# Patient Record
Sex: FEMALE | Race: WHITE | NOT HISPANIC OR LATINO | Employment: FULL TIME | URBAN - METROPOLITAN AREA
[De-identification: names, ages, dates, MRNs, and addresses within clinical notes are randomized per-mention and may not be internally consistent; named-entity substitution may affect disease eponyms.]

---

## 2017-03-23 RX ORDER — FLUOXETINE HYDROCHLORIDE 20 MG/1
CAPSULE ORAL
Qty: 90 CAPSULE | Refills: 0 | Status: SHIPPED | OUTPATIENT
Start: 2017-03-23 | End: 2017-08-28 | Stop reason: SDUPTHER

## 2017-03-23 RX ORDER — BUPROPION HYDROCHLORIDE 150 MG/1
TABLET ORAL
Qty: 90 TABLET | Refills: 0 | Status: SHIPPED | OUTPATIENT
Start: 2017-03-23 | End: 2017-08-28 | Stop reason: SDUPTHER

## 2017-07-20 ENCOUNTER — TELEPHONE (OUTPATIENT)
Dept: INTERNAL MEDICINE | Facility: CLINIC | Age: 52
End: 2017-07-20

## 2017-08-28 ENCOUNTER — LAB VISIT (OUTPATIENT)
Dept: LAB | Facility: HOSPITAL | Age: 52
End: 2017-08-28
Attending: FAMILY MEDICINE
Payer: COMMERCIAL

## 2017-08-28 ENCOUNTER — OFFICE VISIT (OUTPATIENT)
Dept: INTERNAL MEDICINE | Facility: CLINIC | Age: 52
End: 2017-08-28
Payer: COMMERCIAL

## 2017-08-28 ENCOUNTER — TELEPHONE (OUTPATIENT)
Dept: ORTHOPEDICS | Facility: CLINIC | Age: 52
End: 2017-08-28

## 2017-08-28 VITALS
BODY MASS INDEX: 33.6 KG/M2 | DIASTOLIC BLOOD PRESSURE: 82 MMHG | TEMPERATURE: 97 F | HEIGHT: 69 IN | WEIGHT: 226.88 LBS | SYSTOLIC BLOOD PRESSURE: 122 MMHG

## 2017-08-28 DIAGNOSIS — Z12.11 COLON CANCER SCREENING: ICD-10-CM

## 2017-08-28 DIAGNOSIS — Z00.00 ROUTINE GENERAL MEDICAL EXAMINATION AT A HEALTH CARE FACILITY: Primary | ICD-10-CM

## 2017-08-28 DIAGNOSIS — Z00.00 ROUTINE GENERAL MEDICAL EXAMINATION AT A HEALTH CARE FACILITY: ICD-10-CM

## 2017-08-28 LAB
25(OH)D3+25(OH)D2 SERPL-MCNC: 19 NG/ML
ALBUMIN SERPL BCP-MCNC: 3.6 G/DL
ALP SERPL-CCNC: 75 U/L
ALT SERPL W/O P-5'-P-CCNC: 20 U/L
ANION GAP SERPL CALC-SCNC: 7 MMOL/L
AST SERPL-CCNC: 21 U/L
BASOPHILS # BLD AUTO: 0.03 K/UL
BASOPHILS NFR BLD: 0.5 %
BILIRUB SERPL-MCNC: 0.4 MG/DL
BUN SERPL-MCNC: 19 MG/DL
CALCIUM SERPL-MCNC: 9.8 MG/DL
CHLORIDE SERPL-SCNC: 105 MMOL/L
CHOLEST/HDLC SERPL: 4.4 {RATIO}
CO2 SERPL-SCNC: 29 MMOL/L
CREAT SERPL-MCNC: 0.9 MG/DL
DIFFERENTIAL METHOD: NORMAL
EOSINOPHIL # BLD AUTO: 0.2 K/UL
EOSINOPHIL NFR BLD: 2.9 %
ERYTHROCYTE [DISTWIDTH] IN BLOOD BY AUTOMATED COUNT: 14.1 %
EST. GFR  (AFRICAN AMERICAN): >60 ML/MIN/1.73 M^2
EST. GFR  (NON AFRICAN AMERICAN): >60 ML/MIN/1.73 M^2
GLUCOSE SERPL-MCNC: 97 MG/DL
HCT VFR BLD AUTO: 42.3 %
HDL/CHOLESTEROL RATIO: 22.7 %
HDLC SERPL-MCNC: 198 MG/DL
HDLC SERPL-MCNC: 45 MG/DL
HGB BLD-MCNC: 13.8 G/DL
LDLC SERPL CALC-MCNC: 128.2 MG/DL
LYMPHOCYTES # BLD AUTO: 2 K/UL
LYMPHOCYTES NFR BLD: 33.4 %
MCH RBC QN AUTO: 28.5 PG
MCHC RBC AUTO-ENTMCNC: 32.6 G/DL
MCV RBC AUTO: 87 FL
MONOCYTES # BLD AUTO: 0.4 K/UL
MONOCYTES NFR BLD: 7.3 %
NEUTROPHILS # BLD AUTO: 3.3 K/UL
NEUTROPHILS NFR BLD: 55.6 %
NONHDLC SERPL-MCNC: 153 MG/DL
PLATELET # BLD AUTO: 277 K/UL
PMV BLD AUTO: 11.4 FL
POTASSIUM SERPL-SCNC: 4.7 MMOL/L
PROT SERPL-MCNC: 7.3 G/DL
RBC # BLD AUTO: 4.85 M/UL
SODIUM SERPL-SCNC: 141 MMOL/L
TRIGL SERPL-MCNC: 124 MG/DL
TSH SERPL DL<=0.005 MIU/L-ACNC: 1.8 UIU/ML
WBC # BLD AUTO: 5.9 K/UL

## 2017-08-28 PROCEDURE — 85025 COMPLETE CBC W/AUTO DIFF WBC: CPT

## 2017-08-28 PROCEDURE — 80061 LIPID PANEL: CPT

## 2017-08-28 PROCEDURE — 80053 COMPREHEN METABOLIC PANEL: CPT

## 2017-08-28 PROCEDURE — 99999 PR PBB SHADOW E&M-EST. PATIENT-LVL III: CPT | Mod: PBBFAC,,, | Performed by: FAMILY MEDICINE

## 2017-08-28 PROCEDURE — 99396 PREV VISIT EST AGE 40-64: CPT | Mod: S$GLB,,, | Performed by: FAMILY MEDICINE

## 2017-08-28 PROCEDURE — 82306 VITAMIN D 25 HYDROXY: CPT

## 2017-08-28 PROCEDURE — 84443 ASSAY THYROID STIM HORMONE: CPT

## 2017-08-28 PROCEDURE — 36415 COLL VENOUS BLD VENIPUNCTURE: CPT | Mod: PO

## 2017-08-28 PROCEDURE — 83036 HEMOGLOBIN GLYCOSYLATED A1C: CPT

## 2017-08-28 RX ORDER — FLUOXETINE HYDROCHLORIDE 20 MG/1
20 CAPSULE ORAL DAILY
Qty: 90 CAPSULE | Refills: 4 | Status: SHIPPED | OUTPATIENT
Start: 2017-08-28 | End: 2018-10-02 | Stop reason: SDUPTHER

## 2017-08-28 RX ORDER — BUPROPION HYDROCHLORIDE 150 MG/1
TABLET ORAL
Qty: 90 TABLET | Refills: 4 | Status: SHIPPED | OUTPATIENT
Start: 2017-08-28 | End: 2018-10-02 | Stop reason: SDUPTHER

## 2017-08-28 RX ORDER — OMEPRAZOLE 40 MG/1
40 CAPSULE, DELAYED RELEASE ORAL EVERY MORNING
Qty: 90 CAPSULE | Refills: 1 | Status: SHIPPED | OUTPATIENT
Start: 2017-08-28 | End: 2018-10-02 | Stop reason: SDUPTHER

## 2017-08-28 NOTE — TELEPHONE ENCOUNTER
----- Message from Angelito Jaimes MD sent at 8/27/2017 10:36 AM CDT -----  Yes, absolutely!  We will make you an appointment to be seen by me on Friday, September 8.  Someone will be in touch with you regarding an appointment time.  We will obtain x-rays of your affected hip and your lumbar spine at the time of your appointment.    Dr. Jaimes      ----- Message -----  From: Anita Street NP  Sent: 8/22/2017   3:33 PM  To: MD Garrett Ortiz Dr.,    Can you work me into your schedule on Friday, September 8th? I am scheduled to work on Friday September 1st.    Thanks,  Anita Street
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
## 2017-08-29 ENCOUNTER — PATIENT MESSAGE (OUTPATIENT)
Dept: INTERNAL MEDICINE | Facility: CLINIC | Age: 52
End: 2017-08-29

## 2017-08-29 LAB
ESTIMATED AVG GLUCOSE: 111 MG/DL
HBA1C MFR BLD HPLC: 5.5 %

## 2017-08-29 RX ORDER — ERGOCALCIFEROL 1.25 MG/1
50000 CAPSULE ORAL WEEKLY
Qty: 12 CAPSULE | Refills: 2 | Status: SHIPPED | OUTPATIENT
Start: 2017-08-29 | End: 2017-09-28

## 2017-08-31 ENCOUNTER — PATIENT MESSAGE (OUTPATIENT)
Dept: ORTHOPEDICS | Facility: CLINIC | Age: 52
End: 2017-08-31

## 2017-10-03 DIAGNOSIS — M25.552 PAIN OF BOTH HIP JOINTS: Primary | ICD-10-CM

## 2017-10-03 DIAGNOSIS — M25.551 PAIN OF BOTH HIP JOINTS: Primary | ICD-10-CM

## 2017-10-04 ENCOUNTER — HOSPITAL ENCOUNTER (OUTPATIENT)
Dept: RADIOLOGY | Facility: HOSPITAL | Age: 52
Discharge: HOME | End: 2017-10-04
Attending: ORTHOPAEDIC SURGERY
Payer: COMMERCIAL

## 2017-10-04 ENCOUNTER — OFFICE VISIT (OUTPATIENT)
Dept: ORTHOPEDICS | Facility: CLINIC | Age: 52
End: 2017-10-04
Payer: COMMERCIAL

## 2017-10-04 VITALS
RESPIRATION RATE: 18 BRPM | WEIGHT: 226 LBS | HEART RATE: 86 BPM | HEIGHT: 69 IN | BODY MASS INDEX: 33.47 KG/M2 | SYSTOLIC BLOOD PRESSURE: 134 MMHG | DIASTOLIC BLOOD PRESSURE: 84 MMHG

## 2017-10-04 DIAGNOSIS — M25.552 PAIN OF BOTH HIP JOINTS: ICD-10-CM

## 2017-10-04 DIAGNOSIS — M25.551 BILATERAL HIP PAIN: Primary | ICD-10-CM

## 2017-10-04 DIAGNOSIS — M25.551 PAIN OF BOTH HIP JOINTS: ICD-10-CM

## 2017-10-04 DIAGNOSIS — M70.62 TROCHANTERIC BURSITIS OF LEFT HIP: Primary | ICD-10-CM

## 2017-10-04 DIAGNOSIS — M25.552 BILATERAL HIP PAIN: Primary | ICD-10-CM

## 2017-10-04 DIAGNOSIS — M70.61 GREATER TROCHANTERIC BURSITIS OF RIGHT HIP: ICD-10-CM

## 2017-10-04 DIAGNOSIS — M16.0 ARTHRITIS OF BOTH HIPS: ICD-10-CM

## 2017-10-04 PROCEDURE — 99204 OFFICE O/P NEW MOD 45 MIN: CPT | Mod: 25,S$GLB,, | Performed by: ORTHOPAEDIC SURGERY

## 2017-10-04 PROCEDURE — 73521 X-RAY EXAM HIPS BI 2 VIEWS: CPT | Mod: 26,,, | Performed by: RADIOLOGY

## 2017-10-04 PROCEDURE — 99999 PR PBB SHADOW E&M-EST. PATIENT-LVL III: CPT | Mod: PBBFAC,,, | Performed by: ORTHOPAEDIC SURGERY

## 2017-10-04 PROCEDURE — 20610 DRAIN/INJ JOINT/BURSA W/O US: CPT | Mod: 50,S$GLB,, | Performed by: ORTHOPAEDIC SURGERY

## 2017-10-04 PROCEDURE — 73521 X-RAY EXAM HIPS BI 2 VIEWS: CPT | Mod: TC

## 2017-10-04 RX ORDER — METHYLPREDNISOLONE ACETATE 80 MG/ML
80 INJECTION, SUSPENSION INTRA-ARTICULAR; INTRALESIONAL; INTRAMUSCULAR; SOFT TISSUE ONCE
Status: DISCONTINUED | OUTPATIENT
Start: 2017-10-04 | End: 2017-11-13 | Stop reason: HOSPADM

## 2017-10-04 RX ORDER — METHYLPREDNISOLONE ACETATE 80 MG/ML
80 INJECTION, SUSPENSION INTRA-ARTICULAR; INTRALESIONAL; INTRAMUSCULAR; SOFT TISSUE
Status: DISCONTINUED | OUTPATIENT
Start: 2017-10-04 | End: 2017-10-04 | Stop reason: HOSPADM

## 2017-10-04 RX ORDER — SODIUM, POTASSIUM,MAG SULFATES 17.5-3.13G
SOLUTION, RECONSTITUTED, ORAL ORAL
Qty: 354 ML | Refills: 0 | Status: ON HOLD | OUTPATIENT
Start: 2017-10-04 | End: 2017-11-13 | Stop reason: ALTCHOICE

## 2017-10-04 RX ORDER — MELOXICAM 15 MG/1
15 TABLET ORAL DAILY
Qty: 30 TABLET | Refills: 2 | Status: SHIPPED | OUTPATIENT
Start: 2017-10-04 | End: 2018-10-17

## 2017-10-04 RX ADMIN — METHYLPREDNISOLONE ACETATE 80 MG: 80 INJECTION, SUSPENSION INTRA-ARTICULAR; INTRALESIONAL; INTRAMUSCULAR; SOFT TISSUE at 02:10

## 2017-10-04 NOTE — PROGRESS NOTES
Subjective:     Patient ID: Anita Street is a 51 y.o. female.    Chief Complaint: Pain of the Left Hip and Pain of the Right Hip    HPI: The patient is seen today for bilateral hip discomfort anteriorly and over the greater trochanters.  The right hip hurts worse than the left.  The patient has had a long intermittent history of hip discomfort however it has worsened over the last 6 months when she began a work out program.  Her program involves some squats etc.  Her hips also bother her after prolonged periods of standing.  There is not a positive family history for arthritis necessitating joint replacement surgery.    Family History   Problem Relation Age of Onset    Anxiety disorder Mother     Depression Mother     Diabetes Father     Hypertension Father     Heart disease Father     Prostate cancer Father     Cataracts Daughter     Hyperlipidemia Brother     Colon cancer Neg Hx     Breast cancer Neg Hx      Past Medical History:   Diagnosis Date    Anxiety     Depression     GERD (gastroesophageal reflux disease)      Social History     Social History    Marital status:      Spouse name: N/A    Number of children: 2    Years of education: N/A     Social History Main Topics    Smoking status: Never Smoker    Smokeless tobacco: Never Used    Alcohol use Yes      Comment: Rare Occ    Drug use: No    Sexual activity: Yes     Partners: Male     Other Topics Concern    None     Social History Narrative    None     Past Surgical History:   Procedure Laterality Date    CHOLECYSTECTOMY      HYSTERECTOMY      pelvic lap      Feritility     Review of patient's allergies indicates:  No Known Allergies      Medication List with Changes/Refills   New Medications    MELOXICAM (MOBIC) 15 MG TABLET    Take 1 tablet (15 mg total) by mouth once daily.   Current Medications    BUPROPION (WELLBUTRIN XL) 150 MG TB24 TABLET    TAKE 1 TABLET (150 MG TOTAL) BY MOUTH ONCE DAILY.    FLUOXETINE (PROZAC)  "20 MG CAPSULE    Take 1 capsule (20 mg total) by mouth once daily.    OMEPRAZOLE (PRILOSEC) 40 MG CAPSULE    Take 1 capsule (40 mg total) by mouth every morning.       Review of Systems   Constitution: Negative for chills, decreased appetite, weight gain and weight loss.   HENT: Negative for congestion, ear pain, hearing loss and sore throat.    Eyes: Negative for blurred vision, double vision, vision loss in left eye, vision loss in right eye and visual disturbance.   Cardiovascular: Negative for chest pain, irregular heartbeat, leg swelling and palpitations.   Respiratory: Negative for cough and shortness of breath.    Endocrine: Negative for cold intolerance and heat intolerance.   Hematologic/Lymphatic: Negative for adenopathy. Does not bruise/bleed easily.   Skin: Negative for color change, nail changes, rash and suspicious lesions.   Musculoskeletal: Positive for joint pain.   Gastrointestinal: Negative for abdominal pain, constipation, diarrhea, heartburn, nausea and vomiting.        Positive for acid reflux.   Genitourinary: Negative for bladder incontinence and dysuria.   Neurological: Negative for dizziness, paresthesias, sensory change and tremors.   Psychiatric/Behavioral: Negative for altered mental status, depression, memory loss and substance abuse.   Allergic/Immunologic: Negative for hives and persistent infections.       Objective:   /84   Pulse 86   Resp 18   Ht 5' 9" (1.753 m)   Wt 102.5 kg (226 lb)   BMI 33.37 kg/m²       General    Nursing note and vitals reviewed.  Constitutional: She is oriented to person, place, and time. She appears well-developed and well-nourished.   HENT:   Head: Normocephalic.   Nose: Nose normal.   Eyes: EOM are normal. Pupils are equal, round, and reactive to light.   Neck: Normal range of motion. Neck supple.   Cardiovascular: Normal rate and regular rhythm.    Pulmonary/Chest: Effort normal.   Abdominal: Soft. She exhibits no distension. There is no " tenderness.   Neurological: She is alert and oriented to person, place, and time.   Psychiatric: She has a normal mood and affect. Her behavior is normal.     General Musculoskeletal Exam   Gait: normal   Pelvic Obliquity: none    Right Ankle/Foot Exam   Right ankle exam is normal.    Range of Motion   The patient has normal right ankle ROM.    Alignment   Forefoot Alignment: normal    Muscle Strength   The patient has normal right ankle strength.    Tests   Anterior drawer: negative  Varus tilt: negative  Heel Walk: able to perform  Tiptoe Walk: able to perform    Other   Sensation: normal  Peroneal Subluxation: negative    Left Ankle/Foot Exam   Left ankle exam is normal.    Range of Motion   The patient has normal left ankle ROM.     Alignment   Forefoot Alignment: normal    Muscle Strength   The patient has normal left ankle strength.    Tests   Anterior drawer: negative  Varus tilt: negative  Heel Walk: able to perform  Tiptoe Walk: able to perform    Other   Sensation: normal  Peroneal Subluxation: negative    Right Knee Exam   Right knee exam is normal.    Inspection   Erythema: absent  Swelling: absent  Effusion: effusion  Deformity: deformity  Bruising: absent    Range of Motion   The patient has normal right knee ROM.    Tests   Meniscus   Joce:  Medial - negative Lateral - negative  Ligament Examination Lachman: normal (-1 to 2mm) PCL-Posterior Drawer: normal (0 to 2mm)     MCL - Valgus: normal (0 to 2mm)  LCL - Varus: normalPivot Shift: normal (Equal)  Posterolateral Corner: unstable (>15 degrees difference)  Patella   Patellar Apprehension: negative  Passive Patellar Tilt: neutral  Patellar Tracking: normal  Patellar Grind: negative    Other   Sensation: normal    Left Knee Exam   Left knee exam is normal.    Inspection   Erythema: absent  Swelling: absent  Effusion: absent  Deformity: deformity  Bruising: absent    Range of Motion   The patient has normal left knee ROM.    Tests   Meniscus    Joce:  Medial - negative Lateral - negative  Stability Lachman: normal (-1 to 2mm) PCL-Posterior Drawer: normal (0 to 2mm)  MCL - Valgus: normal (0 to 2mm)  LCL - Varus: normal (0 to 2mm)Pivot Shift: normal (Equal)  Posterolateral Corner: unstable (>15 degrees difference)  Patella   Patellar Apprehension: negative  Passive Patellar Tilt: neutral  Patellar Tracking: normal  Patellar Grind: negative    Other   Sensation: normal    Right Hip Exam     Inspection   Swelling: absent  Bruising: absent    Tenderness   The patient tender to palpation of the trochanteric bursa and rectus insertion.    Range of Motion   The patient has normal right hip ROM.    Muscle Strength   The patient has normal right hip strength.    Tests   Trendelenburg Test: negative    Other   Sensation: normal  Left Hip Exam     Inspection   Swelling: absent  Bruising: absent    Tenderness   The patient tender to palpation of the trochanteric bursa and rectus insertion.    Range of Motion   The patient has normal left hip ROM.    Muscle Strength   The patient has normal left hip strength.     Tests   Trendelenburg Test: negative    Other   Sensation: normal      Back (L-Spine & T-Spine) / Neck (C-Spine) Exam   Back exam is normal.    Neck (C-Spine) Range of Motion   Flexion:     Normal  Extension: Normal  Right Lateral Bend: normal  Left Lateral Bend: normal  Right Rotation: normal  Left Rotation: normal    Spinal Sensation   Right Side Sensation  C-Spine Level: normal   L-Spine Level: normal  S-Spine Level: normal  T-Spine Level: normal  Left Side Sensation  C-Spine Level: normal  L-Spine Level: normal  S-Spine Level: normal  T-Spine Level: normal    Other She has no scoliosis .  Head Tilt:  Negative      Right Hand/Wrist Exam   Right hand exam is normal.    Inspection   Deformity: Wrist - deformity     Range of Motion   The patient has normal right hand/wrist ROM.    Tests   Phalens Sign: negative  Tinels Sign (Medial Nerve):  negative  Finkelstein: negative  Cubital Tunnel Compression Test: negative      Other     Neuorologic Exam    Median Distribution: normal  Ulnar Distribution: normal  Radial Distribution: normal      Left Hand/Wrist Exam   Left hand exam is normal.    Inspection   Deformity: Wrist - absent     Range of Motion   The patient has normal left hand/wrist ROM.    Tests   Phalens Sign: negative  Tinels Sign (Medial Nerve): negative  Finkelstein: negative  Cubital Tunnel Compression Test: negative      Other     Sensory Exam  Median Distribution: normal  Ulnar Distribution: normal  Radial Distribution: normal      Right Elbow Exam   Right elbow exam is normal.    Inspection   Effusion: absent  Bruising: absent  Deformity: absent    Range of Motion   The patient has normal right elbow ROM.    Tests Tinel's Sign (cubital tunnel): negative    Other   Sensation: normal      Left Elbow Exam   Left elbow exam is normal.    Inspection   Effusion: absent  Bruising: absent  Deformity: absent    Range of Motion   The patient has normal left elbow ROM.    Tests Tinel's Sign (cubital tunnel): negative    Other   Sensation: normal    Right Shoulder Exam   Right shoulder exam is normal.    Range of Motion   Active Abduction: normal   Passive Abduction: normal   Extension: normal   Forward Flexion: normal   Forward Elevation: normal  Adduction: normal  External Rotation 0 degrees: normal   Internal Rotation 0 degrees: normal     Muscle Strength   The patient has normal right shoulder strength.    Tests & Signs   Apprehension: negative  Cross Arm: negative  Impingement: negative    Other   Sensation: normal    Left Shoulder Exam   Left shoulder exam is normal.    Range of Motion   Active Abduction: normal   Passive Abduction: normal   Extension: normal   Forward Flexion: normal   Forward Elevation: normal  Adduction: normal  External Rotation 0 degrees: normal   Internal Rotation 0 degrees: normal     Muscle Strength   The patient has  normal left shoulder strength.    Tests & Signs   Apprehension: negative  Cross Arm: negative  Impingement: negative    Other   Sensation: normal       Muscle Strength   Right Upper Extremity   Wrist Extension: /   Wrist Flexion: /   :    Elbow Pronation:     Elbow Supination:     Elbow Extension:   Elbow Flexion:   Intrinsics:   Left Upper Extremity  Wrist Extension:    Wrist Flexion:    :     Elbow Pronation:     Elbow Supination:     Elbow Extension:   Elbow Flexion:   Intrinsics: 5  Right Lower Extremity   Hip Abduction:    Quadriceps:     Hamstrin/5   Left Lower Extremity   Hip Abduction:    Quadriceps:     Hamstrin/5     Reflexes     Left Side  Biceps:  2+  Triceps:  2+  Quadriceps:  2+  Achilles:  2+    Right Side   Biceps:  2+  Triceps:  2+  Quadriceps:  2+  Achilles:  2+    Vascular Exam     Right Pulses  Dorsalis Pedis:      2+  Posterior Tibial:      2+  Radial:                    2+      Left Pulses  Dorsalis Pedis:      2+  Posterior Tibial:      2+  Radial:                    2+      Capillary Refill  Right Hand: normal capillary refill  Left Hand: normal capillary refill        X-RAY:   X-rays of the hips show some early subchondral sclerosis of the acetabulum bilaterally.  Joint space is relatively well maintained.  There is no acute fracture or bony destructive lesion noted.    Assessment:         Encounter Diagnoses   Name Primary?    Trochanteric bursitis of left hip Yes    Greater trochanteric bursitis of right hip     Arthritis of both hips       Possible femoral acetabular impingement syndrome bilaterally.  The patient is more symptomatically the right than the left.    Plan:     The patient is a candidate for steroid and Xylocaine injections to the greater trochanter bursa area bilaterally.  She was started on meloxicam 15 mg daily with food.  The patient will be seen back in 6-8 weeksas necessary if she  continues to be symptomatic.  She understands that sometimes one injection and medication does the trick, however sometimes, a series of 3 or maximum of 4 injections is required.  Should the patient remain symptomatic at an unacceptable level of discomfort and function, she will require further evaluation with an MRI scan.  Should she go on to have significant symptoms consistent with femoral acetabular impingement, she may require referral for hip arthroscopic evaluation, as I do not do hip arthroscopy.             Disclaimer: This note is generated with speech recognition software and is subject to transcription error and sound alike phrases that may be missed by proofreading.

## 2017-10-04 NOTE — PROCEDURES
Large Joint Aspiration/Injection  Date/Time: 10/4/2017 2:02 PM  Performed by: EITAN THAPA  Authorized by: EITAN THAPA     Consent Done?:  Yes (Verbal)  Indications:  Pain  Procedure site marked: Yes    Timeout: Prior to procedure the correct patient, procedure, and site was verified      Location:  Hip  Site:  R greater trochanteric bursa and L greater trochanteric bursa  Prep: Patient was prepped and draped in usual sterile fashion    Ultrasonic Guidance for needle placement: No  Needle size:  22 G  Approach:  Lateral  Medications:  80 mg methylPREDNISolone acetate 80 mg/mL; 80 mg methylPREDNISolone acetate 80 mg/mL  Patient tolerance:  Patient tolerated the procedure well with no immediate complications

## 2017-11-13 ENCOUNTER — SURGERY (OUTPATIENT)
Age: 52
End: 2017-11-13

## 2017-11-13 ENCOUNTER — HOSPITAL ENCOUNTER (OUTPATIENT)
Facility: HOSPITAL | Age: 52
Discharge: HOME | End: 2017-11-13
Attending: INTERNAL MEDICINE | Admitting: INTERNAL MEDICINE
Payer: COMMERCIAL

## 2017-11-13 ENCOUNTER — ANESTHESIA (OUTPATIENT)
Dept: ENDOSCOPY | Facility: HOSPITAL | Age: 52
End: 2017-11-13
Payer: COMMERCIAL

## 2017-11-13 ENCOUNTER — ANESTHESIA EVENT (OUTPATIENT)
Dept: ENDOSCOPY | Facility: HOSPITAL | Age: 52
End: 2017-11-13
Payer: COMMERCIAL

## 2017-11-13 VITALS
RESPIRATION RATE: 18 BRPM | WEIGHT: 226 LBS | BODY MASS INDEX: 33.47 KG/M2 | HEIGHT: 69 IN | HEART RATE: 69 BPM | DIASTOLIC BLOOD PRESSURE: 72 MMHG | SYSTOLIC BLOOD PRESSURE: 125 MMHG | TEMPERATURE: 98 F | OXYGEN SATURATION: 100 %

## 2017-11-13 VITALS — RESPIRATION RATE: 21 BRPM

## 2017-11-13 DIAGNOSIS — Z12.11 COLON CANCER SCREENING: Primary | ICD-10-CM

## 2017-11-13 PROCEDURE — 45380 COLONOSCOPY AND BIOPSY: CPT | Performed by: INTERNAL MEDICINE

## 2017-11-13 PROCEDURE — 37000009 HC ANESTHESIA EA ADD 15 MINS: Performed by: INTERNAL MEDICINE

## 2017-11-13 PROCEDURE — 88305 TISSUE EXAM BY PATHOLOGIST: CPT | Performed by: PATHOLOGY

## 2017-11-13 PROCEDURE — 25000003 PHARM REV CODE 250: Performed by: INTERNAL MEDICINE

## 2017-11-13 PROCEDURE — 37000008 HC ANESTHESIA 1ST 15 MINUTES: Performed by: INTERNAL MEDICINE

## 2017-11-13 PROCEDURE — 88305 TISSUE EXAM BY PATHOLOGIST: CPT | Mod: 26,,, | Performed by: PATHOLOGY

## 2017-11-13 PROCEDURE — 45380 COLONOSCOPY AND BIOPSY: CPT | Mod: 33,,, | Performed by: INTERNAL MEDICINE

## 2017-11-13 PROCEDURE — 27201012 HC FORCEPS, HOT/COLD, DISP: Performed by: INTERNAL MEDICINE

## 2017-11-13 PROCEDURE — 25000003 PHARM REV CODE 250: Performed by: NURSE ANESTHETIST, CERTIFIED REGISTERED

## 2017-11-13 PROCEDURE — 63600175 PHARM REV CODE 636 W HCPCS: Performed by: NURSE ANESTHETIST, CERTIFIED REGISTERED

## 2017-11-13 RX ORDER — LIDOCAINE HYDROCHLORIDE 10 MG/ML
INJECTION INFILTRATION; PERINEURAL
Status: DISCONTINUED | OUTPATIENT
Start: 2017-11-13 | End: 2017-11-13

## 2017-11-13 RX ORDER — PROPOFOL 10 MG/ML
VIAL (ML) INTRAVENOUS
Status: DISCONTINUED | OUTPATIENT
Start: 2017-11-13 | End: 2017-11-13

## 2017-11-13 RX ORDER — SODIUM CHLORIDE, SODIUM LACTATE, POTASSIUM CHLORIDE, CALCIUM CHLORIDE 600; 310; 30; 20 MG/100ML; MG/100ML; MG/100ML; MG/100ML
INJECTION, SOLUTION INTRAVENOUS CONTINUOUS
Status: DISCONTINUED | OUTPATIENT
Start: 2017-11-14 | End: 2017-11-13 | Stop reason: HOSPADM

## 2017-11-13 RX ADMIN — LIDOCAINE HYDROCHLORIDE 50 MG: 10 INJECTION, SOLUTION INFILTRATION; PERINEURAL at 07:11

## 2017-11-13 RX ADMIN — PROPOFOL 50 MG: 10 INJECTION, EMULSION INTRAVENOUS at 08:11

## 2017-11-13 RX ADMIN — PROPOFOL 50 MG: 10 INJECTION, EMULSION INTRAVENOUS at 07:11

## 2017-11-13 RX ADMIN — SODIUM CHLORIDE, SODIUM LACTATE, POTASSIUM CHLORIDE, AND CALCIUM CHLORIDE: 600; 310; 30; 20 INJECTION, SOLUTION INTRAVENOUS at 07:11

## 2017-11-13 NOTE — TRANSFER OF CARE
"Anesthesia Transfer of Care Note    Patient: Anita Street    Procedure(s) Performed: Procedure(s) (LRB):  COLONOSCOPY (N/A)    Patient location: GI    Anesthesia Type: MAC    Transport from OR: Transported from OR on room air with adequate spontaneous ventilation    Post pain: adequate analgesia    Post assessment: no apparent anesthetic complications    Post vital signs: stable    Level of consciousness: awake, alert and oriented    Nausea/Vomiting: no nausea/vomiting    Complications: none    Transfer of care protocol was followed      Last vitals:   Visit Vitals  BP (!) 141/74 (BP Location: Left arm, Patient Position: Lying)   Pulse 82   Temp 36.7 °C (98.1 °F) (Oral)   Resp 18   Ht 5' 9" (1.753 m)   Wt 102.5 kg (226 lb)   SpO2 99%   Breastfeeding? No   BMI 33.37 kg/m²     "

## 2017-11-13 NOTE — H&P
Short Stay Endoscopy History and Physical    PCP - Dayton Malave MD    Procedure - Colonoscopy    No history of colon polyps, no FH of colon cancer. Needs colon cancer screening. Denies any acute issues.    ROS:  Constitutional: No fevers, chills, No weight loss  ENT: No allergies  CV: No chest pain  Pulm: No cough, No shortness of breath  Ophtho: No vision changes  GI: see HPI  Derm: No rash  Heme: No lymphadenopathy, No bruising  MSK: No arthritis  : No dysuria, No hematuria  Endo: No hot or cold intolerance  Neuro: No syncope, No seizure  Psych: No anxiety, No depression    Medical History:  has a past medical history of Anxiety; Depression; and GERD (gastroesophageal reflux disease).    Surgical History:  has a past surgical history that includes Hysterectomy; Cholecystectomy; and pelvic lap.    Family History: family history includes Anxiety disorder in her mother; Cataracts in her daughter; Depression in her mother; Diabetes in her father; Heart disease in her father; Hyperlipidemia in her brother; Hypertension in her father; Prostate cancer in her father.. Otherwise no colon cancer, inflammatory bowel disease, or GI malignancies.    Social History:  reports that she has never smoked. She has never used smokeless tobacco. She reports that she drinks alcohol. She reports that she does not use drugs.    Review of patient's allergies indicates:  No Known Allergies    Medications:   Facility-Administered Medications Prior to Admission   Medication Dose Route Frequency Provider Last Rate Last Dose    methylPREDNISolone acetate injection 80 mg  80 mg Intramuscular Once Angelito Jaimes MD        methylPREDNISolone acetate injection 80 mg  80 mg Intramuscular Once Angelito Jaimes MD         Prescriptions Prior to Admission   Medication Sig Dispense Refill Last Dose    buPROPion (WELLBUTRIN XL) 150 MG TB24 tablet TAKE 1 TABLET (150 MG TOTAL) BY MOUTH ONCE DAILY. 90 tablet 4 11/12/2017 at Unknown time     fluoxetine (PROZAC) 20 MG capsule Take 1 capsule (20 mg total) by mouth once daily. 90 capsule 4 11/12/2017 at Unknown time    meloxicam (MOBIC) 15 MG tablet Take 1 tablet (15 mg total) by mouth once daily. 30 tablet 2 11/12/2017 at Unknown time    omeprazole (PRILOSEC) 40 MG capsule Take 1 capsule (40 mg total) by mouth every morning. 90 capsule 1 Past Month at Unknown time       Objective Findings:    Vital Signs:   Vitals:    11/13/17 0725   BP: (!) 141/74   Pulse: 82   Resp: 18   Temp: 98.1 °F (36.7 °C)         Physical Exam:  General Appearance: Well appearing in no acute distress  Eyes:    No scleral icterus  ENT: Neck supple, Lips, mucosa, and tongue normal; teeth and gums normal  Lungs: CTA bilaterally in anterior and posterior fields, no wheezes, no crackles.  Heart:  Regular rate, S1, S2 normal, no murmurs heard.  Abdomen: Soft, non tender, non distended with normal bowel sounds. No hepatosplenomegaly, ascites, or mass.  Extremities: No clubbing, cyanosis or edema  Skin: No rash    Labs:  Lab Results   Component Value Date    WBC 5.90 08/28/2017    HGB 13.8 08/28/2017    HCT 42.3 08/28/2017     08/28/2017    CHOL 198 08/28/2017    TRIG 124 08/28/2017    HDL 45 08/28/2017    ALT 20 08/28/2017    AST 21 08/28/2017     08/28/2017    K 4.7 08/28/2017     08/28/2017    CREATININE 0.9 08/28/2017    BUN 19 08/28/2017    CO2 29 08/28/2017    TSH 1.798 08/28/2017    HGBA1C 5.5 08/28/2017       I have explained the risks and benefits of endoscopy procedures to the patient including but not limited to bleeding, perforation, infection, and death.    Proceed with colonoscopy for average risk colon cancer screening.

## 2017-11-13 NOTE — ANESTHESIA RELEASE NOTE
"Anesthesia Release from PACU Note    Patient: Anita Street    Procedure(s) Performed: Procedure(s) (LRB):  COLONOSCOPY (N/A)    Anesthesia type: MAC    Post pain: Adequate analgesia    Post assessment: no apparent anesthetic complications, tolerated procedure well and no evidence of recall    Last Vitals:   Visit Vitals  /72 (BP Location: Left arm, Patient Position: Lying)   Pulse 69   Temp 36.7 °C (98.1 °F) (Oral)   Resp 18   Ht 5' 9" (1.753 m)   Wt 102.5 kg (226 lb)   SpO2 100%   Breastfeeding? No   BMI 33.37 kg/m²       Post vital signs: stable    Level of consciousness: awake, alert  and oriented    Nausea/Vomiting: no nausea/no vomiting    Complications: none    Airway Patency: patent    Respiratory: unassisted, spontaneous ventilation, room air    Cardiovascular: stable and blood pressure at baseline    Hydration: euvolemic  "

## 2017-11-13 NOTE — PLAN OF CARE
AT BEDSIDE. DR BOCANEGRA SPOKE TO PT AND  . DISCHARGE INSTRUCTIONS GIVEN TO PT AND FAMILY. VERBALIZE UNDERSTANDING

## 2017-11-13 NOTE — ANESTHESIA POSTPROCEDURE EVALUATION
"Anesthesia Post Evaluation    Patient: Anita Street    Procedure(s) Performed: Procedure(s) (LRB):  COLONOSCOPY (N/A)    Final Anesthesia Type: MAC  Patient location during evaluation: GI PACU  Patient participation: Yes- Able to Participate  Level of consciousness: awake and alert and oriented  Post-procedure vital signs: reviewed and stable  Pain management: adequate  Airway patency: patent  PONV status at discharge: No PONV  Anesthetic complications: no      Cardiovascular status: blood pressure returned to baseline  Respiratory status: unassisted, room air and spontaneous ventilation  Hydration status: euvolemic  Follow-up not needed.        Visit Vitals  /72 (BP Location: Left arm, Patient Position: Lying)   Pulse 69   Temp 36.7 °C (98.1 °F) (Oral)   Resp 18   Ht 5' 9" (1.753 m)   Wt 102.5 kg (226 lb)   SpO2 100%   Breastfeeding? No   BMI 33.37 kg/m²       Pain/Aditya Score: Pain Assessment Performed: Yes (11/13/2017  7:28 AM)  Presence of Pain: denies (11/13/2017  8:50 AM)  Aditya Score: 10 (11/13/2017  8:50 AM)      "

## 2017-11-13 NOTE — ANESTHESIA PREPROCEDURE EVALUATION
11/13/2017  Anita Street is a 51 y.o., female.    Anesthesia Evaluation    I have reviewed the Patient Summary Reports.    I have reviewed the Nursing Notes.   I have reviewed the Medications.     Review of Systems  Anesthesia Hx:  No problems with previous Anesthesia    Social:  No Alcohol Use    Hepatic/GI:   Bowel Prep. GERD  Esophageal / Stomach Disorders Gerd    Endocrine:  Metabolic Disorders, Obesity / BMI > 30  Psych:   Psychiatric History anxiety depression          Physical Exam  General:  Obesity    Airway/Jaw/Neck:  Airway Findings: Mouth Opening: Normal Tongue: Normal  General Airway Assessment: Adult       Chest/Lungs:  Chest/Lungs Findings: Normal Respiratory Rate     Heart/Vascular:  Heart Findings: Rate: Normal             Anesthesia Plan  Type of Anesthesia, risks & benefits discussed:  Anesthesia Type:  MAC  Patient's Preference:   Intra-op Monitoring Plan: standard ASA monitors  Intra-op Monitoring Plan Comments:   Post Op Pain Control Plan:   Post Op Pain Control Plan Comments:   Induction:   IV  Beta Blocker:  Patient is not currently on a Beta-Blocker (No further documentation required).       Informed Consent: Patient understands risks and agrees with Anesthesia plan.  Questions answered. Anesthesia consent signed with patient.  ASA Score: 2     Day of Surgery Review of History & Physical: I have interviewed and examined the patient. I have reviewed the patient's H&P dated:  There are no significant changes.          Ready For Surgery From Anesthesia Perspective.

## 2017-11-13 NOTE — DISCHARGE INSTRUCTIONS

## 2018-01-19 ENCOUNTER — OFFICE VISIT (OUTPATIENT)
Dept: OTOLARYNGOLOGY | Facility: CLINIC | Age: 53
End: 2018-01-19
Payer: COMMERCIAL

## 2018-01-19 VITALS
TEMPERATURE: 98 F | BODY MASS INDEX: 34.48 KG/M2 | DIASTOLIC BLOOD PRESSURE: 85 MMHG | WEIGHT: 232.81 LBS | RESPIRATION RATE: 18 BRPM | HEART RATE: 82 BPM | HEIGHT: 69 IN | SYSTOLIC BLOOD PRESSURE: 133 MMHG

## 2018-01-19 DIAGNOSIS — D10.1 FIBROMA OF TONGUE: Primary | ICD-10-CM

## 2018-01-19 PROCEDURE — 99999 PR PBB SHADOW E&M-EST. PATIENT-LVL III: CPT | Mod: PBBFAC,,, | Performed by: OTOLARYNGOLOGY

## 2018-01-19 PROCEDURE — 3008F BODY MASS INDEX DOCD: CPT | Mod: S$GLB,,, | Performed by: OTOLARYNGOLOGY

## 2018-01-19 PROCEDURE — 99203 OFFICE O/P NEW LOW 30 MIN: CPT | Mod: S$GLB,,, | Performed by: OTOLARYNGOLOGY

## 2018-02-21 NOTE — PROGRESS NOTES
"Referring Provider:    Eliasrefliseal Self  No address on file  Subjective:   Patient: Anita Street [id number], :1965   Visit date:2018 9:58 PM    Chief Complaint:  Other (tongue lesion- patient states she has had this lesion for a year)    HPI:  Anita is a 52 y.o. female who I was asked to see in consultation for evaluation of the following issue(s):     Small mass on tongue present for about 1 year. No pain.  No recollection of trauma.  No change in size.  No history of tobacco exposure.     Review of Systems:  -     Allergic/Immunologic: has No Known Allergies..  -     Constitutional: Current temp: 98 °F (36.7 °C) (Tympanic)      Her meds, allergies, medical, surgical, social & family histories were reviewed & updated:  -     She has a current medication list which includes the following prescription(s): bupropion, fluoxetine, meloxicam, and omeprazole.  -     She  has a past medical history of Anxiety; Depression; and GERD (gastroesophageal reflux disease).   -     She  does not have any pertinent problems on file.   -     She  has a past surgical history that includes Hysterectomy; Cholecystectomy; pelvic lap; and Colonoscopy (N/A, 2017).  -     She  reports that she has never smoked. She has never used smokeless tobacco. She reports that she drinks alcohol. She reports that she does not use drugs.  -     Her family history includes Anxiety disorder in her mother; Cataracts in her daughter; Depression in her mother; Diabetes in her father; Heart disease in her father; Hyperlipidemia in her brother; Hypertension in her father; Prostate cancer in her father.  -     She has No Known Allergies.    Objective:     Physical Exam:  Vitals:  /85   Pulse 82   Temp 98 °F (36.7 °C) (Tympanic)   Resp 18   Ht 5' 9" (1.753 m)   Wt 105.6 kg (232 lb 12.9 oz)   BMI 34.38 kg/m²   General appearance:  Well developed, well nourished    Eyes:  Extraocular motions intact, PERRL    Communication:  no " hoarseness, no dysphonia    Ears:  Otoscopy of external auditory canals and tympanic membranes was normal, clinical speech reception thresholds grossly intact, no mass/lesion of auricle.  Nose:  No masses/lesions of external nose, nasal mucosa, septum, and turbinates were within normal limits.  Mouth:  No mass/lesion of lips, teeth, gums, hard/soft palate, tonsils, or oropharynx. Small round mass on anterolateral tongue with normal overlying mucosa and pedunculated base    Cardiovascular:  No pedal edema; Radial Pulses +2     Neck & Lymphatics:  No cervical lymphadenopathy, no neck mass/crepitus/ asymmetry, trachea is midline, no thyroid enlargement/tenderness/mass.    Psych: Oriented x3,  Alert with normal mood and affect.     Respiration/Chest:  Symmetric expansion during respiration, normal respiratory effort.    Skin:  Warm and intact. No ulcerations of face, scalp, neck.      Assessment & Plan:   Fibroma of tongue    Overall appears to be an asymptomatic fibroma of the tongue.  We discussed her medical conditions, treatments and plan.   Furthermore, I counseled Anita that while at this point I do not see any evidence of head and neck cancer, this does not eliminate the possibility of a very small malignancy that is currently undetectable on examination or developing head and neck cancer in the future.  I advised them of the signs of head and neck cancer and that they should alert my office should they occur for more than 10-14 days (unintentional weight loss, pain or difficulty with swallowing, bleeding in the oral cavity or throat, coughing or spitting up blood, pain with mouth opening, persistent ulceration of the skin or mucosa, persistent or enlarging neck masses/swelling).  Anita seemed to have a clear understanding of the concerning nature of these conditions.  Anita should return to clinic if any issues arise (symptoms worsen or persist), otherwise we will see her back in the clinic only as  needed.         We discussed her medical conditions, treatments and plan.  Anita should return to clinic if any issues arise (symptoms worsen or persist), otherwise we will see her back in the clinic only as needed.      Thank you for allowing me to participate in the care of Anita.    Deni Edouard MD

## 2018-10-02 RX ORDER — BUPROPION HYDROCHLORIDE 150 MG/1
TABLET ORAL
Qty: 90 TABLET | Refills: 4 | Status: SHIPPED | OUTPATIENT
Start: 2018-10-02 | End: 2020-01-06

## 2018-10-02 RX ORDER — FLUOXETINE HYDROCHLORIDE 20 MG/1
20 CAPSULE ORAL DAILY
Qty: 90 CAPSULE | Refills: 4 | Status: SHIPPED | OUTPATIENT
Start: 2018-10-02 | End: 2020-01-06

## 2018-10-02 RX ORDER — OMEPRAZOLE 40 MG/1
40 CAPSULE, DELAYED RELEASE ORAL EVERY MORNING
Qty: 90 CAPSULE | Refills: 1 | Status: SHIPPED | OUTPATIENT
Start: 2018-10-02 | End: 2020-09-09

## 2018-10-17 ENCOUNTER — OFFICE VISIT (OUTPATIENT)
Dept: OTOLARYNGOLOGY | Facility: CLINIC | Age: 53
End: 2018-10-17
Payer: COMMERCIAL

## 2018-10-17 VITALS
BODY MASS INDEX: 31.25 KG/M2 | HEART RATE: 73 BPM | DIASTOLIC BLOOD PRESSURE: 89 MMHG | WEIGHT: 211.63 LBS | SYSTOLIC BLOOD PRESSURE: 140 MMHG

## 2018-10-17 DIAGNOSIS — K14.8 TONGUE MASS: Primary | ICD-10-CM

## 2018-10-17 PROCEDURE — 99999 PR PBB SHADOW E&M-EST. PATIENT-LVL III: CPT | Mod: PBBFAC,,, | Performed by: OTOLARYNGOLOGY

## 2018-10-17 PROCEDURE — 88305 TISSUE EXAM BY PATHOLOGIST: CPT | Mod: 26,,, | Performed by: PATHOLOGY

## 2018-10-17 PROCEDURE — 88305 TISSUE EXAM BY PATHOLOGIST: CPT | Performed by: PATHOLOGY

## 2018-10-17 PROCEDURE — 3008F BODY MASS INDEX DOCD: CPT | Mod: CPTII,S$GLB,, | Performed by: OTOLARYNGOLOGY

## 2018-10-17 PROCEDURE — 99213 OFFICE O/P EST LOW 20 MIN: CPT | Mod: 25,S$GLB,, | Performed by: OTOLARYNGOLOGY

## 2018-10-17 PROCEDURE — 40812 EXCISE/REPAIR MOUTH LESION: CPT | Mod: S$GLB,,, | Performed by: OTOLARYNGOLOGY

## 2018-10-17 RX ORDER — AMOXICILLIN AND CLAVULANATE POTASSIUM 875; 125 MG/1; MG/1
1 TABLET, FILM COATED ORAL 2 TIMES DAILY
Qty: 28 TABLET | Refills: 0 | Status: SHIPPED | OUTPATIENT
Start: 2018-10-17 | End: 2018-10-24

## 2018-10-18 NOTE — PROGRESS NOTES
Subjective:   Patient: Anita Street [id number], :1965   Visit date:10/17/2018 4:08 PM    Chief Complaint:  Other (removal of tongue lesion)    HPI:  Anita is a 52 y.o. female who is here for follow-up. She was last here several months ago with a mildly tender raised area on the anterior tongue.  This has become more bothersome to her.  She frequently bites on this accidentally.  It has become somewhat more uncomfortable.         Review of Systems:  -     Allergic/Immunologic: has No Known Allergies..  -     Constitutional: Current temp:      Her meds, allergies, medical, surgical, social & family histories were reviewed & updated:  -     She has a current medication list which includes the following prescription(s): bupropion, fluoxetine, omeprazole, and amoxicillin-clavulanate 875-125mg.  -     She  has a past medical history of Anxiety, Depression, and GERD (gastroesophageal reflux disease).   -     She does not have any pertinent problems on file.   -     She  has a past surgical history that includes Hysterectomy; Cholecystectomy; pelvic lap; and COLONOSCOPY (N/A, 2017).  -     She  reports that  has never smoked. she has never used smokeless tobacco. She reports that she drinks alcohol. She reports that she does not use drugs.  -     Her family history includes Anxiety disorder in her mother; Cataracts in her daughter; Depression in her mother; Diabetes in her father; Heart disease in her father; Hyperlipidemia in her brother; Hypertension in her father; Prostate cancer in her father.  -     She has No Known Allergies.    Objective:     Physical Exam:  Vitals:  BP (!) 140/89   Pulse 73   Wt 96 kg (211 lb 10.3 oz)   BMI 31.25 kg/m²   Communication:  Able to communicate, no hoarseness.  Head & Face:  Normocephalic, atraumatic, no sinus tenderness.  Eyes:  Extraocular motions intact.  Ears:  Otoscopy of external auditory canals and tympanic membranes was normal, clinical speech reception  thresholds grossly intact, no mass/lesion of auricle.  Nose:  No masses/lesions of external nose, nasal mucosa, septum, and turbinates were within normal limits.  Mouth:  No mass/lesion of lips, teeth, gums, hard/soft palate, tongue, tonsils, or oropharynx.  Neck & Lymphatics:  No cervical lymphadenopathy, no neck mass/crepitus/ asymmetry, trachea is midline, no thyroid enlargement/tenderness/mass.  Neuro/Psych: Alert with normal mood and affect.   Respiration/Chest:  Symmetric expansion during respiration, normal respiratory effort.  Skin:  Warm and intact.    Assessment & Plan:   Anita was seen today for other.    Diagnoses and all orders for this visit:    Tongue mass  -     Tissue Specimen To Pathology, Surgery    Other orders  -     amoxicillin-clavulanate 875-125mg (AUGMENTIN) 875-125 mg per tablet; Take 1 tablet by mouth 2 (two) times daily. for 7 days      Excision performed.    Will call with path.      Patient: Anita Street [id number], :1965  Procedure date:10/17/2018  Patient's medications, allergies, past medical, surgical, social and family histories were reviewed and updated as appropriate.  Chief Complaint:  Other (removal of tongue lesion)    HPI:  Anita is a 52 y.o. female with a mass located on the anterior tongue left of midline    Procedure: Risks, benefits, and alternatives of the procedure were discussed with the patient, and the patient consented to the excision of the left tongue mass and reconstruction of the defect.  The area was visualized with proper lighting and exposure.  Adequate anesthesia was obtained using 2cc of 1% Lidocaine with 1:100,000 Epinephrine.  An incision was made and the mass was dissected circumferentially until only the medial attachment remained.  The mass entirety of the mass was removed by transecting the base.       The wound did require suture closure- 4-0 Chromic.      The patient tolerated the procedure well with no complications.        Assessment & Plan:  - see today's clinic note

## 2018-10-26 ENCOUNTER — PATIENT MESSAGE (OUTPATIENT)
Dept: OTOLARYNGOLOGY | Facility: CLINIC | Age: 53
End: 2018-10-26

## 2018-10-31 ENCOUNTER — LAB VISIT (OUTPATIENT)
Dept: LAB | Facility: HOSPITAL | Age: 53
End: 2018-10-31
Attending: FAMILY MEDICINE
Payer: COMMERCIAL

## 2018-10-31 ENCOUNTER — OFFICE VISIT (OUTPATIENT)
Dept: INTERNAL MEDICINE | Facility: CLINIC | Age: 53
End: 2018-10-31
Payer: COMMERCIAL

## 2018-10-31 VITALS
TEMPERATURE: 97 F | HEIGHT: 69 IN | SYSTOLIC BLOOD PRESSURE: 118 MMHG | HEART RATE: 66 BPM | WEIGHT: 214.5 LBS | DIASTOLIC BLOOD PRESSURE: 82 MMHG | BODY MASS INDEX: 31.77 KG/M2

## 2018-10-31 DIAGNOSIS — Z00.00 ROUTINE GENERAL MEDICAL EXAMINATION AT A HEALTH CARE FACILITY: ICD-10-CM

## 2018-10-31 DIAGNOSIS — Z00.00 ROUTINE GENERAL MEDICAL EXAMINATION AT A HEALTH CARE FACILITY: Primary | ICD-10-CM

## 2018-10-31 LAB
25(OH)D3+25(OH)D2 SERPL-MCNC: 25 NG/ML
ALBUMIN SERPL BCP-MCNC: 3.6 G/DL
ALP SERPL-CCNC: 65 U/L
ALT SERPL W/O P-5'-P-CCNC: 13 U/L
ANION GAP SERPL CALC-SCNC: 6 MMOL/L
AST SERPL-CCNC: 15 U/L
BASOPHILS # BLD AUTO: 0.06 K/UL
BASOPHILS NFR BLD: 0.9 %
BILIRUB SERPL-MCNC: 0.5 MG/DL
BUN SERPL-MCNC: 18 MG/DL
CALCIUM SERPL-MCNC: 9.7 MG/DL
CHLORIDE SERPL-SCNC: 105 MMOL/L
CHOLEST SERPL-MCNC: 168 MG/DL
CHOLEST/HDLC SERPL: 3.7 {RATIO}
CO2 SERPL-SCNC: 27 MMOL/L
CREAT SERPL-MCNC: 0.8 MG/DL
DIFFERENTIAL METHOD: ABNORMAL
EOSINOPHIL # BLD AUTO: 0.2 K/UL
EOSINOPHIL NFR BLD: 2.8 %
ERYTHROCYTE [DISTWIDTH] IN BLOOD BY AUTOMATED COUNT: 13.5 %
EST. GFR  (AFRICAN AMERICAN): >60 ML/MIN/1.73 M^2
EST. GFR  (NON AFRICAN AMERICAN): >60 ML/MIN/1.73 M^2
ESTIMATED AVG GLUCOSE: 111 MG/DL
GLUCOSE SERPL-MCNC: 84 MG/DL
HBA1C MFR BLD HPLC: 5.5 %
HCT VFR BLD AUTO: 42.4 %
HDLC SERPL-MCNC: 46 MG/DL
HDLC SERPL: 27.4 %
HGB BLD-MCNC: 13.3 G/DL
IMM GRANULOCYTES # BLD AUTO: 0.02 K/UL
IMM GRANULOCYTES NFR BLD AUTO: 0.3 %
LDLC SERPL CALC-MCNC: 100.6 MG/DL
LYMPHOCYTES # BLD AUTO: 2.1 K/UL
LYMPHOCYTES NFR BLD: 30.7 %
MCH RBC QN AUTO: 28.9 PG
MCHC RBC AUTO-ENTMCNC: 31.4 G/DL
MCV RBC AUTO: 92 FL
MONOCYTES # BLD AUTO: 0.5 K/UL
MONOCYTES NFR BLD: 7.8 %
NEUTROPHILS # BLD AUTO: 3.8 K/UL
NEUTROPHILS NFR BLD: 57.5 %
NONHDLC SERPL-MCNC: 122 MG/DL
NRBC BLD-RTO: 0 /100 WBC
PLATELET # BLD AUTO: 252 K/UL
PMV BLD AUTO: 11.9 FL
POTASSIUM SERPL-SCNC: 4.5 MMOL/L
PROT SERPL-MCNC: 6.7 G/DL
RBC # BLD AUTO: 4.61 M/UL
SODIUM SERPL-SCNC: 138 MMOL/L
TRIGL SERPL-MCNC: 107 MG/DL
TSH SERPL DL<=0.005 MIU/L-ACNC: 1.18 UIU/ML
WBC # BLD AUTO: 6.68 K/UL

## 2018-10-31 PROCEDURE — 83036 HEMOGLOBIN GLYCOSYLATED A1C: CPT

## 2018-10-31 PROCEDURE — 82306 VITAMIN D 25 HYDROXY: CPT

## 2018-10-31 PROCEDURE — 85025 COMPLETE CBC W/AUTO DIFF WBC: CPT

## 2018-10-31 PROCEDURE — 80061 LIPID PANEL: CPT

## 2018-10-31 PROCEDURE — 99396 PREV VISIT EST AGE 40-64: CPT | Mod: S$GLB,,, | Performed by: FAMILY MEDICINE

## 2018-10-31 PROCEDURE — 84443 ASSAY THYROID STIM HORMONE: CPT

## 2018-10-31 PROCEDURE — 80053 COMPREHEN METABOLIC PANEL: CPT

## 2018-10-31 PROCEDURE — 99999 PR PBB SHADOW E&M-EST. PATIENT-LVL III: CPT | Mod: PBBFAC,,, | Performed by: FAMILY MEDICINE

## 2018-10-31 PROCEDURE — 36415 COLL VENOUS BLD VENIPUNCTURE: CPT | Mod: PO

## 2018-10-31 RX ORDER — TRETINOIN 0.25 MG/G
CREAM TOPICAL
COMMUNITY
Start: 2018-10-16

## 2018-10-31 NOTE — PROGRESS NOTES
"Subjective:      Patient ID: Anita Street is a 52 y.o. female.    Chief Complaint: Annual Exam    HPI  53 yo female here for annual exam.  She is seeing therapist/marriage counceling.  GERD off/on  Doing well on meds  Down 12 lbs from last year  Needs to update mammo  Sinus HAs    Past Medical History:   Diagnosis Date    Anxiety     Depression     GERD (gastroesophageal reflux disease)      Family History   Problem Relation Age of Onset    Anxiety disorder Mother     Depression Mother     Diabetes Father     Hypertension Father     Heart disease Father     Prostate cancer Father     Cataracts Daughter     Hyperlipidemia Brother     Colon cancer Neg Hx     Breast cancer Neg Hx      Past Surgical History:   Procedure Laterality Date    CHOLECYSTECTOMY      COLONOSCOPY N/A 11/13/2017    Procedure: COLONOSCOPY;  Surgeon: Yina Macias MD;  Location: [facility] ENDO;  Service: Endoscopy;  Laterality: N/A;    COLONOSCOPY N/A 11/13/2017    Performed by Yina Macias MD at [facility] END[facility]    HYSTERECTOMY      pelvic lap      Feritility     Social History     Tobacco Use    Smoking status: Never Smoker    Smokeless tobacco: Never Used   Substance Use Topics    Alcohol use: Yes     Comment: Rare Occ    Drug use: No       /82 (BP Location: Left arm, Patient Position: Sitting, BP Method: Large (Manual))   Pulse 66   Temp 97.2 °F (36.2 °C) (Tympanic)   Ht 5' 9" (1.753 m)   Wt 97.3 kg (214 lb 8.1 oz)   BMI 31.68 kg/m²     Review of Systems   Constitutional: Negative for activity change, appetite change, chills, diaphoresis, fatigue, fever and unexpected weight change.   HENT: Negative for ear pain, hearing loss, postnasal drip, rhinorrhea and tinnitus.    Eyes: Negative for visual disturbance.   Respiratory: Negative for cough, shortness of breath and wheezing.    Cardiovascular: Negative for chest pain, palpitations and leg swelling.   Gastrointestinal: Negative for abdominal distention.   Genitourinary: " Negative for dysuria, frequency, hematuria and urgency.   Musculoskeletal: Negative for back pain and joint swelling.   Neurological: Positive for headaches. Negative for weakness.        Sinus HAs   Hematological: Negative for adenopathy.   Psychiatric/Behavioral: Negative for confusion and decreased concentration.       Objective:     Physical Exam   Constitutional: She is oriented to person, place, and time. She appears well-developed and well-nourished. No distress.   HENT:   Right Ear: External ear normal.   Left Ear: External ear normal.   Nose: Nose normal.   Mouth/Throat: Oropharynx is clear and moist.   Eyes: Conjunctivae are normal. Pupils are equal, round, and reactive to light.   Neck: Normal range of motion. Neck supple. Carotid bruit is not present.   Cardiovascular: Normal rate, regular rhythm and normal heart sounds.   Pulmonary/Chest: Effort normal and breath sounds normal. No respiratory distress. She has no wheezes. She has no rales.   Abdominal: Soft. Bowel sounds are normal. She exhibits no distension. There is no tenderness. There is no guarding.   Musculoskeletal: She exhibits no edema.   Neurological: She is alert and oriented to person, place, and time. No cranial nerve deficit.   Skin: Skin is warm and dry. No rash noted.   Psychiatric: She has a normal mood and affect. Her behavior is normal. Judgment and thought content normal.   Nursing note and vitals reviewed.      Lab Results   Component Value Date    WBC 5.90 08/28/2017    HGB 13.8 08/28/2017    HCT 42.3 08/28/2017     08/28/2017    CHOL 198 08/28/2017    TRIG 124 08/28/2017    HDL 45 08/28/2017    ALT 20 08/28/2017    AST 21 08/28/2017     08/28/2017    K 4.7 08/28/2017     08/28/2017    CREATININE 0.9 08/28/2017    BUN 19 08/28/2017    CO2 29 08/28/2017    TSH 1.798 08/28/2017    HGBA1C 5.5 08/28/2017       Assessment:     1. Routine general medical examination at a health care facility         Plan:     Routine  general medical examination at a health care facility  -     CBC auto differential; Future; Expected date: 10/31/2018  -     Comprehensive metabolic panel; Future; Expected date: 10/31/2018  -     Hemoglobin A1c; Future; Expected date: 10/31/2018  -     Lipid panel; Future; Expected date: 10/31/2018  -     TSH; Future; Expected date: 10/31/2018  -     Vitamin D; Future; Expected date: 10/31/2018    Update annual labs  Cont current meds  Add flonase  Stay active/eat well and exercise  Update Mammo  F/U annually and PRN

## 2019-01-28 ENCOUNTER — PATIENT MESSAGE (OUTPATIENT)
Dept: INTERNAL MEDICINE | Facility: CLINIC | Age: 54
End: 2019-01-28

## 2019-01-29 RX ORDER — PHENTERMINE HYDROCHLORIDE 37.5 MG/1
37.5 TABLET ORAL
Qty: 30 TABLET | Refills: 0 | Status: SHIPPED | OUTPATIENT
Start: 2019-01-29 | End: 2019-02-28

## 2019-08-28 ENCOUNTER — PATIENT MESSAGE (OUTPATIENT)
Dept: INTERNAL MEDICINE | Facility: CLINIC | Age: 54
End: 2019-08-28

## 2019-08-30 ENCOUNTER — PATIENT OUTREACH (OUTPATIENT)
Dept: ADMINISTRATIVE | Facility: HOSPITAL | Age: 54
End: 2019-08-30

## 2019-09-05 ENCOUNTER — OFFICE VISIT (OUTPATIENT)
Dept: OBSTETRICS AND GYNECOLOGY | Facility: CLINIC | Age: 54
End: 2019-09-05
Payer: COMMERCIAL

## 2019-09-05 VITALS
HEIGHT: 69 IN | WEIGHT: 226 LBS | DIASTOLIC BLOOD PRESSURE: 82 MMHG | SYSTOLIC BLOOD PRESSURE: 140 MMHG | BODY MASS INDEX: 33.47 KG/M2

## 2019-09-05 DIAGNOSIS — Z01.419 WELL WOMAN EXAM WITH ROUTINE GYNECOLOGICAL EXAM: Primary | ICD-10-CM

## 2019-09-05 DIAGNOSIS — Z12.31 BREAST CANCER SCREENING BY MAMMOGRAM: ICD-10-CM

## 2019-09-05 PROCEDURE — 99999 PR PBB SHADOW E&M-EST. PATIENT-LVL III: CPT | Mod: PBBFAC,,, | Performed by: OBSTETRICS & GYNECOLOGY

## 2019-09-05 PROCEDURE — 99386 PR PREVENTIVE VISIT,NEW,40-64: ICD-10-PCS | Mod: S$GLB,,, | Performed by: OBSTETRICS & GYNECOLOGY

## 2019-09-05 PROCEDURE — 99386 PREV VISIT NEW AGE 40-64: CPT | Mod: S$GLB,,, | Performed by: OBSTETRICS & GYNECOLOGY

## 2019-09-05 PROCEDURE — 99999 PR PBB SHADOW E&M-EST. PATIENT-LVL III: ICD-10-PCS | Mod: PBBFAC,,, | Performed by: OBSTETRICS & GYNECOLOGY

## 2019-09-05 NOTE — PROGRESS NOTES
CHIEF COMPLAINT   Gynecologic Exam  Chief Complaint   Patient presents with    Menopause        HISTORY OF PRESENT ILLNESS  Anita Street   presents for annual exam. The patient has c/o hot flashes in recent mo. She has never taken HRT or recent bc (conception was w clomid then IVF). Has not tried CAM.     No LMP recorded. Patient has had a hysterectomy, cervix w severe cervical dysplasia DrLapelon  (was told there was no in situ cancer)   and Paps were normal since then.    Ovaries remain.       Reports no bowel movement irregularities from baseline, bloating, or weight loss.     Health Maintenance   Topic Date Due    Mammogram  2006    Colonoscopy  2022    Lipid Panel  10/31/2023    TETANUS VACCINE  2026       HISTORY  Patient Active Problem List   Diagnosis    Depression    Generalized anxiety disorder    Gastroesophageal reflux disease    Arthritis of both hips    Trochanteric bursitis of left hip    Greater trochanteric bursitis of right hip    Colon cancer screening       Past Medical History:   Diagnosis Date    Anxiety     Depression     GERD (gastroesophageal reflux disease)        Past Surgical History:   Procedure Laterality Date    CHOLECYSTECTOMY      COLONOSCOPY N/A 2017    [facility]    HYSTERECTOMY      pelvic lap      Feritility       Family History   Problem Relation Age of Onset    Anxiety disorder Mother     Depression Mother     Diabetes Father     Hypertension Father     Heart disease Father     Prostate cancer Father     Cataracts Daughter     Hyperlipidemia Brother     Colon cancer Neg Hx     Breast cancer Neg Hx        Social History     Socioeconomic History    Marital status:      Spouse name: Not on file    Number of children: 2    Years of education: Not on file    Highest education level: Not on file   Occupational History    Not on file   Social Needs    Financial resource  strain: Not on file    Food insecurity:     Worry: Not on file     Inability: Not on file    Transportation needs:     Medical: Not on file     Non-medical: Not on file   Tobacco Use    Smoking status: Never Smoker    Smokeless tobacco: Never Used   Substance and Sexual Activity    Alcohol use: Yes     Comment: Rare Occ    Drug use: No    Sexual activity: Yes     Partners: Male   Lifestyle    Physical activity:     Days per week: Not on file     Minutes per session: Not on file    Stress: Not on file   Relationships    Social connections:     Talks on phone: Not on file     Gets together: Not on file     Attends [employer] service: Not on file     Active member of club or organization: Not on file     Attends meetings of clubs or organizations: Not on file     Relationship status: Not on file   Other Topics Concern    Not on file   Social History Narrative    Not on file       Current Outpatient Medications   Medication Sig Dispense Refill    buPROPion (WELLBUTRIN XL) 150 MG TB24 tablet TAKE 1 TABLET (150 MG TOTAL) BY MOUTH ONCE DAILY. 90 tablet 4    FLUoxetine (PROZAC) 20 MG capsule Take 1 capsule (20 mg total) by mouth once daily. 90 capsule 4    omeprazole (PRILOSEC) 40 MG capsule Take 1 capsule (40 mg total) by mouth every morning. 90 capsule 1    tretinoin (RETIN-A) 0.025 % cream        No current facility-administered medications for this visit.        Review of patient's allergies indicates:  No Known Allergies      PHYSICAL EXAM     Vitals:    09/05/19 1108   BP: (!) 140/82       PAIN SCALE: 0/10 None    ROS:  GENERAL: No fever, chills, fatigability or weight loss.  ABDOMEN: Appetite fine. No weight loss. Denies diarrhea, abdominal pain, hematemesis or blood in stool.  No change in bowel movement pattern.  URINARY: No flank pain, dysuria or hematuria.  REPRODUCTIVE: No abnormal vaginal bleeding.  BREASTS: Breasts symmetric, nontender and no lumps detected.    PE:   APPEARANCE: Well nourished,  well developed, in no acute distress.  NECK: Neck symmetric without masses or thyromegaly.     NODES: No inguinal lymph node enlargement.  ABDOMEN: Soft. No tenderness or masses. No hepatosplenomegaly. No hernias.  BREASTS: Symmetrical, no skin changes or visible lesions. No palpable masses, nipple discharge or adenopathy bilaterally.    PELVIC:   VULVA: No lesions. Normal female genitalia.  URETHRAL MEATUS: Normal size and location, no lesions, no prolapse.  URETHRA: No masses, tenderness, prolapse or scarring.  PELVIC: Normal external female genitalia without lesions. Normal hair distribution. Adequate perineal body, normal urethral meatus. Vagina moist and well rugated without lesions or discharge. No significant cystocele or rectocele. Uterus and cervix surgically absent. Bimanual exam revealed no masses, tenderness or abnormality.    ANUS, PERINEUM: no masses, external hemorrhoids noted.      DIAGNOSIS:   1. Annual exam      PLAN:    Mammogram  Colonoscopy  dexa     MEDICATIONS PRESCRIBED: calcium vitamin D weight bearing exercise  rec'd CAM to try for now.   Discussed nutritional sciences and gave recs    COUNSELING:  Patient was counseled today on A.C.S. Pap guidelines and recommendations for yearly pelvic exams, mammograms and monthly self breast exams; to see her PCP for other health maintenance.     Pt counseled on signs and symptoms of cancer of the pelvic organs ie.ovarian/peritoneal, and to alert myself and my office if pt has any vaginal bleeding, pelvic/abdominal pain, persistent bloating, unexplained constipation, unexplained appetite and/or weight loss.   A full discussion of the benefit-risk ratio of hormonal replacement therapy was carried out. Improvement in vasomotor and other climacteric symptoms is discussed, including possible improvements in sleep and mood. Reduction of risk for osteoporosis was explained. We discussed the study data showing increased risk of thrombo-embolic events such as  myocardial infarction, stroke and also possibly breast cancer with estrogen replacement, and how this might affect her. The range of side effects such as breast tenderness, weight gain and including possible increases in lifetime risk of breast cancer and possible thrombotic complications was discussed. We also discussed ACOG's recommendation to use hormone replacement therapy for the relief of hot flashes alone and to be on the lowest dose possible for the shortest amount of time.  Alternative such as herbal and soy-based products were reviewed. All of her questions about this therapy were answered.  If taking hormones, pt knows and understands to discontinue immediately if she develops chest pain, heart problems, MI, DVT, CVA, breast cancer.         FOLLOW-UP: With me in 1 year.

## 2019-10-15 ENCOUNTER — PATIENT MESSAGE (OUTPATIENT)
Dept: OBSTETRICS AND GYNECOLOGY | Facility: CLINIC | Age: 54
End: 2019-10-15

## 2019-10-15 ENCOUNTER — TELEPHONE (OUTPATIENT)
Dept: OBSTETRICS AND GYNECOLOGY | Facility: CLINIC | Age: 54
End: 2019-10-15

## 2019-10-15 NOTE — TELEPHONE ENCOUNTER
Attempted to contact patient to offer assistance with scheduling a mammogram, no answer but LVM to return call.  Mammogram order is in and ready for scheduling.

## 2019-10-16 RX ORDER — TESTOSTERONE GEL, 1% 10 MG/G
1 GEL TRANSDERMAL DAILY
Qty: 5 G | Refills: 5 | Status: SHIPPED | OUTPATIENT
Start: 2019-10-16 | End: 2019-11-18 | Stop reason: SDUPTHER

## 2019-10-31 ENCOUNTER — OFFICE VISIT (OUTPATIENT)
Dept: OPHTHALMOLOGY | Facility: CLINIC | Age: 54
End: 2019-10-31
Payer: COMMERCIAL

## 2019-10-31 DIAGNOSIS — H10.13 ALLERGIC CONJUNCTIVITIS, BILATERAL: Primary | ICD-10-CM

## 2019-10-31 PROCEDURE — 92002 INTRM OPH EXAM NEW PATIENT: CPT | Mod: S$GLB,,, | Performed by: OPTOMETRIST

## 2019-10-31 PROCEDURE — 92002 PR EYE EXAM, NEW PATIENT,INTERMED: ICD-10-PCS | Mod: S$GLB,,, | Performed by: OPTOMETRIST

## 2019-10-31 PROCEDURE — 92310 CONTACT LENS FITTING OU: CPT | Mod: CSM,,, | Performed by: OPTOMETRIST

## 2019-10-31 PROCEDURE — 99999 PR PBB SHADOW E&M-EST. PATIENT-LVL I: ICD-10-PCS | Mod: PBBFAC,,, | Performed by: OPTOMETRIST

## 2019-10-31 PROCEDURE — 92310 PR CONTACT LENS FITTING (NO CHANGE): ICD-10-PCS | Mod: CSM,,, | Performed by: OPTOMETRIST

## 2019-10-31 PROCEDURE — 99999 PR PBB SHADOW E&M-EST. PATIENT-LVL I: CPT | Mod: PBBFAC,,, | Performed by: OPTOMETRIST

## 2019-10-31 PROCEDURE — 92015 DETERMINE REFRACTIVE STATE: CPT | Mod: S$GLB,,, | Performed by: OPTOMETRIST

## 2019-10-31 PROCEDURE — 92015 PR REFRACTION: ICD-10-PCS | Mod: S$GLB,,, | Performed by: OPTOMETRIST

## 2019-10-31 RX ORDER — FLUOROMETHOLONE 1 MG/ML
1 SUSPENSION/ DROPS OPHTHALMIC 2 TIMES DAILY PRN
Qty: 10 ML | Refills: 3 | Status: SHIPPED | OUTPATIENT
Start: 2019-10-31 | End: 2020-09-09

## 2019-10-31 NOTE — PROGRESS NOTES
HPI     Pt states that she is here for a contact and glasses rx  Also since Saturday her right eye has been irritated, draining clear mucus  She wears her contact in the right eye only, and that eye has been alittle   blurry    Last edited by Liz Dickerson on 10/31/2019 10:54 AM. (History)            Assessment /Plan     For exam results, see Encounter Report.    Allergic conjunctivitis, bilateral  -     fluorometholone 0.1% (FML) 0.1 % DrpS; Place 1 drop into both eyes 2 (two) times daily as needed.  Dispense: 10 mL; Refill: 3      Improving Allergic conj OD  Will try FML bid OD until resolved.    Mild change in CL Rx to improve near, may need OS lens eventually  Trials AV Oasys 1 day -0.75 OD  Trials AV Moist +0.50 OS    RTC 2 weeks DFE

## 2019-11-11 ENCOUNTER — OFFICE VISIT (OUTPATIENT)
Dept: OPHTHALMOLOGY | Facility: CLINIC | Age: 54
End: 2019-11-11
Payer: COMMERCIAL

## 2019-11-11 DIAGNOSIS — Z01.00 ENCOUNTER FOR EYE EXAM: Primary | ICD-10-CM

## 2019-11-11 DIAGNOSIS — Z46.0 ENCOUNTER FOR FITTING OR ADJUSTMENT OF SPECTACLES OR CONTACT LENSES: ICD-10-CM

## 2019-11-11 DIAGNOSIS — H52.4 BILATERAL PRESBYOPIA: ICD-10-CM

## 2019-11-11 DIAGNOSIS — H52.13 MYOPIA, BILATERAL: ICD-10-CM

## 2019-11-11 PROCEDURE — 92310 CONTACT LENS FITTING OU: CPT | Mod: CSM,,, | Performed by: OPTOMETRIST

## 2019-11-11 PROCEDURE — 92014 COMPRE OPH EXAM EST PT 1/>: CPT | Mod: S$GLB,,, | Performed by: OPTOMETRIST

## 2019-11-11 PROCEDURE — 92015 PR REFRACTION: ICD-10-PCS | Mod: S$GLB,,, | Performed by: OPTOMETRIST

## 2019-11-11 PROCEDURE — 92014 PR EYE EXAM, EST PATIENT,COMPREHESV: ICD-10-PCS | Mod: S$GLB,,, | Performed by: OPTOMETRIST

## 2019-11-11 PROCEDURE — 92015 DETERMINE REFRACTIVE STATE: CPT | Mod: S$GLB,,, | Performed by: OPTOMETRIST

## 2019-11-11 PROCEDURE — 92310 PR CONTACT LENS FITTING (NO CHANGE): ICD-10-PCS | Mod: CSM,,, | Performed by: OPTOMETRIST

## 2019-11-11 NOTE — PROGRESS NOTES
HPI     Pt here for  2 week f/u cls, dainel allergic conjunctivitis then DOA by TRF     Pt says tearing and mucus resolved after a few days use of FML   Pt says hard to adjust with the mono vision cls,today is only the 3rd day   she's tried wearing them     Last edited by Virginia Clemons, COA on 11/11/2019 10:28 AM. (History)            Assessment /Plan     For exam results, see Encounter Report.    Encounter for eye exam    Encounter for fitting or adjustment of spectacles or contact lenses    Myopia, bilateral    Bilateral presbyopia      No pathology, allergic symptoms have cleared, pt has stopped FML    AT prn CL wear.    Discussed CL charges.  Dispense Final Rx for glasses  Note changes CL Rx  RTC 1 year  Discussed above and answered questions.

## 2019-11-15 ENCOUNTER — PATIENT MESSAGE (OUTPATIENT)
Dept: OBSTETRICS AND GYNECOLOGY | Facility: CLINIC | Age: 54
End: 2019-11-15

## 2019-11-18 RX ORDER — TESTOSTERONE GEL, 1% 10 MG/G
1 GEL TRANSDERMAL DAILY
Qty: 5 G | Refills: 5 | Status: SHIPPED | OUTPATIENT
Start: 2019-11-18 | End: 2020-09-09

## 2019-11-23 ENCOUNTER — TELEPHONE (OUTPATIENT)
Dept: PHARMACY | Facility: CLINIC | Age: 54
End: 2019-11-23

## 2019-11-23 NOTE — TELEPHONE ENCOUNTER
"Spoke w/ Pt notifying her PA attempted on Testosterone prescription, however insurance indicated "Unable to respond with clinical questions PA has been resolved."  Tried to call insurance and department I need to speak to is closed until Monday.    Explained to patient that the insurance does not usually cover this medication, however we can offer it to her at a cheap price of $7.12 per one packet.    Patient may go ahead and pay for packet out of pocket while waiting on PA.  Will update patient and provider on pa status.    Thank You!   Anita Disla CPhT, B.A   Patient Care Advocate   [facility]   [email or web address]   Phone: [phone] Ext [phone]   Fax: [phone]     "

## 2019-12-05 ENCOUNTER — TELEPHONE (OUTPATIENT)
Dept: PHARMACY | Facility: CLINIC | Age: 54
End: 2019-12-05

## 2019-12-05 NOTE — TELEPHONE ENCOUNTER
Reason for call:      Testosterone 1% prescription PA was not needed.  Insurance requires specific NDC to be submitted to be dispensed.    [facility] is ordering medication for patient and it will be in on Friday 12/6/19.    Thank You!   Anita Disla CPhT, B.A  Patient Care Advocate   [facility]  [email or web address]  Phone: [phone] Ext [phone]  Fax: [phone]

## 2019-12-12 ENCOUNTER — HOSPITAL ENCOUNTER (OUTPATIENT)
Dept: RADIOLOGY | Facility: HOSPITAL | Age: 54
Discharge: HOME | End: 2019-12-12
Attending: OBSTETRICS & GYNECOLOGY
Payer: COMMERCIAL

## 2019-12-12 DIAGNOSIS — Z12.31 BREAST CANCER SCREENING BY MAMMOGRAM: ICD-10-CM

## 2019-12-12 PROCEDURE — 77063 MAMMO DIGITAL SCREENING BILAT WITH TOMOSYNTHESIS_CAD: ICD-10-PCS | Mod: 26,,, | Performed by: RADIOLOGY

## 2019-12-12 PROCEDURE — 77067 SCR MAMMO BI INCL CAD: CPT | Mod: 26,,, | Performed by: RADIOLOGY

## 2019-12-12 PROCEDURE — 77063 BREAST TOMOSYNTHESIS BI: CPT | Mod: 26,,, | Performed by: RADIOLOGY

## 2019-12-12 PROCEDURE — 77067 MAMMO DIGITAL SCREENING BILAT WITH TOMOSYNTHESIS_CAD: ICD-10-PCS | Mod: 26,,, | Performed by: RADIOLOGY

## 2019-12-12 PROCEDURE — 77067 SCR MAMMO BI INCL CAD: CPT | Mod: TC

## 2019-12-20 ENCOUNTER — PATIENT OUTREACH (OUTPATIENT)
Dept: ADMINISTRATIVE | Facility: HOSPITAL | Age: 54
End: 2019-12-20

## 2019-12-20 DIAGNOSIS — M16.0 ARTHRITIS OF BOTH HIPS: Primary | ICD-10-CM

## 2019-12-20 DIAGNOSIS — Z00.00 BLOOD TESTS FOR ROUTINE GENERAL PHYSICAL EXAMINATION: ICD-10-CM

## 2019-12-20 NOTE — PROGRESS NOTES
HM reviewed.   Immunizations abstracted.  Care Everywhere abstracted. No new results found.  There are no preventive care reminders to display for this patient.   Attempted to call patient to schedule annual labs. No answer. Sent portal message.  Previsit chart audit completed.  *KDL*

## 2019-12-31 PROBLEM — K63.5 COLON POLYPS: Status: ACTIVE | Noted: 2017-11-13

## 2020-01-06 ENCOUNTER — TELEPHONE (OUTPATIENT)
Dept: BARIATRICS | Facility: CLINIC | Age: 55
End: 2020-01-06

## 2020-01-06 ENCOUNTER — OFFICE VISIT (OUTPATIENT)
Dept: INTERNAL MEDICINE | Facility: CLINIC | Age: 55
End: 2020-01-06
Payer: COMMERCIAL

## 2020-01-06 VITALS
HEART RATE: 78 BPM | HEIGHT: 69 IN | TEMPERATURE: 99 F | BODY MASS INDEX: 33.27 KG/M2 | WEIGHT: 224.63 LBS | SYSTOLIC BLOOD PRESSURE: 132 MMHG | DIASTOLIC BLOOD PRESSURE: 86 MMHG

## 2020-01-06 DIAGNOSIS — Z00.00 ROUTINE GENERAL MEDICAL EXAMINATION AT A HEALTH CARE FACILITY: Primary | ICD-10-CM

## 2020-01-06 PROCEDURE — 90750 ZOSTER RECOMBINANT VACCINE: ICD-10-PCS | Mod: S$GLB,,, | Performed by: FAMILY MEDICINE

## 2020-01-06 PROCEDURE — 90471 ZOSTER RECOMBINANT VACCINE: ICD-10-PCS | Mod: S$GLB,,, | Performed by: FAMILY MEDICINE

## 2020-01-06 PROCEDURE — 90471 IMMUNIZATION ADMIN: CPT | Mod: S$GLB,,, | Performed by: FAMILY MEDICINE

## 2020-01-06 PROCEDURE — 90750 HZV VACC RECOMBINANT IM: CPT | Mod: S$GLB,,, | Performed by: FAMILY MEDICINE

## 2020-01-06 PROCEDURE — 99396 PR PREVENTIVE VISIT,EST,40-64: ICD-10-PCS | Mod: 25,S$GLB,, | Performed by: FAMILY MEDICINE

## 2020-01-06 PROCEDURE — 99396 PREV VISIT EST AGE 40-64: CPT | Mod: 25,S$GLB,, | Performed by: FAMILY MEDICINE

## 2020-01-06 PROCEDURE — 99999 PR PBB SHADOW E&M-EST. PATIENT-LVL III: ICD-10-PCS | Mod: PBBFAC,,, | Performed by: FAMILY MEDICINE

## 2020-01-06 PROCEDURE — 99999 PR PBB SHADOW E&M-EST. PATIENT-LVL III: CPT | Mod: PBBFAC,,, | Performed by: FAMILY MEDICINE

## 2020-01-06 RX ORDER — HYDROCHLOROTHIAZIDE 12.5 MG/1
12.5 TABLET ORAL DAILY
Qty: 30 TABLET | Refills: 11 | Status: SHIPPED | OUTPATIENT
Start: 2020-01-06 | End: 2020-09-09

## 2020-01-06 NOTE — PROGRESS NOTES
"Subjective:      Patient ID: Anita Street is a 54 y.o. female.    Chief Complaint: Annual Exam    HPI  55 yo female here for annual.  Mood has been good/she stopped her meds and feels well.  Is on some Testosterone//Dr. Bran.  She is seeing bariatrics in [place], considering the balloon/weight loss procedure.    Is doing Keto right now and exercising/which has helped her joints.  Occ back pain/tender spots with weight lifting  Her BP has been 130-140, fluctuates with her weight    Past Medical History:   Diagnosis Date    Anxiety     Depression     GERD (gastroesophageal reflux disease)      Family History   Problem Relation Age of Onset    Anxiety disorder Mother     Depression Mother     Diabetes Father     Hypertension Father     Heart disease Father     Prostate cancer Father     Cataracts Daughter     Hyperlipidemia Brother     Colon cancer Neg Hx     Breast cancer Neg Hx      Past Surgical History:   Procedure Laterality Date    CHOLECYSTECTOMY      COLONOSCOPY N/A 11/13/2017    Procedure: COLONOSCOPY;  Surgeon: Yina Macias MD;  Location: [facility];  Service: Endoscopy;  Laterality: N/A;    HYSTERECTOMY      severe cervical dysplasia, no cancer, several paps normal since    pelvic lap      Feritility     Social History     Tobacco Use    Smoking status: Never Smoker    Smokeless tobacco: Never Used   Substance Use Topics    Alcohol use: Yes     Comment: Rare Occ    Drug use: No       /86   Pulse 78   Temp 98.5 °F (36.9 °C) (Oral)   Ht 5' 9" (1.753 m)   Wt 101.9 kg (224 lb 10.4 oz)   BMI 33.17 kg/m²     Review of Systems   Constitutional: Negative for activity change, appetite change, chills, diaphoresis, fatigue, fever and unexpected weight change.   HENT: Negative for ear pain, hearing loss, postnasal drip, rhinorrhea and tinnitus.    Eyes: Negative for visual disturbance.   Respiratory: Negative for cough, shortness of breath and wheezing.    Cardiovascular: Negative for " chest pain, palpitations and leg swelling.   Gastrointestinal: Negative for abdominal distention, abdominal pain, constipation and diarrhea.   Genitourinary: Negative for dysuria, frequency, hematuria and urgency.   Musculoskeletal: Positive for back pain. Negative for joint swelling.   Neurological: Negative for weakness and headaches.   Hematological: Negative for adenopathy.   Psychiatric/Behavioral: Negative for confusion and decreased concentration.        Mood good//off of meds       Objective:     Physical Exam   Constitutional: She is oriented to person, place, and time. She appears well-developed and well-nourished. No distress.   HENT:   Right Ear: External ear normal.   Left Ear: External ear normal.   Nose: Nose normal.   Mouth/Throat: Oropharynx is clear and moist.   Eyes: Pupils are equal, round, and reactive to light. Conjunctivae are normal.   Neck: Normal range of motion. Neck supple. Carotid bruit is not present.   Cardiovascular: Normal rate, regular rhythm and normal heart sounds.   Pulmonary/Chest: Effort normal and breath sounds normal. No respiratory distress. She has no wheezes. She has no rales.   Abdominal: Soft. Bowel sounds are normal. She exhibits no distension. There is no tenderness. There is no guarding.   Musculoskeletal: She exhibits no edema.   Neurological: She is alert and oriented to person, place, and time. No cranial nerve deficit.   Skin: Skin is warm and dry. No rash noted.   Psychiatric: She has a normal mood and affect. Her behavior is normal. Judgment and thought content normal.   Nursing note and vitals reviewed.      Lab Results   Component Value Date    WBC 6.68 10/31/2018    HGB 13.3 10/31/2018    HCT 42.4 10/31/2018     10/31/2018    CHOL 168 10/31/2018    TRIG 107 10/31/2018    HDL 46 10/31/2018    ALT 13 10/31/2018    AST 15 10/31/2018     10/31/2018    K 4.5 10/31/2018     10/31/2018    CREATININE 0.8 10/31/2018    BUN 18 10/31/2018    CO2 27  10/31/2018    TSH 1.178 10/31/2018    HGBA1C 5.5 10/31/2018       Assessment:     1. Routine general medical examination at a health care facility         Plan:     Routine general medical examination at a health care facility    Other orders  -     (In Office Administered) Zoster Recombinant Vaccine  -     (In Office Administered) Zoster Recombinant Vaccine; Future; Expected date: 03/06/2020  -     hydroCHLOROthiazide (HYDRODIURIL) 12.5 MG Tab; Take 1 tablet (12.5 mg total) by mouth once daily.  Dispense: 30 tablet; Refill: 11    Start HCTZ 12.5mg and let MD know via portal in a few wks  Update labs  Work on weight loss//seeing Bariatrics this week  Mood stable off meds  On hormones/Dr. Bran  Start Shingrix series, #1 today  F/u annually and PRN

## 2020-01-06 NOTE — TELEPHONE ENCOUNTER
----- Message from Tj Carvajal sent at 1/6/2020  8:16 AM CST -----  Contact: Pt  Patient called to requesting to schedule a consult fot Gastric Balloon procedure     [phone] (home)

## 2020-02-21 ENCOUNTER — PATIENT MESSAGE (OUTPATIENT)
Dept: INTERNAL MEDICINE | Facility: CLINIC | Age: 55
End: 2020-02-21

## 2020-04-21 DIAGNOSIS — Z01.84 ANTIBODY RESPONSE EXAMINATION: ICD-10-CM

## 2020-04-23 ENCOUNTER — LAB VISIT (OUTPATIENT)
Dept: LAB | Facility: HOSPITAL | Age: 55
End: 2020-04-23
Attending: INTERNAL MEDICINE
Payer: COMMERCIAL

## 2020-04-23 DIAGNOSIS — Z01.84 ANTIBODY RESPONSE EXAMINATION: ICD-10-CM

## 2020-04-23 LAB — SARS-COV-2 IGG SERPL QL IA: NEGATIVE

## 2020-04-23 PROCEDURE — 86769 SARS-COV-2 COVID-19 ANTIBODY: CPT

## 2020-04-23 PROCEDURE — 36415 COLL VENOUS BLD VENIPUNCTURE: CPT

## 2020-09-09 ENCOUNTER — OFFICE VISIT (OUTPATIENT)
Dept: OPHTHALMOLOGY | Facility: CLINIC | Age: 55
End: 2020-09-09
Payer: COMMERCIAL

## 2020-09-09 DIAGNOSIS — H57.813 BROW PTOSIS, BILATERAL: ICD-10-CM

## 2020-09-09 DIAGNOSIS — H16.9 KERATITIS, BILATERAL: Primary | ICD-10-CM

## 2020-09-09 PROCEDURE — 92012 PR EYE EXAM, EST PATIENT,INTERMED: ICD-10-PCS | Mod: S$GLB,,, | Performed by: OPTOMETRIST

## 2020-09-09 PROCEDURE — 99999 PR PBB SHADOW E&M-EST. PATIENT-LVL II: ICD-10-PCS | Mod: PBBFAC,,, | Performed by: OPTOMETRIST

## 2020-09-09 PROCEDURE — 92012 INTRM OPH EXAM EST PATIENT: CPT | Mod: S$GLB,,, | Performed by: OPTOMETRIST

## 2020-09-09 PROCEDURE — 99999 PR PBB SHADOW E&M-EST. PATIENT-LVL II: CPT | Mod: PBBFAC,,, | Performed by: OPTOMETRIST

## 2020-09-09 RX ORDER — CLOBETASOL PROPIONATE 0.46 MG/ML
SOLUTION TOPICAL
COMMUNITY
Start: 2020-09-01

## 2020-09-09 RX ORDER — KETOCONAZOLE 20 MG/ML
SHAMPOO, SUSPENSION TOPICAL
COMMUNITY
Start: 2020-09-01

## 2020-09-09 RX ORDER — APRACLONIDINE HYDROCHLORIDE 5 MG/ML
SOLUTION/ DROPS OPHTHALMIC
COMMUNITY
Start: 2020-09-04 | End: 2021-06-07

## 2020-09-09 NOTE — PROGRESS NOTES
HPI     Pt accidentally got retin a cream in both of her eyes this morning, feels   irritated and dry    Last edited by Liz Dickerson on 9/9/2020  4:42 PM. (History)            Assessment /Plan     For exam results, see Encounter Report.    Keratitis, bilateral    Brow ptosis, bilateral      Keratitis from apraclonidine drops to help with ptosis from botox injection in forehead.  Pt also using wrinkle cream around eyes and may have rubbed eyes while sleeping.    Will try FML qid OU x 4-5 days.    RTC if worsens.

## 2020-10-05 RX ORDER — TESTOSTERONE GEL, 1% 10 MG/G
1 GEL TRANSDERMAL DAILY
Qty: 5 G | Refills: 5 | Status: SHIPPED | OUTPATIENT
Start: 2020-10-05 | End: 2021-10-16 | Stop reason: SDUPTHER

## 2020-12-15 ENCOUNTER — IMMUNIZATION (OUTPATIENT)
Dept: INTERNAL MEDICINE | Facility: CLINIC | Age: 55
End: 2020-12-15

## 2020-12-15 DIAGNOSIS — Z23 NEED FOR VACCINATION: ICD-10-CM

## 2020-12-15 PROCEDURE — 91300 COVID-19, MRNA, LNP-S, PF, 30 MCG/0.3 ML DOSE VACCINE: CPT | Mod: S$GLB,,, | Performed by: FAMILY MEDICINE

## 2020-12-15 PROCEDURE — 91300 COVID-19, MRNA, LNP-S, PF, 30 MCG/0.3 ML DOSE VACCINE: ICD-10-PCS | Mod: S$GLB,,, | Performed by: FAMILY MEDICINE

## 2020-12-15 PROCEDURE — 0001A COVID-19, MRNA, LNP-S, PF, 30 MCG/0.3 ML DOSE VACCINE: ICD-10-PCS | Mod: CV19,S$GLB,, | Performed by: FAMILY MEDICINE

## 2020-12-15 PROCEDURE — 0001A COVID-19, MRNA, LNP-S, PF, 30 MCG/0.3 ML DOSE VACCINE: CPT | Mod: CV19,S$GLB,, | Performed by: FAMILY MEDICINE

## 2020-12-16 DIAGNOSIS — Z12.31 OTHER SCREENING MAMMOGRAM: ICD-10-CM

## 2020-12-30 RX ORDER — TESTOSTERONE GEL, 1% 10 MG/G
1 GEL TRANSDERMAL DAILY
Qty: 5 G | Refills: 5 | Status: CANCELLED | OUTPATIENT
Start: 2020-12-30 | End: 2021-06-30

## 2021-01-05 ENCOUNTER — IMMUNIZATION (OUTPATIENT)
Dept: INTERNAL MEDICINE | Facility: CLINIC | Age: 56
End: 2021-01-05
Payer: COMMERCIAL

## 2021-01-05 DIAGNOSIS — Z23 NEED FOR VACCINATION: ICD-10-CM

## 2021-01-05 PROCEDURE — 91300 COVID-19, MRNA, LNP-S, PF, 30 MCG/0.3 ML DOSE VACCINE: CPT | Mod: PBBFAC | Performed by: FAMILY MEDICINE

## 2021-01-05 PROCEDURE — 0002A COVID-19, MRNA, LNP-S, PF, 30 MCG/0.3 ML DOSE VACCINE: CPT | Mod: PBBFAC | Performed by: FAMILY MEDICINE

## 2021-03-19 RX ORDER — METHOCARBAMOL 500 MG/1
500 TABLET, FILM COATED ORAL 3 TIMES DAILY
Qty: 45 TABLET | Refills: 0 | Status: SHIPPED | OUTPATIENT
Start: 2021-03-19 | End: 2022-06-20

## 2021-03-19 RX ORDER — MELOXICAM 7.5 MG/1
7.5 TABLET ORAL 2 TIMES DAILY PRN
Qty: 45 TABLET | Refills: 0 | Status: SHIPPED | OUTPATIENT
Start: 2021-03-19 | End: 2022-06-20

## 2021-03-19 RX ORDER — METHYLPREDNISOLONE 4 MG/1
TABLET ORAL
Qty: 1 PACKAGE | Refills: 0 | Status: SHIPPED | OUTPATIENT
Start: 2021-03-19 | End: 2021-04-09

## 2021-03-25 ENCOUNTER — PATIENT MESSAGE (OUTPATIENT)
Dept: ADMINISTRATIVE | Facility: HOSPITAL | Age: 56
End: 2021-03-25

## 2021-04-09 ENCOUNTER — OFFICE VISIT (OUTPATIENT)
Dept: OPHTHALMOLOGY | Facility: CLINIC | Age: 56
End: 2021-04-09
Payer: COMMERCIAL

## 2021-04-09 DIAGNOSIS — Z01.00 ENCOUNTER FOR EYE EXAM: Primary | ICD-10-CM

## 2021-04-09 DIAGNOSIS — H52.4 BILATERAL PRESBYOPIA: ICD-10-CM

## 2021-04-09 DIAGNOSIS — Z46.0 ENCOUNTER FOR FITTING OR ADJUSTMENT OF SPECTACLES OR CONTACT LENSES: ICD-10-CM

## 2021-04-09 DIAGNOSIS — H16.9 KERATITIS, BILATERAL: ICD-10-CM

## 2021-04-09 DIAGNOSIS — H52.13 MYOPIA, BILATERAL: ICD-10-CM

## 2021-04-09 PROCEDURE — 92014 COMPRE OPH EXAM EST PT 1/>: CPT | Mod: S$GLB,,, | Performed by: OPTOMETRIST

## 2021-04-09 PROCEDURE — 99999 PR PBB SHADOW E&M-EST. PATIENT-LVL II: ICD-10-PCS | Mod: PBBFAC,,, | Performed by: OPTOMETRIST

## 2021-04-09 PROCEDURE — 92015 PR REFRACTION: ICD-10-PCS | Mod: S$GLB,,, | Performed by: OPTOMETRIST

## 2021-04-09 PROCEDURE — 92014 PR EYE EXAM, EST PATIENT,COMPREHESV: ICD-10-PCS | Mod: S$GLB,,, | Performed by: OPTOMETRIST

## 2021-04-09 PROCEDURE — 92310 PR CONTACT LENS FITTING (NO CHANGE): ICD-10-PCS | Mod: CSM,S$GLB,, | Performed by: OPTOMETRIST

## 2021-04-09 PROCEDURE — 92310 CONTACT LENS FITTING OU: CPT | Mod: CSM,S$GLB,, | Performed by: OPTOMETRIST

## 2021-04-09 PROCEDURE — 99999 PR PBB SHADOW E&M-EST. PATIENT-LVL II: CPT | Mod: PBBFAC,,, | Performed by: OPTOMETRIST

## 2021-04-09 PROCEDURE — 92015 DETERMINE REFRACTIVE STATE: CPT | Mod: S$GLB,,, | Performed by: OPTOMETRIST

## 2021-04-30 ENCOUNTER — PATIENT MESSAGE (OUTPATIENT)
Dept: INTERNAL MEDICINE | Facility: CLINIC | Age: 56
End: 2021-04-30

## 2021-04-30 ENCOUNTER — TELEPHONE (OUTPATIENT)
Dept: INTERNAL MEDICINE | Facility: CLINIC | Age: 56
End: 2021-04-30

## 2021-04-30 DIAGNOSIS — Z12.31 ENCOUNTER FOR SCREENING MAMMOGRAM FOR MALIGNANT NEOPLASM OF BREAST: Primary | ICD-10-CM

## 2021-05-10 ENCOUNTER — HOSPITAL ENCOUNTER (OUTPATIENT)
Dept: RADIOLOGY | Facility: HOSPITAL | Age: 56
Discharge: HOME | End: 2021-05-10
Attending: FAMILY MEDICINE
Payer: COMMERCIAL

## 2021-05-10 VITALS — BODY MASS INDEX: 33.27 KG/M2 | WEIGHT: 224.63 LBS | HEIGHT: 69 IN

## 2021-05-10 DIAGNOSIS — Z12.31 ENCOUNTER FOR SCREENING MAMMOGRAM FOR MALIGNANT NEOPLASM OF BREAST: ICD-10-CM

## 2021-05-10 PROCEDURE — 77067 SCR MAMMO BI INCL CAD: CPT | Mod: TC

## 2021-05-10 PROCEDURE — 77067 MAMMO DIGITAL SCREENING BILAT WITH TOMO: ICD-10-PCS | Mod: 26,,, | Performed by: RADIOLOGY

## 2021-05-10 PROCEDURE — 77063 BREAST TOMOSYNTHESIS BI: CPT | Mod: 26,,, | Performed by: RADIOLOGY

## 2021-05-10 PROCEDURE — 77067 SCR MAMMO BI INCL CAD: CPT | Mod: 26,,, | Performed by: RADIOLOGY

## 2021-05-10 PROCEDURE — 77063 MAMMO DIGITAL SCREENING BILAT WITH TOMO: ICD-10-PCS | Mod: 26,,, | Performed by: RADIOLOGY

## 2021-06-07 ENCOUNTER — OFFICE VISIT (OUTPATIENT)
Dept: INTERNAL MEDICINE | Facility: CLINIC | Age: 56
End: 2021-06-07
Payer: COMMERCIAL

## 2021-06-07 ENCOUNTER — HOSPITAL ENCOUNTER (OUTPATIENT)
Dept: RADIOLOGY | Facility: HOSPITAL | Age: 56
Discharge: HOME | End: 2021-06-07
Attending: FAMILY MEDICINE
Payer: COMMERCIAL

## 2021-06-07 VITALS
SYSTOLIC BLOOD PRESSURE: 118 MMHG | BODY MASS INDEX: 26.53 KG/M2 | WEIGHT: 179.69 LBS | HEART RATE: 62 BPM | DIASTOLIC BLOOD PRESSURE: 80 MMHG | TEMPERATURE: 98 F | OXYGEN SATURATION: 96 %

## 2021-06-07 DIAGNOSIS — Z00.00 ANNUAL PHYSICAL EXAM: Primary | ICD-10-CM

## 2021-06-07 DIAGNOSIS — M40.205 KYPHOSIS OF THORACOLUMBAR REGION, UNSPECIFIED KYPHOSIS TYPE: ICD-10-CM

## 2021-06-07 PROCEDURE — 90471 ZOSTER RECOMBINANT VACCINE: ICD-10-PCS | Mod: S$GLB,,, | Performed by: FAMILY MEDICINE

## 2021-06-07 PROCEDURE — 72070 X-RAY EXAM THORAC SPINE 2VWS: CPT | Mod: 26,,, | Performed by: RADIOLOGY

## 2021-06-07 PROCEDURE — 99396 PREV VISIT EST AGE 40-64: CPT | Mod: 25,S$GLB,, | Performed by: FAMILY MEDICINE

## 2021-06-07 PROCEDURE — 72070 XR THORACIC SPINE AP LATERAL: ICD-10-PCS | Mod: 26,,, | Performed by: RADIOLOGY

## 2021-06-07 PROCEDURE — 1126F PR PAIN SEVERITY QUANTIFIED, NO PAIN PRESENT: ICD-10-PCS | Mod: S$GLB,,, | Performed by: FAMILY MEDICINE

## 2021-06-07 PROCEDURE — 99396 PR PREVENTIVE VISIT,EST,40-64: ICD-10-PCS | Mod: 25,S$GLB,, | Performed by: FAMILY MEDICINE

## 2021-06-07 PROCEDURE — 99999 PR PBB SHADOW E&M-EST. PATIENT-LVL III: CPT | Mod: PBBFAC,,, | Performed by: FAMILY MEDICINE

## 2021-06-07 PROCEDURE — 1126F AMNT PAIN NOTED NONE PRSNT: CPT | Mod: S$GLB,,, | Performed by: FAMILY MEDICINE

## 2021-06-07 PROCEDURE — 72100 X-RAY EXAM L-S SPINE 2/3 VWS: CPT | Mod: 26,,, | Performed by: RADIOLOGY

## 2021-06-07 PROCEDURE — 72100 X-RAY EXAM L-S SPINE 2/3 VWS: CPT | Mod: TC,FY,PO

## 2021-06-07 PROCEDURE — 90471 IMMUNIZATION ADMIN: CPT | Mod: S$GLB,,, | Performed by: FAMILY MEDICINE

## 2021-06-07 PROCEDURE — 99999 PR PBB SHADOW E&M-EST. PATIENT-LVL III: ICD-10-PCS | Mod: PBBFAC,,, | Performed by: FAMILY MEDICINE

## 2021-06-07 PROCEDURE — 72070 X-RAY EXAM THORAC SPINE 2VWS: CPT | Mod: TC,FY,PO

## 2021-06-07 PROCEDURE — 72100 XR LUMBAR SPINE AP AND LATERAL: ICD-10-PCS | Mod: 26,,, | Performed by: RADIOLOGY

## 2021-06-07 PROCEDURE — 3008F BODY MASS INDEX DOCD: CPT | Mod: CPTII,S$GLB,, | Performed by: FAMILY MEDICINE

## 2021-06-07 PROCEDURE — 90750 ZOSTER RECOMBINANT VACCINE: ICD-10-PCS | Mod: S$GLB,,, | Performed by: FAMILY MEDICINE

## 2021-06-07 PROCEDURE — 90750 HZV VACC RECOMBINANT IM: CPT | Mod: S$GLB,,, | Performed by: FAMILY MEDICINE

## 2021-06-07 PROCEDURE — 3008F PR BODY MASS INDEX (BMI) DOCUMENTED: ICD-10-PCS | Mod: CPTII,S$GLB,, | Performed by: FAMILY MEDICINE

## 2021-07-26 ENCOUNTER — PATIENT MESSAGE (OUTPATIENT)
Dept: INTERNAL MEDICINE | Facility: CLINIC | Age: 56
End: 2021-07-26

## 2021-07-26 DIAGNOSIS — Z01.84 COVID-19 VIRUS IGG ANTIBODY TEST RESULT UNKNOWN: Primary | ICD-10-CM

## 2021-08-11 ENCOUNTER — LAB VISIT (OUTPATIENT)
Dept: LAB | Facility: HOSPITAL | Age: 56
End: 2021-08-11
Attending: FAMILY MEDICINE
Payer: COMMERCIAL

## 2021-08-11 DIAGNOSIS — Z01.84 COVID-19 VIRUS IGG ANTIBODY TEST RESULT UNKNOWN: ICD-10-CM

## 2021-08-11 LAB
SARS-COV-2 IGG SERPL IA-ACNC: 366.7 AU/ML
SARS-COV-2 IGG SERPL QL IA: POSITIVE

## 2021-08-11 PROCEDURE — 86769 SARS-COV-2 COVID-19 ANTIBODY: CPT | Performed by: FAMILY MEDICINE

## 2021-08-11 PROCEDURE — 36415 COLL VENOUS BLD VENIPUNCTURE: CPT | Performed by: FAMILY MEDICINE

## 2021-09-09 ENCOUNTER — CLINICAL SUPPORT (OUTPATIENT)
Dept: URGENT CARE | Facility: CLINIC | Age: 56
End: 2021-09-09
Payer: COMMERCIAL

## 2021-09-09 DIAGNOSIS — Z11.52 ENCOUNTER FOR SCREENING FOR SEVERE ACUTE RESPIRATORY SYNDROME CORONAVIRUS 2 (SARS-COV-2) INFECTION: Primary | ICD-10-CM

## 2021-09-09 LAB
CTP QC/QA: YES
SARS-COV-2 RDRP RESP QL NAA+PROBE: NEGATIVE

## 2021-09-09 PROCEDURE — 99211 PR OFFICE/OUTPT VISIT, EST, LEVL I: ICD-10-PCS | Mod: S$GLB,,, | Performed by: PHYSICIAN ASSISTANT

## 2021-09-09 PROCEDURE — 99211 OFF/OP EST MAY X REQ PHY/QHP: CPT | Mod: S$GLB,,, | Performed by: PHYSICIAN ASSISTANT

## 2021-09-09 PROCEDURE — U0002: ICD-10-PCS | Mod: QW,S$GLB,, | Performed by: PHYSICIAN ASSISTANT

## 2021-09-09 PROCEDURE — U0002 COVID-19 LAB TEST NON-CDC: HCPCS | Mod: QW,S$GLB,, | Performed by: PHYSICIAN ASSISTANT

## 2021-09-21 ENCOUNTER — OFFICE VISIT (OUTPATIENT)
Dept: URGENT CARE | Facility: CLINIC | Age: 56
End: 2021-09-21
Payer: COMMERCIAL

## 2021-09-21 VITALS
RESPIRATION RATE: 18 BRPM | TEMPERATURE: 98 F | OXYGEN SATURATION: 100 % | SYSTOLIC BLOOD PRESSURE: 141 MMHG | HEIGHT: 69 IN | HEART RATE: 71 BPM | BODY MASS INDEX: 26.51 KG/M2 | WEIGHT: 179 LBS | DIASTOLIC BLOOD PRESSURE: 83 MMHG

## 2021-09-21 DIAGNOSIS — R03.0 ELEVATED BLOOD PRESSURE READING: ICD-10-CM

## 2021-09-21 DIAGNOSIS — B34.9 ACUTE VIRAL SYNDROME: Primary | ICD-10-CM

## 2021-09-21 DIAGNOSIS — R09.81 SINUS CONGESTION: ICD-10-CM

## 2021-09-21 DIAGNOSIS — Z20.822 LAB TEST NEGATIVE FOR COVID-19 VIRUS: ICD-10-CM

## 2021-09-21 DIAGNOSIS — H65.193 ACUTE MIDDLE EAR EFFUSION, BILATERAL: ICD-10-CM

## 2021-09-21 DIAGNOSIS — R11.0 NAUSEA: ICD-10-CM

## 2021-09-21 LAB
CTP QC/QA: YES
SARS-COV-2 RDRP RESP QL NAA+PROBE: NEGATIVE

## 2021-09-21 PROCEDURE — 99213 OFFICE O/P EST LOW 20 MIN: CPT | Mod: S$GLB,,, | Performed by: PHYSICIAN ASSISTANT

## 2021-09-21 PROCEDURE — 1160F PR REVIEW ALL MEDS BY PRESCRIBER/CLIN PHARMACIST DOCUMENTED: ICD-10-PCS | Mod: CPTII,S$GLB,, | Performed by: PHYSICIAN ASSISTANT

## 2021-09-21 PROCEDURE — 3008F PR BODY MASS INDEX (BMI) DOCUMENTED: ICD-10-PCS | Mod: CPTII,S$GLB,, | Performed by: PHYSICIAN ASSISTANT

## 2021-09-21 PROCEDURE — 3008F BODY MASS INDEX DOCD: CPT | Mod: CPTII,S$GLB,, | Performed by: PHYSICIAN ASSISTANT

## 2021-09-21 PROCEDURE — U0002 COVID-19 LAB TEST NON-CDC: HCPCS | Mod: QW,S$GLB,, | Performed by: PHYSICIAN ASSISTANT

## 2021-09-21 PROCEDURE — 3077F PR MOST RECENT SYSTOLIC BLOOD PRESSURE >= 140 MM HG: ICD-10-PCS | Mod: CPTII,S$GLB,, | Performed by: PHYSICIAN ASSISTANT

## 2021-09-21 PROCEDURE — 3079F PR MOST RECENT DIASTOLIC BLOOD PRESSURE 80-89 MM HG: ICD-10-PCS | Mod: CPTII,S$GLB,, | Performed by: PHYSICIAN ASSISTANT

## 2021-09-21 PROCEDURE — 3044F PR MOST RECENT HEMOGLOBIN A1C LEVEL <7.0%: ICD-10-PCS | Mod: CPTII,S$GLB,, | Performed by: PHYSICIAN ASSISTANT

## 2021-09-21 PROCEDURE — 3077F SYST BP >= 140 MM HG: CPT | Mod: CPTII,S$GLB,, | Performed by: PHYSICIAN ASSISTANT

## 2021-09-21 PROCEDURE — 3079F DIAST BP 80-89 MM HG: CPT | Mod: CPTII,S$GLB,, | Performed by: PHYSICIAN ASSISTANT

## 2021-09-21 PROCEDURE — 3044F HG A1C LEVEL LT 7.0%: CPT | Mod: CPTII,S$GLB,, | Performed by: PHYSICIAN ASSISTANT

## 2021-09-21 PROCEDURE — U0002: ICD-10-PCS | Mod: QW,S$GLB,, | Performed by: PHYSICIAN ASSISTANT

## 2021-09-21 PROCEDURE — 99213 PR OFFICE/OUTPT VISIT, EST, LEVL III, 20-29 MIN: ICD-10-PCS | Mod: S$GLB,,, | Performed by: PHYSICIAN ASSISTANT

## 2021-09-21 PROCEDURE — 1159F MED LIST DOCD IN RCRD: CPT | Mod: CPTII,S$GLB,, | Performed by: PHYSICIAN ASSISTANT

## 2021-09-21 PROCEDURE — 1159F PR MEDICATION LIST DOCUMENTED IN MEDICAL RECORD: ICD-10-PCS | Mod: CPTII,S$GLB,, | Performed by: PHYSICIAN ASSISTANT

## 2021-09-21 PROCEDURE — 1160F RVW MEDS BY RX/DR IN RCRD: CPT | Mod: CPTII,S$GLB,, | Performed by: PHYSICIAN ASSISTANT

## 2021-09-24 ENCOUNTER — TELEPHONE (OUTPATIENT)
Dept: URGENT CARE | Facility: CLINIC | Age: 56
End: 2021-09-24

## 2021-10-12 ENCOUNTER — PATIENT MESSAGE (OUTPATIENT)
Dept: OBSTETRICS AND GYNECOLOGY | Facility: CLINIC | Age: 56
End: 2021-10-12
Payer: COMMERCIAL

## 2021-10-16 RX ORDER — TESTOSTERONE GEL, 1% 10 MG/G
1 GEL TRANSDERMAL DAILY
Qty: 5 G | Refills: 5 | Status: SHIPPED | OUTPATIENT
Start: 2021-10-16 | End: 2021-10-29

## 2021-10-16 RX ORDER — ESTRADIOL 0.1 MG/G
0.5 CREAM VAGINAL DAILY
Qty: 42.5 G | Refills: 1 | Status: SHIPPED | OUTPATIENT
Start: 2021-10-16 | End: 2022-04-07 | Stop reason: SDUPTHER

## 2021-10-29 RX ORDER — TESTOSTERONE GEL, 1% 10 MG/G
1 GEL TRANSDERMAL DAILY
Qty: 5 G | Refills: 5 | Status: SHIPPED | OUTPATIENT
Start: 2021-10-29 | End: 2022-04-07 | Stop reason: SDUPTHER

## 2021-12-10 ENCOUNTER — TELEPHONE (OUTPATIENT)
Dept: OBSTETRICS AND GYNECOLOGY | Facility: CLINIC | Age: 56
End: 2021-12-10
Payer: COMMERCIAL

## 2022-02-01 ENCOUNTER — PATIENT MESSAGE (OUTPATIENT)
Dept: OPHTHALMOLOGY | Facility: CLINIC | Age: 57
End: 2022-02-01
Payer: COMMERCIAL

## 2022-02-01 NOTE — TELEPHONE ENCOUNTER
Changing to distance vision OU.    AV Oasys 8.4 OD -0.75 OS -1.00    Okay to dispense boxes after trial lens.

## 2022-03-11 ENCOUNTER — TELEPHONE (OUTPATIENT)
Dept: OTOLARYNGOLOGY | Facility: CLINIC | Age: 57
End: 2022-03-11
Payer: COMMERCIAL

## 2022-03-11 ENCOUNTER — PATIENT MESSAGE (OUTPATIENT)
Dept: ORTHOPEDICS | Facility: CLINIC | Age: 57
End: 2022-03-11
Payer: COMMERCIAL

## 2022-03-11 ENCOUNTER — TELEPHONE (OUTPATIENT)
Dept: OBSTETRICS AND GYNECOLOGY | Facility: CLINIC | Age: 57
End: 2022-03-11
Payer: COMMERCIAL

## 2022-03-11 NOTE — TELEPHONE ENCOUNTER
----- Message from Yesika Morelos sent at 3/11/2022  1:30 PM CST -----  Regarding: rtn call  Contact: pt  Pt returning call to office. Pt can be reached at [phone]

## 2022-03-11 NOTE — TELEPHONE ENCOUNTER
----- Message from Anita Street NP sent at 3/11/2022  9:33 AM CST -----  Regarding: inner ear concerns  Morning,      I would like to schedule an appointment with Dr. Edouard regarding balance concerns and presumed inner ear problems. 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     Please call me in reference to an appointment.      Anita Street,  [phone]

## 2022-03-11 NOTE — TELEPHONE ENCOUNTER
Spoke wt pt. Pt is scheduled for a virtual appointment regarding hormone therapy on 4/14/22 @ 3:30 w Dr. Tillman.

## 2022-03-11 NOTE — TELEPHONE ENCOUNTER
Spoke with pt to schedule audiogram, VNG screen, and ENT appt for 3/17. Received verbal confirmation from pt.

## 2022-03-14 ENCOUNTER — TELEPHONE (OUTPATIENT)
Dept: OBSTETRICS AND GYNECOLOGY | Facility: CLINIC | Age: 57
End: 2022-03-14
Payer: COMMERCIAL

## 2022-03-14 ENCOUNTER — OFFICE VISIT (OUTPATIENT)
Dept: INTERNAL MEDICINE | Facility: CLINIC | Age: 57
End: 2022-03-14
Payer: COMMERCIAL

## 2022-03-14 ENCOUNTER — PATIENT MESSAGE (OUTPATIENT)
Dept: OBSTETRICS AND GYNECOLOGY | Facility: CLINIC | Age: 57
End: 2022-03-14
Payer: COMMERCIAL

## 2022-03-14 VITALS
WEIGHT: 181.44 LBS | HEART RATE: 82 BPM | DIASTOLIC BLOOD PRESSURE: 80 MMHG | HEIGHT: 69 IN | TEMPERATURE: 98 F | SYSTOLIC BLOOD PRESSURE: 136 MMHG | BODY MASS INDEX: 26.87 KG/M2

## 2022-03-14 DIAGNOSIS — I10 ESSENTIAL HYPERTENSION: ICD-10-CM

## 2022-03-14 DIAGNOSIS — J34.89 SINUS PRESSURE: ICD-10-CM

## 2022-03-14 DIAGNOSIS — R26.89 BALANCE PROBLEM: ICD-10-CM

## 2022-03-14 DIAGNOSIS — F40.243 ANXIETY WITH FLYING: Primary | ICD-10-CM

## 2022-03-14 DIAGNOSIS — M54.50 LOW BACK PAIN, UNSPECIFIED BACK PAIN LATERALITY, UNSPECIFIED CHRONICITY, UNSPECIFIED WHETHER SCIATICA PRESENT: Primary | ICD-10-CM

## 2022-03-14 DIAGNOSIS — R68.2 DRY MOUTH: ICD-10-CM

## 2022-03-14 PROCEDURE — 3008F PR BODY MASS INDEX (BMI) DOCUMENTED: ICD-10-PCS | Mod: CPTII,S$GLB,, | Performed by: NURSE PRACTITIONER

## 2022-03-14 PROCEDURE — 3079F PR MOST RECENT DIASTOLIC BLOOD PRESSURE 80-89 MM HG: ICD-10-PCS | Mod: CPTII,S$GLB,, | Performed by: NURSE PRACTITIONER

## 2022-03-14 PROCEDURE — 99999 PR PBB SHADOW E&M-EST. PATIENT-LVL III: CPT | Mod: PBBFAC,,, | Performed by: NURSE PRACTITIONER

## 2022-03-14 PROCEDURE — 1159F MED LIST DOCD IN RCRD: CPT | Mod: CPTII,S$GLB,, | Performed by: NURSE PRACTITIONER

## 2022-03-14 PROCEDURE — 99214 OFFICE O/P EST MOD 30 MIN: CPT | Mod: S$GLB,,, | Performed by: NURSE PRACTITIONER

## 2022-03-14 PROCEDURE — 3008F BODY MASS INDEX DOCD: CPT | Mod: CPTII,S$GLB,, | Performed by: NURSE PRACTITIONER

## 2022-03-14 PROCEDURE — 3075F SYST BP GE 130 - 139MM HG: CPT | Mod: CPTII,S$GLB,, | Performed by: NURSE PRACTITIONER

## 2022-03-14 PROCEDURE — 99999 PR PBB SHADOW E&M-EST. PATIENT-LVL III: ICD-10-PCS | Mod: PBBFAC,,, | Performed by: NURSE PRACTITIONER

## 2022-03-14 PROCEDURE — 99214 PR OFFICE/OUTPT VISIT, EST, LEVL IV, 30-39 MIN: ICD-10-PCS | Mod: S$GLB,,, | Performed by: NURSE PRACTITIONER

## 2022-03-14 PROCEDURE — 1160F PR REVIEW ALL MEDS BY PRESCRIBER/CLIN PHARMACIST DOCUMENTED: ICD-10-PCS | Mod: CPTII,S$GLB,, | Performed by: NURSE PRACTITIONER

## 2022-03-14 PROCEDURE — 1159F PR MEDICATION LIST DOCUMENTED IN MEDICAL RECORD: ICD-10-PCS | Mod: CPTII,S$GLB,, | Performed by: NURSE PRACTITIONER

## 2022-03-14 PROCEDURE — 3079F DIAST BP 80-89 MM HG: CPT | Mod: CPTII,S$GLB,, | Performed by: NURSE PRACTITIONER

## 2022-03-14 PROCEDURE — 1160F RVW MEDS BY RX/DR IN RCRD: CPT | Mod: CPTII,S$GLB,, | Performed by: NURSE PRACTITIONER

## 2022-03-14 PROCEDURE — 3075F PR MOST RECENT SYSTOLIC BLOOD PRESS GE 130-139MM HG: ICD-10-PCS | Mod: CPTII,S$GLB,, | Performed by: NURSE PRACTITIONER

## 2022-03-14 RX ORDER — ALPRAZOLAM 0.25 MG/1
0.25 TABLET ORAL 2 TIMES DAILY PRN
Qty: 20 TABLET | Refills: 0 | Status: SHIPPED | OUTPATIENT
Start: 2022-03-14 | End: 2022-06-29

## 2022-03-14 RX ORDER — AMLODIPINE BESYLATE 2.5 MG/1
2.5 TABLET ORAL NIGHTLY
Qty: 30 TABLET | Refills: 11 | Status: SHIPPED | OUTPATIENT
Start: 2022-03-14 | End: 2022-06-20

## 2022-03-14 RX ORDER — METHYLPREDNISOLONE 4 MG/1
TABLET ORAL
Qty: 21 TABLET | Refills: 0 | Status: SHIPPED | OUTPATIENT
Start: 2022-03-14 | End: 2022-04-06 | Stop reason: ALTCHOICE

## 2022-03-14 NOTE — PROGRESS NOTES
"Subjective:       Patient ID: Anita Street is a 56 y.o. female.    Chief Complaint: Follow-up    Patient here with balance issues  Started after cruise in sept  Feels woozy  Feels that she walks to one side, feels that she stumbles  Has upcoming visit with ent this week  Feeling some sinus pressure and ear tpressure this week, wants to consider medrol dose pack    Has noticed dry mouth x over 6 months   notices her smacking  Has had to buy otc products for moisture to her mouth  Drinks lots of water  Wants labs for sjogrens syndrome    Flying soon, wants xanax refill for flight    Follow-up  Pertinent negatives include no arthralgias, chest pain, headaches, joint swelling, neck pain, vomiting or weakness.       /80   Pulse 82   Temp 98 °F (36.7 °C)   Ht 5' 9" (1.753 m)   Wt 82.3 kg (181 lb 7 oz)   LMP  (LMP Unknown)   BMI 26.79 kg/m²     Review of Systems   Constitutional: Negative for activity change and unexpected weight change.   HENT: Negative for hearing loss, rhinorrhea and trouble swallowing.         + dry mouth   Eyes: Negative for discharge and visual disturbance.   Respiratory: Negative for chest tightness and wheezing.    Cardiovascular: Negative for chest pain and palpitations.   Gastrointestinal: Negative for blood in stool, constipation, diarrhea and vomiting.   Endocrine: Negative for polydipsia and polyuria.   Genitourinary: Negative for difficulty urinating, dysuria, hematuria and menstrual problem.   Musculoskeletal: Negative for arthralgias, joint swelling and neck pain.   Neurological: Negative for weakness and headaches.        + woozy feeling   Psychiatric/Behavioral: Negative for confusion and dysphoric mood. The patient is nervous/anxious (regarding flight).        Objective:      Physical Exam  Vitals and nursing note reviewed.   Constitutional:       General: She is not in acute distress.     Appearance: She is well-developed. She is not diaphoretic.   HENT:      " Head: Normocephalic and atraumatic.      Right Ear: Tympanic membrane, ear canal and external ear normal.      Left Ear: Tympanic membrane, ear canal and external ear normal.      Mouth/Throat:      Pharynx: No oropharyngeal exudate.   Eyes:      General:         Right eye: No discharge.         Left eye: No discharge.      Conjunctiva/sclera: Conjunctivae normal.   Cardiovascular:      Rate and Rhythm: Normal rate and regular rhythm.      Heart sounds: Normal heart sounds. No murmur heard.  Pulmonary:      Effort: Pulmonary effort is normal. No respiratory distress.      Breath sounds: Normal breath sounds. No wheezing.   Abdominal:      General: There is no distension.      Palpations: Abdomen is soft.   Musculoskeletal:         General: No tenderness. Normal range of motion.   Skin:     General: Skin is warm and dry.      Findings: No erythema or rash.   Neurological:      Mental Status: She is alert and oriented to person, place, and time.   Psychiatric:         Behavior: Behavior normal.         Thought Content: Thought content normal.         Judgment: Judgment normal.         Assessment:       1. Anxiety with flying    2. Dry mouth    3. Sinus pressure    4. Balance problem        Plan:       Anita was seen today for follow-up.    Diagnoses and all orders for this visit:    Anxiety with flying  -     ALPRAZolam (XANAX) 0.25 MG tablet; Take 1 tablet (0.25 mg total) by mouth 2 (two) times daily as needed (flight anxiety).   checked  Xanax prior to flight given    Dry mouth  -     ANTI -SSA ANTIBODY; Future  -     ANTI-SSB ANTIBODY; Future  Discussed with Dr. SIMPSON, will order labs    Sinus pressure  Medrol pack per request    Balance problem  Follow up with ent    Essential Hypertension  hctz causing dry mouth, will send amlodipine  Patient to monitor pressures    Other orders  -     methylPREDNISolone (MEDROL DOSEPACK) 4 mg tablet; use as directed  -     amLODIPine (NORVASC) 2.5 MG tablet; Take 1 tablet  (2.5 mg total) by mouth every evening.

## 2022-03-14 NOTE — TELEPHONE ENCOUNTER
----- Message from Jaida Tillman MD sent at 3/14/2022 10:13 AM CDT -----  Regarding: FW: Virtual appointment for hormone  Please work her in for a virtual hormone visit in the next 2-3 weeks.    ----- Message -----  From: Anita Street NP  Sent: 3/11/2022   9:33 AM CDT  To: Jaida Tillman MD, [facility] Staff  Subject: Virtual appointment for hormone                  Morning,    I spoke with Dr. Tillman briefly regarding a virtual appointment for possible hormone therapy.    Please call me regarding scheduling an appointment.      Thank you,  Anita Street  [phone]

## 2022-03-14 NOTE — TELEPHONE ENCOUNTER
Attempted to contact patient, no answer. Left patient voice mail to return call to clinic.  Pt needs to be rescheduled for 2-3 weeks

## 2022-03-15 ENCOUNTER — PATIENT MESSAGE (OUTPATIENT)
Dept: INTERNAL MEDICINE | Facility: CLINIC | Age: 57
End: 2022-03-15
Payer: COMMERCIAL

## 2022-03-15 DIAGNOSIS — R26.89 BALANCE PROBLEM: Primary | ICD-10-CM

## 2022-03-17 ENCOUNTER — OFFICE VISIT (OUTPATIENT)
Dept: OTOLARYNGOLOGY | Facility: CLINIC | Age: 57
End: 2022-03-17
Payer: COMMERCIAL

## 2022-03-17 ENCOUNTER — LAB VISIT (OUTPATIENT)
Dept: LAB | Facility: HOSPITAL | Age: 57
End: 2022-03-17
Attending: FAMILY MEDICINE
Payer: COMMERCIAL

## 2022-03-17 ENCOUNTER — CLINICAL SUPPORT (OUTPATIENT)
Dept: AUDIOLOGY | Facility: CLINIC | Age: 57
End: 2022-03-17
Payer: COMMERCIAL

## 2022-03-17 VITALS
DIASTOLIC BLOOD PRESSURE: 86 MMHG | HEART RATE: 76 BPM | WEIGHT: 181.69 LBS | BODY MASS INDEX: 26.91 KG/M2 | HEIGHT: 69 IN | SYSTOLIC BLOOD PRESSURE: 145 MMHG

## 2022-03-17 DIAGNOSIS — R26.89 BALANCE PROBLEM: ICD-10-CM

## 2022-03-17 DIAGNOSIS — R42 VERTIGO: Primary | ICD-10-CM

## 2022-03-17 DIAGNOSIS — H81.4 VERTIGO OF CENTRAL ORIGIN: ICD-10-CM

## 2022-03-17 DIAGNOSIS — H90.42 SENSORINEURAL HEARING LOSS (SNHL) OF LEFT EAR WITH UNRESTRICTED HEARING OF RIGHT EAR: Primary | ICD-10-CM

## 2022-03-17 DIAGNOSIS — R42 VERTIGO: ICD-10-CM

## 2022-03-17 LAB
ALBUMIN SERPL BCP-MCNC: 4.3 G/DL (ref 3.5–5.2)
ALP SERPL-CCNC: 67 U/L (ref 55–135)
ALT SERPL W/O P-5'-P-CCNC: 18 U/L (ref 10–44)
ANION GAP SERPL CALC-SCNC: 9 MMOL/L (ref 8–16)
AST SERPL-CCNC: 18 U/L (ref 10–40)
BASOPHILS # BLD AUTO: 0.05 K/UL (ref 0–0.2)
BASOPHILS NFR BLD: 0.9 % (ref 0–1.9)
BILIRUB SERPL-MCNC: 0.3 MG/DL (ref 0.1–1)
BUN SERPL-MCNC: 19 MG/DL (ref 6–20)
CALCIUM SERPL-MCNC: 10.1 MG/DL (ref 8.7–10.5)
CHLORIDE SERPL-SCNC: 103 MMOL/L (ref 95–110)
CO2 SERPL-SCNC: 28 MMOL/L (ref 23–29)
CREAT SERPL-MCNC: 0.8 MG/DL (ref 0.5–1.4)
DIFFERENTIAL METHOD: ABNORMAL
EOSINOPHIL # BLD AUTO: 0.2 K/UL (ref 0–0.5)
EOSINOPHIL NFR BLD: 2.7 % (ref 0–8)
ERYTHROCYTE [DISTWIDTH] IN BLOOD BY AUTOMATED COUNT: 13.4 % (ref 11.5–14.5)
EST. GFR  (AFRICAN AMERICAN): >60 ML/MIN/1.73 M^2
EST. GFR  (NON AFRICAN AMERICAN): >60 ML/MIN/1.73 M^2
GLUCOSE SERPL-MCNC: 103 MG/DL (ref 70–110)
HCT VFR BLD AUTO: 45.7 % (ref 37–48.5)
HGB BLD-MCNC: 14.3 G/DL (ref 12–16)
IMM GRANULOCYTES # BLD AUTO: 0.02 K/UL (ref 0–0.04)
IMM GRANULOCYTES NFR BLD AUTO: 0.4 % (ref 0–0.5)
LYMPHOCYTES # BLD AUTO: 2.2 K/UL (ref 1–4.8)
LYMPHOCYTES NFR BLD: 40.1 % (ref 18–48)
MCH RBC QN AUTO: 29.5 PG (ref 27–31)
MCHC RBC AUTO-ENTMCNC: 31.3 G/DL (ref 32–36)
MCV RBC AUTO: 94 FL (ref 82–98)
MONOCYTES # BLD AUTO: 0.5 K/UL (ref 0.3–1)
MONOCYTES NFR BLD: 8.6 % (ref 4–15)
NEUTROPHILS # BLD AUTO: 2.6 K/UL (ref 1.8–7.7)
NEUTROPHILS NFR BLD: 47.3 % (ref 38–73)
NRBC BLD-RTO: 0 /100 WBC
PLATELET # BLD AUTO: 247 K/UL (ref 150–450)
PMV BLD AUTO: 12.6 FL (ref 9.2–12.9)
POTASSIUM SERPL-SCNC: 5.1 MMOL/L (ref 3.5–5.1)
PROT SERPL-MCNC: 7.3 G/DL (ref 6–8.4)
RBC # BLD AUTO: 4.84 M/UL (ref 4–5.4)
SODIUM SERPL-SCNC: 140 MMOL/L (ref 136–145)
WBC # BLD AUTO: 5.58 K/UL (ref 3.9–12.7)

## 2022-03-17 PROCEDURE — 99999 PR PBB SHADOW E&M-EST. PATIENT-LVL I: CPT | Mod: PBBFAC,,, | Performed by: AUDIOLOGIST-HEARING AID FITTER

## 2022-03-17 PROCEDURE — 99204 PR OFFICE/OUTPT VISIT, NEW, LEVL IV, 45-59 MIN: ICD-10-PCS | Mod: S$GLB,,, | Performed by: OTOLARYNGOLOGY

## 2022-03-17 PROCEDURE — 3079F DIAST BP 80-89 MM HG: CPT | Mod: CPTII,S$GLB,, | Performed by: OTOLARYNGOLOGY

## 2022-03-17 PROCEDURE — 36415 COLL VENOUS BLD VENIPUNCTURE: CPT | Performed by: NURSE PRACTITIONER

## 2022-03-17 PROCEDURE — 99204 OFFICE O/P NEW MOD 45 MIN: CPT | Mod: S$GLB,,, | Performed by: OTOLARYNGOLOGY

## 2022-03-17 PROCEDURE — 85025 COMPLETE CBC W/AUTO DIFF WBC: CPT | Performed by: NURSE PRACTITIONER

## 2022-03-17 PROCEDURE — 99999 PR PBB SHADOW E&M-EST. PATIENT-LVL I: ICD-10-PCS | Mod: PBBFAC,,, | Performed by: AUDIOLOGIST-HEARING AID FITTER

## 2022-03-17 PROCEDURE — 1159F MED LIST DOCD IN RCRD: CPT | Mod: CPTII,S$GLB,, | Performed by: OTOLARYNGOLOGY

## 2022-03-17 PROCEDURE — 80053 COMPREHEN METABOLIC PANEL: CPT | Performed by: NURSE PRACTITIONER

## 2022-03-17 PROCEDURE — 1159F PR MEDICATION LIST DOCUMENTED IN MEDICAL RECORD: ICD-10-PCS | Mod: CPTII,S$GLB,, | Performed by: OTOLARYNGOLOGY

## 2022-03-17 PROCEDURE — 3077F SYST BP >= 140 MM HG: CPT | Mod: CPTII,S$GLB,, | Performed by: OTOLARYNGOLOGY

## 2022-03-17 PROCEDURE — 99999 PR PBB SHADOW E&M-EST. PATIENT-LVL III: CPT | Mod: PBBFAC,,, | Performed by: OTOLARYNGOLOGY

## 2022-03-17 PROCEDURE — 3008F PR BODY MASS INDEX (BMI) DOCUMENTED: ICD-10-PCS | Mod: CPTII,S$GLB,, | Performed by: OTOLARYNGOLOGY

## 2022-03-17 PROCEDURE — 3077F PR MOST RECENT SYSTOLIC BLOOD PRESSURE >= 140 MM HG: ICD-10-PCS | Mod: CPTII,S$GLB,, | Performed by: OTOLARYNGOLOGY

## 2022-03-17 PROCEDURE — 3008F BODY MASS INDEX DOCD: CPT | Mod: CPTII,S$GLB,, | Performed by: OTOLARYNGOLOGY

## 2022-03-17 PROCEDURE — 3079F PR MOST RECENT DIASTOLIC BLOOD PRESSURE 80-89 MM HG: ICD-10-PCS | Mod: CPTII,S$GLB,, | Performed by: OTOLARYNGOLOGY

## 2022-03-17 PROCEDURE — 99999 PR PBB SHADOW E&M-EST. PATIENT-LVL III: ICD-10-PCS | Mod: PBBFAC,,, | Performed by: OTOLARYNGOLOGY

## 2022-03-17 NOTE — PROGRESS NOTES
Referring Provider:    Eliasreffausto Self  No address on file  Subjective:   Patient: Anita Street [id number], :1965   Visit date:3/17/2022 4:42 PM    Chief Complaint:  Dizziness    HPI:  Anita is a 56 y.o. female who I was asked to see in consultation for evaluation of the following issue(s):    Dizziness/Vertigo:  Onset:  2 month(s)  Total number of episodes:  tntc  Duration of individual episodes: seconds  Severity: moderate  Exacerbating factors: any motion  Prior Medications: nothing  Relieving factors:  remaining motionless  Associated signs and symptoms:  none  Quality:   Spinning- No   Light headedness- Yes   Sensation that room is moving but patient is motionless- Yes  Prior history of similar events: No  She finds that she sometimes drifts to the left with walking and when she feels imbalanced, she feels like she may fall to the left.         Her meds, allergies, medical, surgical, social & family histories were reviewed & updated:  -     She has a current medication list which includes the following prescription(s): alprazolam, amlodipine, clobetasol, estradiol, ketoconazole, meloxicam, methocarbamol, methylprednisolone, ondansetron, ondansetron, scopolamine, testosterone, trazodone, and tretinoin.  -     She  has a past medical history of Anxiety, Depression, and GERD (gastroesophageal reflux disease).   -     She does not have any pertinent problems on file.   -     She  has a past surgical history that includes Cholecystectomy; pelvic lap; Colonoscopy (N/A, 2017); and Hysterectomy.  -     She  reports that she has never smoked. She has never used smokeless tobacco. She reports current alcohol use. She reports that she does not use drugs.  -     Her family history includes Anxiety disorder in her mother; Cataracts in her daughter; Depression in her mother; Diabetes in her father and mother; Heart disease in her brother and father; Hyperlipidemia in her brother; Hypertension in her  "brother and father; Prostate cancer in her father; Strabismus in her daughter.  -     She has No Known Allergies.    Objective:     Physical Exam: (abnormal findings indicated in BOLD)    Physical Exam:  Vitals:  BP (!) 145/86   Pulse 76   Ht 5' 9" (1.753 m)   Wt 82.4 kg (181 lb 10.5 oz)   LMP  (LMP Unknown)   BMI 26.83 kg/m²   General appearance:  Well developed, well nourished    Eyes:  Extraocular motions intact, PERRL    Communication:  no hoarseness, no dysphonia    Ears:  Otoscopy of external auditory canals and tympanic membranes was normal, clinical speech reception thresholds grossly intact, no mass/lesion of auricle.  Nose:  No masses/lesions of external nose, nasal mucosa, septum, and turbinates were within normal limits.  Mouth:  No mass/lesion of lips, teeth, gums, hard/soft palate, tongue, tonsils, or oropharynx.    Cardiovascular:  No pedal edema; Radial Pulses +2     Neck & Lymphatics:  No cervical lymphadenopathy, no neck mass/crepitus/ asymmetry, trachea is midline, no thyroid enlargement/tenderness/mass.    Psych: Oriented x3,  Alert with normal mood and affect.     Respiration/Chest:  Symmetric expansion during respiration, normal respiratory effort.    Skin:  Warm and intact. No ulcerations of face, scalp, neck.    CRANIAL NERVES:  III, IV, VI:  Extraocular muscles intact.  Pupils equally reactive to light and accommodation.  V: Facial sensory function to light touch intact and symmetrical.  No evidence of atrophy of the masseter and temporal muscles.  VII:  Facial strength intact and symmetrical.  IX:  Soft palate elevates in the midline.  XI:  Shoulders motility and strength intact and symmetrical. No atrophy of sternocleidomastoid and trapezius muscles.  XII:  Tongue motility and strength intact.  No spontaneous or gaze nystagmus.    Jw-Hallpike test:  No vertigo or nystagmus.    Head-shaking test:  No nystagmus.  Vestibulo-ocular reflex:  No evidence of catch-up saccades to bilateral " head turns.    Oculo-counter torsion:  Intact to bilateral head tilts.  Finger-to-nose testing intact without dysmetria.  Deviation of index.  Eyes closed:  No deviation.  MOTOR STRENGTH:  Strength 5/5 throughout.  SENSORY:  Sensory function to light touch and proprioception intact throughout.         VNG Screen:  Spontaneous test: Absent for nystagmus  Gaze test: Absent for nystagmus  Static Positional test: Downbeating nystagmus noted   Jw-Hallpike Right: Downbeating nystagmus noted   Jw-Hallpike Left: Downbeating nystagmus noted     Assessment & Plan:   Anita was seen today for dizziness.    Diagnoses and all orders for this visit:    Vertigo          DIZZINESS-   Dizziness is an extremely complex problem that may arise from many sources.  I requires the coordination between the visual system, the vestibular system as well as the proprioreceptive system.  Additionally, balance is compromised in the setting of musculoskeletal, cerebral, cardiac, and numerous physiologic disorders.  The complex interplay between these systems may also lead to dizziness if there is dysynchrony between the bilateral vestibular symptoms.    Central vestibular symptoms can generally be distinguished from peripheral vestibulopathies by the presence of other non vestibular neurologic symptoms (focal weakness, headache), light headedness (rather than true vertigo), near syncope, weak limbs, panic, fuzziness/cloudiness in mentation, and clumsiness.  Peripheral vestibulopathies are generally, at some point during their course, characterized by a true vertiginous sensation of movement, difficulty with sudden head movements, nausea, difficulty with sudden head movement, and possibly oscicllopsia.    BPPV is the most common cause of episodic vertigo.  Symptoms generally last for seconds then resolve when motionless, otitic symptoms are absent and may be provoked with sudden head motion.  This may occur spontaneously, but frequently  follow head trauma or recent vestibular neuronitis.  Nystagmus is typically geotropic and directed toward the affected ear (hyperactive input to the inferior vestibular nerve via the Singular nerve). Canalith repositioning maneuvers are the method of choice for treating this condition.  Mild imbalance following is common and may last 1-2 weeks.    Vestibular neuronitis and labyrinthitis can be distinguished by the presence of sensorineural hearing loss in labyrinthitis, but during their active phase, vertigo is constant.   MRI may be necessary during this phase to exclude CNS pathology.  Frequently this is preceded by a viral illness. Both result in permanent partial end organ weakness of the affected vestibular nerve (usually superior).  Otherwise, they are essentially the same.  The early (vertiginous, 1-3 days) phase is characterized by acute onset of vertigo that is essentially constant and present even in the absence of motion.  Nystagmus is directed away from the affected ear (hypoactive).  In the acute phase, steroids, limited use of vestibular suppressants and hydration are the most effective treatment. Patients then enter the second phase of uncompensated vestibulopathy where they have a general sense of imbalance.  The duration of this phase depends on several factors, but is generally delayed in the presence of vestibular suppressants, advanced age, central/systemic balance issues and sessile behavior.   Phase 3 is compensated vestibulopathy where symptoms generally only occur with sudden head movements and can be seen with catch up saccades on head thrust.   However, in the presence of bilateral VN, oscillopsia is the hallmark of the disease and compensation is frequently very delayed and poor.    Other causes of vertigo that are typically constant are vestibulotoxic medications and autoimmune inner ear disease and these are generally bilateral in nature.    Meniere's disease is typically (85%)  unilateral and defined by 2 spontaneous vertigo attacks lasting 20 min or more, documented hearing loss (typically low tone, frequently fluctuating) and otic fullness or tinnitus in the affected ear. However, the presence of endolymphatic hydrops may result in similar symptoms, not meeting these strict criteria.  This disease can be difficult to distinguish from vestibular migraines, which are not always associated with headaches.    Superior canal dehiscence presents with episodic vertigo lasting seconds to minutes, aural fullness, autophony, hyperacusis and tinnitus (mya pulsatile).  Aural pressure is the most common presenting symptom.  Symptoms can be provoked with Valsalva.  Bone conduction thresholds are supranormal.    Perilymph fistula presents with fluctuating hearing loss, episodic vertigo lasting seconds to minutes and frequently is associated with prior barotrauma or otologic surgery.  Conservative measures such as stool softeners and bedrest frequently lead to resolution.  In cases of persistent symptoms, unfortunately there is no noninvasive diagnostic test with high specificity, and surgical exploration is sometimes necessary when this is expected.    Vestibular schwannomas may have constant or episodic vertigo, frequently SNHL and sometimes may be accompanied by headache or facial numbness.    The diagnosis and options of management were discussed at length with the patient, including hearing, balance function and the risks associated with my recommendations. We spent a considerable amount of time discussing strategies to cope with dizziness. I emphasized the great importance of fall avoidance and activities that may be dangerous if Anita has an episode of dizziness.  I answered all questions in layman's terms. Based on the history, physical exam and imaging studies there is moderate evidence of a vestibular dysfunction.  I recommend completion of a full VNG after an appropriate time period of  being off of vestibular suppressants (Xanax).     We discussed the need for fall prevention as well as the need to maintain appropriate levels of activity such that her vestibular system will continue to have appropriate stimulation.         Thank you for allowing me to participate in the care of Anita.         Deni Edouard MD, FACS [facility]   [facility]  [address]  [place] [id number]  P: [phone]  F: [phone]

## 2022-03-17 NOTE — PROGRESS NOTES
Anita Street was seen 03/17/2022 for an audiological evaluation and vestibular screen.     Results reveal normal hearing 250-8000 Hz for the right ear, and normal-to-mild hearing loss at 8000 Hz for the left ear.   Speech Reception Thresholds were  10 dBHL for the right ear and 10 dBHL for the left ear.   Word recognition scores were excellent for the right ear and excellent for the left ear.   Tympanograms were Type A for the right ear and Type A for the left ear. Negative rob at 8000 Hz.     Patient reports that high frequency hearing loss of left ear has been present for several years.     VNG Screen:  Spontaneous test: Absent for nystagmus  Gaze test: Absent for nystagmus  Static Positional test: Downbeating nystagmus noted   Jw-Hallpike Right: Downbeating nystagmus noted   [place]-Hallpike Left: Downbeating nystagmus noted     Dr. Edouard recommends a full VNG.     Recommendations:  1. ENT review

## 2022-03-18 ENCOUNTER — PATIENT MESSAGE (OUTPATIENT)
Dept: INTERNAL MEDICINE | Facility: CLINIC | Age: 57
End: 2022-03-18
Payer: COMMERCIAL

## 2022-03-18 ENCOUNTER — PATIENT MESSAGE (OUTPATIENT)
Dept: AUDIOLOGY | Facility: CLINIC | Age: 57
End: 2022-03-18
Payer: COMMERCIAL

## 2022-03-18 ENCOUNTER — PATIENT OUTREACH (OUTPATIENT)
Dept: ADMINISTRATIVE | Facility: OTHER | Age: 57
End: 2022-03-18
Payer: COMMERCIAL

## 2022-03-24 ENCOUNTER — PATIENT MESSAGE (OUTPATIENT)
Dept: OTOLARYNGOLOGY | Facility: CLINIC | Age: 57
End: 2022-03-24
Payer: COMMERCIAL

## 2022-03-29 ENCOUNTER — PATIENT MESSAGE (OUTPATIENT)
Dept: OTOLARYNGOLOGY | Facility: CLINIC | Age: 57
End: 2022-03-29
Payer: COMMERCIAL

## 2022-03-29 ENCOUNTER — HOSPITAL ENCOUNTER (OUTPATIENT)
Dept: RADIOLOGY | Facility: HOSPITAL | Age: 57
Discharge: HOME | End: 2022-03-29
Attending: OTOLARYNGOLOGY
Payer: COMMERCIAL

## 2022-03-29 DIAGNOSIS — H81.4 VERTIGO OF CENTRAL ORIGIN: ICD-10-CM

## 2022-03-29 PROCEDURE — A9585 GADOBUTROL INJECTION: HCPCS | Mod: PO | Performed by: OTOLARYNGOLOGY

## 2022-03-29 PROCEDURE — 70553 MRI BRAIN STEM W/O & W/DYE: CPT | Mod: TC,PO

## 2022-03-29 PROCEDURE — 70553 MRI BRAIN STEM W/O & W/DYE: CPT | Mod: 26,,, | Performed by: RADIOLOGY

## 2022-03-29 PROCEDURE — 70553 MRI IAC/TEMPORAL BONES W W/O CONTRAST: ICD-10-PCS | Mod: 26,,, | Performed by: RADIOLOGY

## 2022-03-29 PROCEDURE — 25500020 PHARM REV CODE 255: Mod: PO | Performed by: OTOLARYNGOLOGY

## 2022-03-29 RX ORDER — GADOBUTROL 604.72 MG/ML
8 INJECTION INTRAVENOUS
Status: COMPLETED | OUTPATIENT
Start: 2022-03-29 | End: 2022-03-29

## 2022-03-29 RX ADMIN — GADOBUTROL 8 ML: 604.72 INJECTION INTRAVENOUS at 09:03

## 2022-03-31 ENCOUNTER — CLINICAL SUPPORT (OUTPATIENT)
Dept: AUDIOLOGY | Facility: CLINIC | Age: 57
End: 2022-03-31
Payer: COMMERCIAL

## 2022-03-31 DIAGNOSIS — H81.91 PERIPHERAL VESTIBULOPATHY OF RIGHT EAR: Primary | ICD-10-CM

## 2022-03-31 PROCEDURE — 92540 BASIC VESTIBULAR EVALUATION: CPT | Mod: S$GLB,,, | Performed by: AUDIOLOGIST-HEARING AID FITTER

## 2022-03-31 PROCEDURE — 92540 PR VESTIBULAR EVAL NYSTAG FOVL&PERPH STIM OSCIL TRACKING: ICD-10-PCS | Mod: S$GLB,,, | Performed by: AUDIOLOGIST-HEARING AID FITTER

## 2022-03-31 PROCEDURE — 99999 PR PBB SHADOW E&M-EST. PATIENT-LVL I: ICD-10-PCS | Mod: PBBFAC,,, | Performed by: AUDIOLOGIST-HEARING AID FITTER

## 2022-03-31 PROCEDURE — 92537 CALORIC VSTBLR TEST W/REC: CPT | Mod: S$GLB,,, | Performed by: AUDIOLOGIST-HEARING AID FITTER

## 2022-03-31 PROCEDURE — 99999 PR PBB SHADOW E&M-EST. PATIENT-LVL I: CPT | Mod: PBBFAC,,, | Performed by: AUDIOLOGIST-HEARING AID FITTER

## 2022-03-31 PROCEDURE — 92537 PR CALORIC VSTBLR TEST W/REC BITHERMAL: ICD-10-PCS | Mod: S$GLB,,, | Performed by: AUDIOLOGIST-HEARING AID FITTER

## 2022-03-31 NOTE — Clinical Note
VNG revealed a right peripheral abnormality (36% right unilateral weakness). LB and DB nystagmus noted during positional testing which reduced with fixation (likely related to peripheral abnormality). All oculomotor function (central) test results were within normal limits.

## 2022-04-01 ENCOUNTER — TELEPHONE (OUTPATIENT)
Dept: OTOLARYNGOLOGY | Facility: CLINIC | Age: 57
End: 2022-04-01
Payer: COMMERCIAL

## 2022-04-01 NOTE — TELEPHONE ENCOUNTER
----- Message from Ann Siddiqi sent at 4/1/2022  8:22 AM CDT -----  Contact: pt  The pt request a return call, please call after 12pm, no additional info given and can be reached at [phone]///[id number]

## 2022-04-01 NOTE — PROGRESS NOTES
Referring provider: Javan Howard LAVON Street was seen 03/31/2022 for Videonystagmography (VNG) testing.    Dizziness/Vertigo:  Onset:  2 month(s)  Total number of episodes:  tntc  Duration of individual episodes: seconds  Severity: moderate  Exacerbating factors: any motion  Prior Medications: nothing  Relieving factors:  remaining motionless  Associated signs and symptoms:  none  Quality:              Spinning- No              Light headedness- Yes              Sensation that room is moving but patient is motionless- Yes  Prior history of similar events: No  She finds that she sometimes drifts to the left with walking and when she feels imbalanced, she feels like she may fall to the left.     3/17/2022 audiogram revealed normal hearing 250-8000 Hz for the right ear, and normal-to-mild hearing loss at 8000 Hz for the left ear.   Speech Reception Thresholds were  10 dBHL for the right ear and 10 dBHL for the left ear.   Word recognition scores were excellent for the right ear and excellent for the left ear.   Tympanograms were Type A for the right ear and Type A for the left ear. Negative rob at 8000 Hz.     Oculomotor function tests (sinusoidal tracking, saccade and OPKs) were normal and symmetric.  Spontaneous nystagmus was absent.  Gaze nystagmus was absent.  Head-shake test was absent for after head shake nystagmus.  Jw-Hallpike Left was negative for BPPV. Downbeating nystagmus noted.   [place]-Hallpike Right was negative for BPPV. Downbeating nystagmus noted.   Static Positional nystagmus:  Supine - no nystagmus noted   Head right - LB nystagmus that reduced with fixation  Head left - DB nystagmus that reduced with fixation  Side right - slight LB nystagmus that reduced with fixation  Side left - DB nystagmus that reduced with fixation   Bi-thermal caloric irrigations revealed a 36% caloric weakness in the right ear, and 12% directional preponderance to the right, which is within normal limits.  Fixation  suppression following caloric irrigations were normal.  RC: 4    RW: 4  LC: 10    LW: 7    Impressions:   VNG revealed a right peripheral abnormality (36% right unilateral weakness). LB and DB nystagmus noted during positional testing which reduced with fixation (likely related to peripheral abnormality). All oculomotor function (central) test results were within normal limits.     Rec:  1. ENT review and recommendations     Tracings are scanned into computer.

## 2022-04-06 ENCOUNTER — OFFICE VISIT (OUTPATIENT)
Dept: OTOLARYNGOLOGY | Facility: CLINIC | Age: 57
End: 2022-04-06
Payer: COMMERCIAL

## 2022-04-06 VITALS
BODY MASS INDEX: 26.53 KG/M2 | WEIGHT: 179.69 LBS | DIASTOLIC BLOOD PRESSURE: 91 MMHG | HEART RATE: 89 BPM | SYSTOLIC BLOOD PRESSURE: 135 MMHG | TEMPERATURE: 99 F

## 2022-04-06 DIAGNOSIS — H81.91 PERIPHERAL VESTIBULOPATHY OF RIGHT EAR: Primary | ICD-10-CM

## 2022-04-06 DIAGNOSIS — J01.90 ACUTE SINUSITIS, RECURRENCE NOT SPECIFIED, UNSPECIFIED LOCATION: ICD-10-CM

## 2022-04-06 PROCEDURE — 99213 OFFICE O/P EST LOW 20 MIN: CPT | Mod: S$GLB,,, | Performed by: OTOLARYNGOLOGY

## 2022-04-06 PROCEDURE — 3075F SYST BP GE 130 - 139MM HG: CPT | Mod: CPTII,S$GLB,, | Performed by: OTOLARYNGOLOGY

## 2022-04-06 PROCEDURE — 3008F PR BODY MASS INDEX (BMI) DOCUMENTED: ICD-10-PCS | Mod: CPTII,S$GLB,, | Performed by: OTOLARYNGOLOGY

## 2022-04-06 PROCEDURE — 3008F BODY MASS INDEX DOCD: CPT | Mod: CPTII,S$GLB,, | Performed by: OTOLARYNGOLOGY

## 2022-04-06 PROCEDURE — 99999 PR PBB SHADOW E&M-EST. PATIENT-LVL IV: CPT | Mod: PBBFAC,,, | Performed by: OTOLARYNGOLOGY

## 2022-04-06 PROCEDURE — 3080F PR MOST RECENT DIASTOLIC BLOOD PRESSURE >= 90 MM HG: ICD-10-PCS | Mod: CPTII,S$GLB,, | Performed by: OTOLARYNGOLOGY

## 2022-04-06 PROCEDURE — 3080F DIAST BP >= 90 MM HG: CPT | Mod: CPTII,S$GLB,, | Performed by: OTOLARYNGOLOGY

## 2022-04-06 PROCEDURE — 99999 PR PBB SHADOW E&M-EST. PATIENT-LVL IV: ICD-10-PCS | Mod: PBBFAC,,, | Performed by: OTOLARYNGOLOGY

## 2022-04-06 PROCEDURE — 99213 PR OFFICE/OUTPT VISIT, EST, LEVL III, 20-29 MIN: ICD-10-PCS | Mod: S$GLB,,, | Performed by: OTOLARYNGOLOGY

## 2022-04-06 PROCEDURE — 3075F PR MOST RECENT SYSTOLIC BLOOD PRESS GE 130-139MM HG: ICD-10-PCS | Mod: CPTII,S$GLB,, | Performed by: OTOLARYNGOLOGY

## 2022-04-06 RX ORDER — PREDNISONE 20 MG/1
TABLET ORAL
Qty: 20 TABLET | Refills: 0 | Status: SHIPPED | OUTPATIENT
Start: 2022-04-06 | End: 2022-04-19 | Stop reason: SDUPTHER

## 2022-04-06 RX ORDER — AMOXICILLIN AND CLAVULANATE POTASSIUM 875; 125 MG/1; MG/1
1 TABLET, FILM COATED ORAL EVERY 12 HOURS
Qty: 28 TABLET | Refills: 0 | Status: SHIPPED | OUTPATIENT
Start: 2022-04-06 | End: 2022-04-20

## 2022-04-06 NOTE — PROGRESS NOTES
"Referring Provider:    No referring provider defined for this encounter.  Subjective:   Patient: Anita Street [id number], :1965   Visit date:2022 11:01 AM    Chief Complaint: see below  HPI:       Follow-up (Follow up)    Prior notes reviewed  Clinical documentation obtained by nursing staff reviewed.       VNG on 22 was positive for peripheral vestibulopathy of R ear.   Rtc on 22 c/o constant dizziness or off balance "feels like falling back"  New onset of facial pressure, nasal congestion, otalgia, muffled hearing and tinnitus (AS only) x 1 week. No relief w/ medrol.   No discolored drainage.   Taking xyzal and using flonase      Objective:     Physical Exam:  Vitals:  BP (!) 135/91   Pulse 89   Temp 98.5 °F (36.9 °C) (Temporal)   Wt 81.5 kg (179 lb 10.8 oz)   LMP  (LMP Unknown)   BMI 26.53 kg/m²   General appearance:  Well developed, well nourished    Ears:  Otoscopy of external auditory canals and tympanic membranes was normal, clinical speech reception thresholds grossly intact, no mass/lesion of auricle.    Nose:  No masses/lesions of external nose, nasal mucosa, septum, and turbinates were within normal limits.    Mouth:  No mass/lesion of lips, teeth, gums, hard/soft palate, tongue, tonsils, or oropharynx.    Neck & Lymphatics:  No cervical lymphadenopathy, no neck mass/crepitus/ asymmetry, trachea is midline, no thyroid enlargement/tenderness/mass.        Assessment & Plan:   Peripheral vestibulopathy of right ear  -     Ambulatory referral/consult to Physical/Occupational Therapy; Future; Expected date: 2022    Acute sinusitis, recurrence not specified, unspecified location    Other orders  -     amoxicillin-clavulanate 875-125mg (AUGMENTIN) 875-125 mg per tablet; Take 1 tablet by mouth every 12 (twelve) hours. for 14 days  Dispense: 28 tablet; Refill: 0  -     predniSONE (DELTASONE) 20 MG tablet; Take 3 tab in am x 3d, then 2 tab in am x 3d, then 1 tab in am x 3d, then " 1/2 tab in am x 3d  Dispense: 20 tablet; Refill: 0        VRT referral  Meds above for ABRS    Thank you for allowing me to participate in the care of Anita.       Deni Edouard MD, FACS [facility]   [facility]  [address]  [place] [address]  P: [phone]  F: [phone]

## 2022-04-07 ENCOUNTER — OFFICE VISIT (OUTPATIENT)
Dept: OBSTETRICS AND GYNECOLOGY | Facility: CLINIC | Age: 57
End: 2022-04-07
Payer: COMMERCIAL

## 2022-04-07 DIAGNOSIS — Z12.31 ENCOUNTER FOR SCREENING MAMMOGRAM FOR MALIGNANT NEOPLASM OF BREAST: ICD-10-CM

## 2022-04-07 DIAGNOSIS — N95.1 MENOPAUSE SYNDROME: Primary | ICD-10-CM

## 2022-04-07 PROCEDURE — 1160F RVW MEDS BY RX/DR IN RCRD: CPT | Mod: CPTII,95,, | Performed by: OBSTETRICS & GYNECOLOGY

## 2022-04-07 PROCEDURE — 99213 PR OFFICE/OUTPT VISIT, EST, LEVL III, 20-29 MIN: ICD-10-PCS | Mod: 95,,, | Performed by: OBSTETRICS & GYNECOLOGY

## 2022-04-07 PROCEDURE — 99213 OFFICE O/P EST LOW 20 MIN: CPT | Mod: 95,,, | Performed by: OBSTETRICS & GYNECOLOGY

## 2022-04-07 PROCEDURE — 1160F PR REVIEW ALL MEDS BY PRESCRIBER/CLIN PHARMACIST DOCUMENTED: ICD-10-PCS | Mod: CPTII,95,, | Performed by: OBSTETRICS & GYNECOLOGY

## 2022-04-07 PROCEDURE — 1159F PR MEDICATION LIST DOCUMENTED IN MEDICAL RECORD: ICD-10-PCS | Mod: CPTII,95,, | Performed by: OBSTETRICS & GYNECOLOGY

## 2022-04-07 PROCEDURE — 1159F MED LIST DOCD IN RCRD: CPT | Mod: CPTII,95,, | Performed by: OBSTETRICS & GYNECOLOGY

## 2022-04-07 RX ORDER — ESTRADIOL 0.1 MG/G
1 CREAM VAGINAL
Qty: 42.5 G | Refills: 3 | Status: SHIPPED | OUTPATIENT
Start: 2022-04-07 | End: 2022-06-20

## 2022-04-07 RX ORDER — TESTOSTERONE GEL, 1% 10 MG/G
1 GEL TRANSDERMAL DAILY
Qty: 15 G | Refills: 3 | Status: SHIPPED | OUTPATIENT
Start: 2022-04-07 | End: 2022-10-06 | Stop reason: SDUPTHER

## 2022-04-07 RX ORDER — ESTRADIOL 1 MG/1
1 TABLET ORAL DAILY
Qty: 30 TABLET | Refills: 11 | Status: SHIPPED | OUTPATIENT
Start: 2022-04-07 | End: 2022-06-20

## 2022-04-07 NOTE — PROGRESS NOTES
The patient location is: [place]  The chief complaint leading to consultation is: hormone therapy    Visit type: audiovisual    Face to Face time with patient: 15 minutes  20 minutes of total time spent on the encounter, which includes face to face time and non-face to face time preparing to see the patient (eg, review of tests), Obtaining and/or reviewing separately obtained history, Documenting clinical information in the electronic or other health record, Independently interpreting results (not separately reported) and communicating results to the patient/family/caregiver, or Care coordination (not separately reported).         Each patient to whom he or she provides medical services by telemedicine is:  (1) informed of the relationship between the physician and patient and the respective role of any other health care provider with respect to management of the patient; and (2) notified that he or she may decline to receive medical services by telemedicine and may withdraw from such care at any time.    Notes:     Subjective:       Patient ID: Anita Street is a 56 y.o. female.    Chief Complaint:  menopause    History of Present Illness  HPI  Presents to discuss menopause symptoms.  Pt has hx of hysterectomy, but still has both ovaries.  Was seeing Dr. Bran, and has been using Androgel sparingly for low libido.  This does help, but still has problems with sex drive.  She did have bothersome hot flushes, but those have improved (although still gets them from time to time).  Since going through menopause, she has had significant vaginal dryness and oral dryness.  Is seeing ENT for dry mouth, but notes that it seemed to start as she went through menopause.  Pt is interested in trying ERT to see if this would help.  Also wants to refill Rx's for Androgel and vaginal estrace.    GYN & OB History  No LMP recorded (lmp unknown). Patient has had a hysterectomy.   Date of Last Pap: No result found    OB History     Para Term  AB Living   3 2 2   1     SAB IAB Ectopic Multiple Live Births   1              # Outcome Date GA Lbr Ovidio/2nd Weight Sex Delivery Anes PTL Lv   3 SAB            2 Term      Vag-Spont      1 Term      Vag-Spont          Review of Systems  Review of Systems   Constitutional: Negative for fatigue, fever and unexpected weight change.   HENT:        Dry mouth   Gastrointestinal: Negative for abdominal pain, bloating, blood in stool, constipation, diarrhea, nausea and vomiting.   Endocrine: Positive for hot flashes (mild).   Genitourinary: Positive for decreased libido and vaginal dryness. Negative for dyspareunia, dysuria, flank pain, frequency, genital sores, hematuria, pelvic pain, urgency, vaginal bleeding, vaginal discharge, vaginal pain, urinary incontinence, postcoital bleeding, postmenopausal bleeding and vaginal odor.   Integumentary:  Negative for rash, hair changes, breast mass, nipple discharge and breast skin changes.   Psychiatric/Behavioral: Negative for depression. The patient is not nervous/anxious.    Breast: Negative for mass, mastodynia, nipple discharge and skin changes          Objective:    Physical Exam:   Constitutional: She is oriented to person, place, and time. She appears well-developed and well-nourished. No distress.                           Neurological: She is alert and oriented to person, place, and time.     Psychiatric: She has a normal mood and affect. Her behavior is normal. Judgment and thought content normal.          Assessment:        1. Menopause syndrome    2. Encounter for screening mammogram for malignant neoplasm of breast    3. Sexual desire disorder                Plan:      Diagnoses and all orders for this visit:    Menopause syndrome  -     estradioL (ESTRACE) 0.01 % (0.1 mg/gram) vaginal cream; Place 1 gram vaginally twice a week.  -     testosterone (ANDROGEL) 1 % (50 mg/5 gram) GlPk; Apply 1 drop topically once daily.  -     estradioL  (ESTRACE) 1 MG tablet; Take 1 tablet (1 mg total) by mouth once daily.    Encounter for screening mammogram for malignant neoplasm of breast  -     Mammo Digital Screening Bilat w/ Emeka; Future    Sexual desire disorder  -     testosterone (ANDROGEL) 1 % (50 mg/5 gram) GlPk; Apply 1 drop topically once daily.    A full discussion of the benefit-risk ratio of hormonal replacement therapy was carried out. Improvement in vasomotor and other climacteric symptoms is discussed, including possible improvements in sleep and mood. Reduction of risk for osteoporosis was explained. We discussed the study data showing increased risk of thrombo-embolic events such as myocardial infarction, stroke and also possibly breast cancer with estrogen replacement, and how this might affect her. The range of side effects such as breast tenderness, weight gain and including possible increases in lifetime risk of breast cancer and possible thrombotic complications was discussed. We also discussed ACOG's recommendation to use hormone replacement therapy for the relief of hot flashes alone and to be on the lowest dose possible for the shortest amount of time.  Alternative such as herbal and soy-based products were reviewed. All of her questions about this therapy were answered.  Rx's sent as above.    Pt wants to schedule WWE with [facility] in November.  Orders placed.  She will schedule as it gets closer to that time.    Advised her to contact me via [facility] if she feels ERT is not helping.

## 2022-04-16 ENCOUNTER — PATIENT MESSAGE (OUTPATIENT)
Dept: OTOLARYNGOLOGY | Facility: CLINIC | Age: 57
End: 2022-04-16
Payer: COMMERCIAL

## 2022-04-16 ENCOUNTER — HOSPITAL ENCOUNTER (EMERGENCY)
Facility: HOSPITAL | Age: 57
Discharge: HOME | End: 2022-04-16
Attending: EMERGENCY MEDICINE
Payer: COMMERCIAL

## 2022-04-16 VITALS
DIASTOLIC BLOOD PRESSURE: 70 MMHG | TEMPERATURE: 99 F | HEIGHT: 69 IN | RESPIRATION RATE: 18 BRPM | OXYGEN SATURATION: 98 % | WEIGHT: 185.88 LBS | HEART RATE: 82 BPM | SYSTOLIC BLOOD PRESSURE: 139 MMHG | BODY MASS INDEX: 27.53 KG/M2

## 2022-04-16 DIAGNOSIS — R68.2 DRY MOUTH: Primary | ICD-10-CM

## 2022-04-16 DIAGNOSIS — R68.84 JAW PAIN: ICD-10-CM

## 2022-04-16 DIAGNOSIS — D72.820 LYMPHOCYTOSIS: ICD-10-CM

## 2022-04-16 DIAGNOSIS — R59.9 REACTIVE LYMPHADENOPATHY: ICD-10-CM

## 2022-04-16 LAB
ALBUMIN SERPL BCP-MCNC: 4.3 G/DL (ref 3.5–5.2)
ALP SERPL-CCNC: 51 U/L (ref 55–135)
ALT SERPL W/O P-5'-P-CCNC: 18 U/L (ref 10–44)
AMYLASE SERPL-CCNC: 81 U/L (ref 20–110)
ANION GAP SERPL CALC-SCNC: 15 MMOL/L (ref 8–16)
AST SERPL-CCNC: 17 U/L (ref 10–40)
BASOPHILS # BLD AUTO: 0.05 K/UL (ref 0–0.2)
BASOPHILS NFR BLD: 0.4 % (ref 0–1.9)
BILIRUB SERPL-MCNC: 0.5 MG/DL (ref 0.1–1)
BUN SERPL-MCNC: 18 MG/DL (ref 6–20)
CALCIUM SERPL-MCNC: 9.9 MG/DL (ref 8.7–10.5)
CHLORIDE SERPL-SCNC: 98 MMOL/L (ref 95–110)
CO2 SERPL-SCNC: 27 MMOL/L (ref 23–29)
CREAT SERPL-MCNC: 0.9 MG/DL (ref 0.5–1.4)
DIFFERENTIAL METHOD: ABNORMAL
EOSINOPHIL # BLD AUTO: 0.2 K/UL (ref 0–0.5)
EOSINOPHIL NFR BLD: 1.6 % (ref 0–8)
ERYTHROCYTE [DISTWIDTH] IN BLOOD BY AUTOMATED COUNT: 13.2 % (ref 11.5–14.5)
EST. GFR  (AFRICAN AMERICAN): >60 ML/MIN/1.73 M^2
EST. GFR  (NON AFRICAN AMERICAN): >60 ML/MIN/1.73 M^2
GLUCOSE SERPL-MCNC: 107 MG/DL (ref 70–110)
HCT VFR BLD AUTO: 42 % (ref 37–48.5)
HGB BLD-MCNC: 13.8 G/DL (ref 12–16)
IMM GRANULOCYTES # BLD AUTO: 0.09 K/UL (ref 0–0.04)
IMM GRANULOCYTES NFR BLD AUTO: 0.7 % (ref 0–0.5)
LYMPHOCYTES # BLD AUTO: 5.6 K/UL (ref 1–4.8)
LYMPHOCYTES NFR BLD: 46.7 % (ref 18–48)
MCH RBC QN AUTO: 29.5 PG (ref 27–31)
MCHC RBC AUTO-ENTMCNC: 32.9 G/DL (ref 32–36)
MCV RBC AUTO: 90 FL (ref 82–98)
MONOCYTES # BLD AUTO: 0.8 K/UL (ref 0.3–1)
MONOCYTES NFR BLD: 6.7 % (ref 4–15)
NEUTROPHILS # BLD AUTO: 5.3 K/UL (ref 1.8–7.7)
NEUTROPHILS NFR BLD: 43.9 % (ref 38–73)
NRBC BLD-RTO: 0 /100 WBC
PLATELET # BLD AUTO: 269 K/UL (ref 150–450)
PMV BLD AUTO: 11.3 FL (ref 9.2–12.9)
POTASSIUM SERPL-SCNC: 3.7 MMOL/L (ref 3.5–5.1)
PROT SERPL-MCNC: 7.2 G/DL (ref 6–8.4)
RBC # BLD AUTO: 4.68 M/UL (ref 4–5.4)
SODIUM SERPL-SCNC: 140 MMOL/L (ref 136–145)
TSH SERPL DL<=0.005 MIU/L-ACNC: 1.85 UIU/ML (ref 0.4–4)
WBC # BLD AUTO: 12.09 K/UL (ref 3.9–12.7)

## 2022-04-16 PROCEDURE — 80053 COMPREHEN METABOLIC PANEL: CPT | Performed by: EMERGENCY MEDICINE

## 2022-04-16 PROCEDURE — 85025 COMPLETE CBC W/AUTO DIFF WBC: CPT | Performed by: EMERGENCY MEDICINE

## 2022-04-16 PROCEDURE — 99285 EMERGENCY DEPT VISIT HI MDM: CPT | Mod: 25

## 2022-04-16 PROCEDURE — 96360 HYDRATION IV INFUSION INIT: CPT | Mod: 59

## 2022-04-16 PROCEDURE — 87040 BLOOD CULTURE FOR BACTERIA: CPT | Mod: 59 | Performed by: EMERGENCY MEDICINE

## 2022-04-16 PROCEDURE — 25000003 PHARM REV CODE 250: Performed by: EMERGENCY MEDICINE

## 2022-04-16 PROCEDURE — 25500020 PHARM REV CODE 255: Performed by: EMERGENCY MEDICINE

## 2022-04-16 PROCEDURE — 82150 ASSAY OF AMYLASE: CPT | Performed by: EMERGENCY MEDICINE

## 2022-04-16 PROCEDURE — 84443 ASSAY THYROID STIM HORMONE: CPT | Performed by: EMERGENCY MEDICINE

## 2022-04-16 PROCEDURE — 96361 HYDRATE IV INFUSION ADD-ON: CPT

## 2022-04-16 RX ADMIN — SODIUM CHLORIDE 500 ML: 9 INJECTION, SOLUTION INTRAVENOUS at 07:04

## 2022-04-16 RX ADMIN — IOHEXOL 75 ML: 350 INJECTION, SOLUTION INTRAVENOUS at 08:04

## 2022-04-17 NOTE — ED PROVIDER NOTES
Encounter Date: 4/16/2022    SCRIBE #1 NOTE: I, Yves Obrien, am scribing for, and in the presence of,  Garo Pop MD. I have scribed the following portions of the note - Other sections scribed: HPI, ROS, and PEx.   SCRIBE #2 NOTE: I, Dewayne Bradford, am scribing for, and in the presence of,  Carroll Rivas MD . I have scribed the remaining portions of the note not scribed by Scribe #1.     History     Chief Complaint   Patient presents with    Facial Pain     Facial, sinus issues, denies fever, + chills, denies N/V/D     HPI   56-year-old white female presenting complaining of locking mucosal swelling, bilateral jaw pain and chills over the past 24 hours.  The patient has recently been treated with Augmentin for vestibular insufficiency.  After completing the course or symptoms today have worsened over the past 24 hours.  She notes dry intraoral membranes with a strange intraoral taste.  Shows that pain is in the bilateral jaws the mandibular angles.  She denies any stiff fevers.  She has had no recent change in her medications other than the Augmentin.  She is on hydrochlorothiazide.    Review of patient's allergies indicates:  No Known Allergies  Past Medical History:   Diagnosis Date    Anxiety     Depression     GERD (gastroesophageal reflux disease)      Past Surgical History:   Procedure Laterality Date    CHOLECYSTECTOMY      COLONOSCOPY N/A 11/13/2017    Procedure: COLONOSCOPY;  Surgeon: Yina Macias MD;  Location: [facility];  Service: Endoscopy;  Laterality: N/A;    HYSTERECTOMY      severe cervical dysplasia, no cancer, several paps normal since    pelvic lap      Feritility     Family History   Problem Relation Age of Onset    Anxiety disorder Mother     Depression Mother     Diabetes Mother     Diabetes Father     Hypertension Father     Heart disease Father     Prostate cancer Father     Cataracts Daughter     Hyperlipidemia Brother     Hypertension Brother     Heart disease Brother      Strabismus Daughter     Colon cancer Neg Hx     Breast cancer Neg Hx      Social History     Tobacco Use    Smoking status: Never Smoker    Smokeless tobacco: Never Used   Substance Use Topics    Alcohol use: Yes     Comment: Rare Occ    Drug use: No     Review of Systems   Constitutional: Positive for chills. Negative for fever.   HENT: Positive for ear pain, facial swelling and tinnitus. Negative for drooling, ear discharge and sore throat.    Gastrointestinal: Negative for nausea and vomiting.   Musculoskeletal: Positive for arthralgias and myalgias. Negative for neck pain.   Neurological: Negative for dizziness, facial asymmetry and speech difficulty.   All other systems reviewed and are negative.      Physical Exam     Initial Vitals [04/16/22 1905]   BP Pulse Resp Temp SpO2   (!) 194/98 83 18 99 °F (37.2 °C) 99 %      MAP       --         Physical Exam  Nursing Notes and Vital Signs Reviewed.  Constitutional: Patient is in no acute distress. Well-developed and well-nourished.  Head: Atraumatic. Normocephalic.  Eyes:  EOM intact.  No scleral icterus.  ENT: Mucous membranes are moist.  Nares clear .  There is mild right-sided is mildly enlarged versus the left.  There is no abnormalities to the tympanic membranes.  There is no mastoid tenderness.  There is no jaw tenderness.  No lymphadenopathy.  There is no swelling below the jaw line or in the neck.  Neck:  Full ROM. No JVD.  Cardiovascular: Regular rate. Regular rhythm No murmurs, rubs, or gallops. Distal pulses are 2+ and symmetric  Pulmonary/Chest: No respiratory distress. Clear to auscultation bilaterally. No wheezing or rales.  Equal chest wall rise bilaterally  Abdominal: Soft and non-distended.  There is no tenderness.  No rebound, guarding, or rigidity. Good bowel sounds.  Genitourinary: No CVA tenderness.  No suprapubic tenderness  Musculoskeletal: Moves all extremities. No obvious deformities.  5 x 5 strength in all extremities   Skin: Warm  and dry.  No facial erythema or cellulitis.  No rash  Neurological:  Alert, awake, and appropriate.  Normal speech.  No acute focal neurological deficits are appreciated.  Two through 12 intact bilaterally.  Mild fatiguing leftward nystagmus  Psychiatric: Normal affect. Good eye contact. Appropriate in content.    ED Course   Procedures  Labs Reviewed   CBC W/ AUTO DIFFERENTIAL - Abnormal; Notable for the following components:       Result Value    Immature Granulocytes 0.7 (*)     Immature Grans (Abs) 0.09 (*)     Lymph # 5.6 (*)     All other components within normal limits   COMPREHENSIVE METABOLIC PANEL - Abnormal; Notable for the following components:    Alkaline Phosphatase 51 (*)     All other components within normal limits   CULTURE, BLOOD   CULTURE, BLOOD   AMYLASE   TSH          Imaging Results          CT Maxillofacial With Contrast (Final result)  Result time 04/16/22 20:59:58    Final result by Israel Shankar MD (04/16/22 20:59:58)                 Impression:      No evidence for abscess.  Small lymph nodes likely reactive.      Electronically signed by: Raad Wood  Date:    04/16/2022  Time:    20:59             Narrative:    EXAMINATION:  CT MAXILLOFACIAL WITH CONTRAST    CLINICAL HISTORY:  Sublingual/submandibular abscess;    TECHNIQUE:  CT of the maxillofacial with contrast.  Low dose axial images, sagittal and coronal reformations were obtained through the face after the administration of contrast.    COMPARISON:  None    FINDINGS:  Paranasal sinuses are clear.  Vascular structures demonstrate normal flow enhancement.  Soft tissues are otherwise unremarkable.  Globes mastoid air cells are within normal limits.  Submandibular and sublingual and parotid glands are grossly unremarkable.                                 Medications   sodium chloride 0.9% bolus 500 mL (0 mLs Intravenous Stopped 4/16/22 2100)   iohexoL (OMNIPAQUE 350) injection 75 mL (75 mLs Intravenous Given 4/16/22 2039)       ED  discussion:    8:00 PM: Dr. Pop transfers care of patient to Dr. Rivas pending lab and radiology results.    9:30 PM: Reassessed pt at this time. Patient feeling good at this time.  Discussed with pt all pertinent ED information and results. Discussed pt dx and plan of tx. Gave pt all f/u and return to the ED instructions. All questions and concerns were addressed at this time. Pt expresses understanding of information and instructions, and is comfortable with plan to discharge. Pt is stable for discharge.    I discussed with patient and/or family/caretaker that evaluation in the ED does not suggest any emergent or life threatening medical conditions requiring immediate intervention beyond what was provided in the ED, and I believe patient is safe for discharge.  Regardless, an unremarkable evaluation in the ED does not preclude the development or presence of a serious of life threatening condition. As such, patient was instructed to return immediately for any worsening or change in current symptoms.        Medical Decision Making:   Clinical Tests:   Lab Tests: Ordered and Reviewed  Radiological Study: Ordered and Reviewed          Scribe Attestation:   Scribe #1: I performed the above scribed service and the documentation accurately describes the services I performed. I attest to the accuracy of the note.  Scribe #2: I performed the above scribed service and the documentation accurately describes the services I performed. I attest to the accuracy of the note.    Attending Attestation:           Physician Attestation for Scribe:  Physician Attestation Statement for Scribe #1: I, Garo Pop MD, reviewed documentation, as scribed by Yves Obrien in my presence, and it is both accurate and complete.   Physician Attestation Statement for Scribe #2: I, Carroll Rivas MD, reviewed documentation, as scribed by Dewayne Bradford in my presence, and it is both accurate and complete. I also acknowledge and confirm the  content of the note done by Scribe #1.                   Clinical Impression:   Final diagnoses:  [R68.2] Dry mouth (Primary)  [R68.84] Jaw pain  [D72.820] Lymphocytosis  [R59.9] Reactive lymphadenopathy          ED Disposition Condition    Discharge Stable        ED Prescriptions     None        Follow-up Information     Follow up With Specialties Details Why Contact Info    Dayton Malave MD Family Medicine Schedule an appointment as soon as possible for a visit in 1 week For re-evaluation and further treatment [address] [facility] [place] [address] [phone]      Deni Edouard MD Otolaryngology Schedule an appointment as soon as possible for a visit in 1 week For re-evaluation and further treatment [address]  [place] [address] [phone]      [facility] Dept. Emergency Medicine Go today If symptoms worsen, For re-evaluation and further treatment, As needed [address] [facility] [address] [phone]           Carroll Rivas MD  04/17/22 0737

## 2022-04-19 ENCOUNTER — CLINICAL SUPPORT (OUTPATIENT)
Dept: AUDIOLOGY | Facility: CLINIC | Age: 57
End: 2022-04-19
Payer: COMMERCIAL

## 2022-04-19 ENCOUNTER — OFFICE VISIT (OUTPATIENT)
Dept: OTOLARYNGOLOGY | Facility: CLINIC | Age: 57
End: 2022-04-19
Payer: COMMERCIAL

## 2022-04-19 VITALS
DIASTOLIC BLOOD PRESSURE: 82 MMHG | SYSTOLIC BLOOD PRESSURE: 150 MMHG | WEIGHT: 182.31 LBS | HEART RATE: 73 BPM | HEIGHT: 69 IN | BODY MASS INDEX: 27 KG/M2 | TEMPERATURE: 98 F

## 2022-04-19 DIAGNOSIS — H69.92 DYSFUNCTION OF LEFT EUSTACHIAN TUBE: ICD-10-CM

## 2022-04-19 DIAGNOSIS — H90.42 SENSORINEURAL HEARING LOSS (SNHL) OF LEFT EAR WITH UNRESTRICTED HEARING OF RIGHT EAR: Primary | ICD-10-CM

## 2022-04-19 DIAGNOSIS — H69.92 ETD (EUSTACHIAN TUBE DYSFUNCTION), LEFT: ICD-10-CM

## 2022-04-19 DIAGNOSIS — H81.91 PERIPHERAL VESTIBULOPATHY OF RIGHT EAR: Primary | ICD-10-CM

## 2022-04-19 DIAGNOSIS — H81.91 PERIPHERAL VESTIBULOPATHY, RIGHT: ICD-10-CM

## 2022-04-19 DIAGNOSIS — G44.009 CLUSTER HEADACHE, NOT INTRACTABLE, UNSPECIFIED CHRONICITY PATTERN: ICD-10-CM

## 2022-04-19 DIAGNOSIS — H93.12 TINNITUS, LEFT: ICD-10-CM

## 2022-04-19 DIAGNOSIS — H65.92 LEFT OTITIS MEDIA WITH EFFUSION: ICD-10-CM

## 2022-04-19 PROCEDURE — 92567 PR TYMPA2METRY: ICD-10-PCS | Mod: S$GLB,,, | Performed by: AUDIOLOGIST-HEARING AID FITTER

## 2022-04-19 PROCEDURE — 3008F BODY MASS INDEX DOCD: CPT | Mod: CPTII,S$GLB,, | Performed by: STUDENT IN AN ORGANIZED HEALTH CARE EDUCATION/TRAINING PROGRAM

## 2022-04-19 PROCEDURE — 92553 AUDIOMETRY AIR & BONE: CPT | Mod: S$GLB,,, | Performed by: AUDIOLOGIST-HEARING AID FITTER

## 2022-04-19 PROCEDURE — 3079F DIAST BP 80-89 MM HG: CPT | Mod: CPTII,S$GLB,, | Performed by: STUDENT IN AN ORGANIZED HEALTH CARE EDUCATION/TRAINING PROGRAM

## 2022-04-19 PROCEDURE — 3077F PR MOST RECENT SYSTOLIC BLOOD PRESSURE >= 140 MM HG: ICD-10-PCS | Mod: CPTII,S$GLB,, | Performed by: STUDENT IN AN ORGANIZED HEALTH CARE EDUCATION/TRAINING PROGRAM

## 2022-04-19 PROCEDURE — 99214 OFFICE O/P EST MOD 30 MIN: CPT | Mod: S$GLB,,, | Performed by: STUDENT IN AN ORGANIZED HEALTH CARE EDUCATION/TRAINING PROGRAM

## 2022-04-19 PROCEDURE — 99999 PR PBB SHADOW E&M-EST. PATIENT-LVL I: ICD-10-PCS | Mod: PBBFAC,,, | Performed by: AUDIOLOGIST-HEARING AID FITTER

## 2022-04-19 PROCEDURE — 92553 PR AUDIOMETRY, AIR & BONE: ICD-10-PCS | Mod: S$GLB,,, | Performed by: AUDIOLOGIST-HEARING AID FITTER

## 2022-04-19 PROCEDURE — 3079F PR MOST RECENT DIASTOLIC BLOOD PRESSURE 80-89 MM HG: ICD-10-PCS | Mod: CPTII,S$GLB,, | Performed by: STUDENT IN AN ORGANIZED HEALTH CARE EDUCATION/TRAINING PROGRAM

## 2022-04-19 PROCEDURE — 99999 PR PBB SHADOW E&M-EST. PATIENT-LVL I: CPT | Mod: PBBFAC,,, | Performed by: AUDIOLOGIST-HEARING AID FITTER

## 2022-04-19 PROCEDURE — 3008F PR BODY MASS INDEX (BMI) DOCUMENTED: ICD-10-PCS | Mod: CPTII,S$GLB,, | Performed by: STUDENT IN AN ORGANIZED HEALTH CARE EDUCATION/TRAINING PROGRAM

## 2022-04-19 PROCEDURE — 1159F MED LIST DOCD IN RCRD: CPT | Mod: CPTII,S$GLB,, | Performed by: STUDENT IN AN ORGANIZED HEALTH CARE EDUCATION/TRAINING PROGRAM

## 2022-04-19 PROCEDURE — 99999 PR PBB SHADOW E&M-EST. PATIENT-LVL IV: ICD-10-PCS | Mod: PBBFAC,,, | Performed by: STUDENT IN AN ORGANIZED HEALTH CARE EDUCATION/TRAINING PROGRAM

## 2022-04-19 PROCEDURE — 92567 TYMPANOMETRY: CPT | Mod: S$GLB,,, | Performed by: AUDIOLOGIST-HEARING AID FITTER

## 2022-04-19 PROCEDURE — 1159F PR MEDICATION LIST DOCUMENTED IN MEDICAL RECORD: ICD-10-PCS | Mod: CPTII,S$GLB,, | Performed by: STUDENT IN AN ORGANIZED HEALTH CARE EDUCATION/TRAINING PROGRAM

## 2022-04-19 PROCEDURE — 3077F SYST BP >= 140 MM HG: CPT | Mod: CPTII,S$GLB,, | Performed by: STUDENT IN AN ORGANIZED HEALTH CARE EDUCATION/TRAINING PROGRAM

## 2022-04-19 PROCEDURE — 99214 PR OFFICE/OUTPT VISIT, EST, LEVL IV, 30-39 MIN: ICD-10-PCS | Mod: S$GLB,,, | Performed by: STUDENT IN AN ORGANIZED HEALTH CARE EDUCATION/TRAINING PROGRAM

## 2022-04-19 PROCEDURE — 99999 PR PBB SHADOW E&M-EST. PATIENT-LVL IV: CPT | Mod: PBBFAC,,, | Performed by: STUDENT IN AN ORGANIZED HEALTH CARE EDUCATION/TRAINING PROGRAM

## 2022-04-19 RX ORDER — PREDNISONE 20 MG/1
TABLET ORAL
Qty: 20 TABLET | Refills: 0 | Status: SHIPPED | OUTPATIENT
Start: 2022-04-19 | End: 2022-06-20

## 2022-04-19 NOTE — PROGRESS NOTES
Referring provider: Dr. Rodolfo Street was seen 04/19/2022 for an audiological evaluation.  Patient complains of persistent fullness and high-pitched tinnitus in her left ear, with onset one month ago. She also continues with dizziness and wooziness that started in September. VNG on 3/31 indicated a right caloric weakness. Previous audiogram on 3/17 indicated decreased hearing for the left ear at least at 8000 Hz; had a normal MRI IAC 3/18. She suspects left hearing may have worsened. Has been using nasal spray. Has not started VRT.     Results reveal normal hearing 125-8000 Hz for the right ear, and normal hearing 125-6000 Hz sloping to a moderate hearing loss at 8000 Hz for the left ear.  Tympanograms were Type A for the right ear and Type C for the left ear.    No change in audiometric thresholds since her previous audiogram from 03/18/22.  Patient was counseled on the above findings.    Recommendations:  1. ENT at completion of this visit.   2. Consider Electrocochleography if symptoms persist after medical intervention.

## 2022-04-19 NOTE — PROGRESS NOTES
"Referring Provider:    No referring provider defined for this encounter.  Subjective:   Patient: Anita Street [id number], :1965   Visit date:2022 11:01 AM    Chief Complaint: see below  HPI:       Ear Fullness    Prior notes reviewed  Clinical documentation obtained by nursing staff reviewed.       VNG on 22 was positive for peripheral vestibulopathy of R ear.   Rtc on 22 c/o constant dizziness or off balance "feels like falling back"  New onset of facial pressure, nasal congestion, otalgia, muffled hearing and tinnitus (AS only) x 1 week. No relief w/ medrol.   No discolored drainage.   Taking xyzal and using flonase    Return to clinic 22  Recommended VT and augmentin/prednisone for acute sinus infection.     After completed treatment, she developed jaw stiffness and dry oral mucosa on . Went to ED.     One month ago developed left sided facial pressure/pain, ear popping and pressure, muffled hearing, tinnitus. Also with watery eyes and runny nose, also on the left.  These episodes will happen for hours at a time, but then improve.     Denies nasal congestion, purulent rhinorrhea.     Currently using flonase. Not using antihistamine now due to dry mouth.       Objective:     Physical Exam:  Vitals:  BP (!) 150/82   Pulse 73   Temp 97.7 °F (36.5 °C) (Temporal)   Ht 5' 9" (1.753 m)   Wt 82.7 kg (182 lb 5.1 oz)   LMP  (LMP Unknown)   BMI 26.92 kg/m²   General appearance:  Well developed, well nourished    Ears:  Otoscopy of external auditory canals and tympanic membranes was normal, clinical speech reception thresholds grossly intact, no mass/lesion of auricle.    Nose:  No masses/lesions of external nose, nasal mucosa, septum, and turbinates were within normal limits.    Mouth:  No mass/lesion of lips, teeth, gums, hard/soft palate, tongue, tonsils, or oropharynx.    Neck & Lymphatics:  No cervical lymphadenopathy, no neck mass/crepitus/ asymmetry, trachea is midline, no " thyroid enlargement/tenderness/mass.      CT face, 4/16/22 independently reviewed   Sinuses clear  Very sparse opacification in left mastoid, clear middle ear, no coalescence    Audio independently  reviewed          Assessment & Plan:   Peripheral vestibulopathy of right ear    Cluster headache, not intractable, unspecified chronicity pattern    Dysfunction of left eustachian tube    Left otitis media with effusion    Other orders  -     predniSONE (DELTASONE) 20 MG tablet; Take 3 tablets in the morning x 3 days, then 2 tablets in the morning x 3 days, then 1 tablet in the morning x 3 days, then 1/2 tablet in the morning x 3 days  Dispense: 20 tablet; Refill: 0        VRT referral  Meds above for ABRS      Update 4/19/22      Eustachian Tube Dysfunction, mild effusion noted noted CT, TM clear today  Continue flonase   Steroid taper    Unilateral facial pressure/pain, watery eyes, worse with pressure changes, clear sinuses  Suspect cluster type headache  Neurology eval if no improvement with OTC ibuprofen/tylenol      Peripheral vestibulopathy   Has not started VT, will start soon

## 2022-04-22 LAB
BACTERIA BLD CULT: NORMAL
BACTERIA BLD CULT: NORMAL

## 2022-05-02 ENCOUNTER — PATIENT MESSAGE (OUTPATIENT)
Dept: OTOLARYNGOLOGY | Facility: CLINIC | Age: 57
End: 2022-05-02
Payer: COMMERCIAL

## 2022-05-05 ENCOUNTER — CLINICAL SUPPORT (OUTPATIENT)
Dept: REHABILITATION | Facility: HOSPITAL | Age: 57
End: 2022-05-05
Attending: OTOLARYNGOLOGY
Payer: COMMERCIAL

## 2022-05-05 DIAGNOSIS — H81.91 PERIPHERAL VESTIBULOPATHY OF RIGHT EAR: ICD-10-CM

## 2022-05-05 DIAGNOSIS — R42 DIZZINESS: Primary | ICD-10-CM

## 2022-05-05 DIAGNOSIS — R26.9 ABNORMALITY OF GAIT AND MOBILITY: ICD-10-CM

## 2022-05-05 PROCEDURE — 97162 PT EVAL MOD COMPLEX 30 MIN: CPT | Performed by: PHYSICAL THERAPIST

## 2022-05-05 PROCEDURE — 97110 THERAPEUTIC EXERCISES: CPT | Performed by: PHYSICAL THERAPIST

## 2022-05-05 NOTE — PATIENT INSTRUCTIONS
VOR x 1 Horizontal:    Hold a card with a letter on it in front of you. Focus on the letter. Turn your head left and right, keeping your eyes focused on the letter. Attempt to turn your head left/right as quickly as possible WHILE keeping the letter in focus. If the letter becomes blurry, slow down    Goal is to perform for 20-30 seconds: (however, start per tolerance)  Perform at least once per day        VOR x 1 Vertical:    Hold a card with a letter on it in front of you. Focus on the letter. Tilt your head up and down, keeping your eyes focused on the letter. Attempt to tilt your head up/down as quickly as possible WHILE keeping the letter in focus. If the letter becomes blurry, slow down    Goal is to perform for 20-30 seconds: (however, start per tolerance)  Perform at least once per day        SACCADES    Purpose of Activity: This activity will help you to tolerate quick eye movements such as those needed for reading.     Sit in a comfortable position, hold a playing cared (deyvi or queen) in each hand at about eye level and at a comfortable distance.     Keep your head still, move your eyes quickly from one card to the other without stopping in between cards. Do not go so quickly that you blur the targets. Remember to only move your eyes and not move your head.    At first, use a larger target. As you improve, try to focus on progressively smaller details of the face card such as the nose, eye, or mouth of each card. As you improve, try to move your eyes more quickly.    Repetitions:     Repeat 30 to 40 times in the horizontal direction or time yourself for approximately 20-30 seconds.   Repeat 30 to 40 times in the vertical direction or time yourself for approximately 20-30 seconds.  Repeat 30 to 40 times in both diagonal directions (right/up to left/down; right/down to left/up) or time yourself for approximately 20-30 seconds.     Perform at least once per day. Start per your tolerance and work your  way up to 20-30 seconds.                     Visual Smooth Pursuit: tracking thumb    Follow a slow moving target such as your thumbnail with your eyes. Keep your head and body still, and move your thumb side to side, following with your eyes, and keeping your head still. You can use your thumb as the target, or have someone else move a target back and forth for you.    Goal is to perform for 20-30 seconds: (however, start per tolerance)    1)moving the target horizontally    2) vertically    3) diagonally each way    If you feel dizziness, rest until symptoms resolve, and repeat. work up to tolerance

## 2022-05-05 NOTE — PLAN OF CARE
"[facility]  Physical Therapy Initial Evaluation    Name: Anita Street  [facility] [place] Number: [id number]    Therapy Diagnosis:    Encounter Diagnoses   Name Primary?    Dizziness Yes    Peripheral vestibulopathy of right ear     Abnormality of gait and mobility       Physician: Deni Edouard MD     Physician Orders: PT Eval and Treat  Medical Diagnosis from Referral: peripheral vestibulopathy of right ear  Evaluation Date: 5/5/2022  Authorization Period Expiration: 4/6/2023  Plan of Care Expiration: 8/3//2022  Visit # / Visits authorized: 1/1  FOTO: 1/3    Progress Note Due on 6/4/2022    Precautions: Standard    Time In: 0705  Time Out: 0746  Total Billable Time (timed & untimed codes): 40 minutes    Subjective     Date of onset: September 2021  History of current condition - Anita is a 56 y.o. female whom reports that she went on a cruise in September of 2021. When she got back she was feeling "whoozie". She notices that since then she tends to veer towards the left when walking. Patient also feels off balance when she stands up as if she is falling backwards. She never experiences spinning sensation, just off balance and whoozie. She does not feel sure footed when she walks. Patient also feels the off sensation when she is a passenger in the car; however, she is okay driving. Patient reports that some moments are worse than others, but her symptoms are pretty constant. She is okay sitting, but notices feeling off when standing and sometimes when lying down. Patient reports that within the past month and a half she has also had a lot of inner ear issues (on the left side). Patient had an MRI of the brain and inner ear, and she reports that was all unremarkable.   History of migraines: none        Pain:  N/A    Imaging: MRI performed on 3/29/2021    Falls:  None    Exercise Regimen: works out at gym and with     Prior Therapy: Yes - different body part  Social History: Pt lives with " their family  Occupation: Pt is nurse practitioner at [employer]   Prior Level of Function: Independent and dizziness free with all ADL, IADL, community mobility and functional activities.   Current Level of Function: Independent with all ADL, IADL, community mobility and functional activities with reports of increased dizziness and need for increased time and frequent breaks.      Dominant Extremity: right    Pts goals: Pt reported goals are to decrease overall dizziness in order to return to prior functional level.       Medical History:   Past Medical History:   Diagnosis Date    Anxiety     Depression     GERD (gastroesophageal reflux disease)        Surgical History:   Anita Street  has a past surgical history that includes Cholecystectomy; pelvic lap; Colonoscopy (N/A, 11/13/2017); and Hysterectomy.    Medications:   Anita has a current medication list which includes the following prescription(s): alprazolam, amlodipine, clobetasol, estradiol, estradiol, ketoconazole, meloxicam, methocarbamol, ondansetron, ondansetron, prednisone, scopolamine, testosterone, trazodone, and tretinoin.    Allergies:   Review of patient's allergies indicates:  No Known Allergies       Objective   - Follows commands: 100% of time   - Speech: no deficits       Mental status: alert, oriented to person, place, and time, normal mood, behavior, speech, dress, motor activity, and thought processes  Appearance: Well groomed  Behavior:  calm, cooperative and adequate rapport can be established  Attention Span and Concentration:  Normal      Posture: Pt presents with postural abnormalities which include: forward head and rounded shoulders            Visual/Auditory:   Tracking/Smooth Pursuits:Impaired: minimal nystagmus noted each direction but no increase in s/s  Gaze Stabilization: minimal nystagmus noted each direction but no increase in s/s  Head Shake: Negative  Head Thrust: Negative  Saccades: Impaired: minimal nystagmus each  direction but no s/s  Convergence: WNL  VOR: Impaired: very dizzy/off,       Coordination:   - fine motor: WNL  - UE coordination: WNL    - LE coordination:  WNL      POSITIONAL CANAL TESTING  Looking for nystagmus (slow drift to affected side with quick correction away)    [place] (posterior / CL anterior)   Right : NT   Left: NT  Horizontal Canals   Right: NT   Left: NT        MUSCLE LENGTH:     Muscle Tested  Right  5/5/2022 Left   5/5/2022 Goal   Upper Trapezius  decreased decreased Normal B    Levator Scapulae  decreased decreased Normal B   Sternocleidomastoid decreased decreased Normal B   Scalenes  decreased decreased Normal B    Pectoralis Minor  decreased decreased Normal B       Gait Analysis: The patient ambulated with the following assistive device: none; the pt presents with the following gait abnormalities: increased EMEKA, decreased step length bilateral and slight veering to the left    Movement Analysis: slower, guarded movements when standing     Balance:   To be assessed next visit    Function:    CMS Impairment/Limitation/Restriction for FOTO Vestibular Survey    Therapist reviewed FOTO scores for Anita Street on 5/5/2022.   FOTO documents entered into EPIC - see Media section.    Limitation Score: 44%         OBJECTIVE / TREATMENT   Total Treatment time separate from Evaluation: (8) minutes        Anita received therapeutic exercises to develop strength, endurance, ROM, flexibility, posture, core stabilization, Smooth Pursuit, Saccades, and VOR for (8) minutes including:    Intervention Performed Today    HEP established  x See Patient Instructions for details                                        Plan for Next Visit:          Home Exercises and Patient Education Provided    Education/Self-Care provided:   Patient educated on the impairments noted above and the effects of physical therapy intervention to improve overall condition and QOL.    Patient was educated on all the above  exercise prior/during/after for proper posture, positioning, and execution for safe performance with home exercise program.    Patient educated on the importance of improved core and upper and lower extremity strength in order to improve alignment of the spine and upper and lower extremities with static positions and dynamic movement.    Patient educated on the importance of strong core and lower extremity musculature in order to improve both static and dynamic balance, improve gait mechanics, reduce fall risk and improve household and community mobility.       Written Home Exercises Provided: yes.  Exercises were reviewed and Anita was able to demonstrate them prior to the end of the session.  Anita demonstrated good  understanding of the education provided.     See EMR under Patient Instructions for exercises provided 5/5/2022.      Assessment     Anita is a 56 y.o. female referred to outpatient Physical Therapy with a medical diagnosis of peripheral vestibulopathy of right ear. Pt presents with impairments including: decreased muscle length, impaired balance, postural abnormalities, gait abnormalities and decreased overall function.    Pt prognosis is Good.   Pt will benefit from skilled outpatient Physical Therapy to address the deficits stated above and in the chart below, provide pt/family education, and to maximize pt's level of independence.     Plan of care discussed with patient: Yes  Pt's spiritual, cultural and educational needs considered and patient is agreeable to the plan of care and goals as stated below:     Anticipated Barriers for therapy: co-morbidities, chronicity of condition and adherence to treatment plan    Medical Necessity is demonstrated by the following  History  Co-morbidities and personal factors that may impact the plan of care Co-morbidities:   anxiety and depression    Personal Factors:   no deficits     moderate   Examination  Body Structures and Functions, activity  "limitations and participation restrictions that may impact the plan of care Body Regions:   head    Body Systems:    balance  gait  transfers  transitions  motor control  motor learning    Participation Restrictions:   See above in "Current Level of Function"     Activity limitations:   Learning and applying knowledge  no deficits    General Tasks and Commands  no deficits    Communication  no deficits    Mobility  walking    Self care  washing oneself (bathing, drying, washing hands)  caring for body parts (brushing teeth, shaving, grooming)  dressing    Domestic Life  shopping  cooking  doing house work (cleaning house, washing dishes, laundry)    Interactions/Relationships  no deficits    Life Areas  no deficits    Community and Social Life  community life  recreation and leisure         moderate   Clinical Presentation evolving clinical presentation with changing clinical characteristics moderate   Decision Making/ Complexity Score: moderate         GOALS:    Short Term Goals:  6 weeks Progress   1. Dizziness: Patient will report decreased dizziness, and recent s/s trend is improving in order to progress towards LTG's. PC   2. Function: Patient will demonstrate improved function as indicated by a functional status score of less than or equal to 41 out of 100 on FOTO. PC   3. Gait: Patient will demonstrate improved gait mechanics in order to improve functional mobility, improve quality of life, and decrease risk of further injury or fall.  PC   4. HEP: Patient will demonstrate independence with HEP in order to progress toward functional independence. PC   5. Improve postural awareness.  PC       Long Term Goals:  12 weeks Progress   1. Dizziness:  Patient will return to normal ADL, recreational, and work related activities with less dizziness and limitation.  PC   2. Function: Patient will demonstrate improved function as indicated by a functional status score of less than or equal to 38 out of 100 on FOTO. PC "   3. Gait: Patient will demonstrate normalized gait mechanics with minimal compensation in order to return to PLOF. PC   4. Decrease saccades as well as positional and VOR related s/s in order for patient to be able to switch positions or look quickly to different directions without onset of dizziness.  PC     Goals Key:  PC= progressing/continue; PM= partially met;        DC= discontinue    Plan     Plan of care Certification: 5/5/2022 to 8/3/2022.    Outpatient Physical Therapy 1 times weekly for 12 weeks to include any combination of the following interventions: vestibular habituation exercises, dry needling, modalities, electrical stimulation (IFC, Pre-Mod, Attended with Functional Dry Needling), Cervical/Lumbar Traction, Gait Training, Manual Therapy, Neuromuscular Re-ed, Patient Education, Self Care, Therapeutic Exercise and Therapeutic Activites     Thank you for this referral.    These services are reasonable and necessary for the conditions set forth above while under my care.    Lorna Cabezas, PT, DPT

## 2022-05-06 ENCOUNTER — PATIENT OUTREACH (OUTPATIENT)
Dept: ADMINISTRATIVE | Facility: OTHER | Age: 57
End: 2022-05-06
Payer: COMMERCIAL

## 2022-05-06 ENCOUNTER — OFFICE VISIT (OUTPATIENT)
Dept: OTOLARYNGOLOGY | Facility: CLINIC | Age: 57
End: 2022-05-06
Payer: COMMERCIAL

## 2022-05-06 VITALS — HEART RATE: 70 BPM | SYSTOLIC BLOOD PRESSURE: 149 MMHG | DIASTOLIC BLOOD PRESSURE: 83 MMHG | TEMPERATURE: 98 F

## 2022-05-06 DIAGNOSIS — H65.92 LEFT OTITIS MEDIA WITH EFFUSION: ICD-10-CM

## 2022-05-06 DIAGNOSIS — H69.92 DYSFUNCTION OF LEFT EUSTACHIAN TUBE: Primary | ICD-10-CM

## 2022-05-06 PROCEDURE — 99999 PR PBB SHADOW E&M-EST. PATIENT-LVL III: ICD-10-PCS | Mod: PBBFAC,,, | Performed by: STUDENT IN AN ORGANIZED HEALTH CARE EDUCATION/TRAINING PROGRAM

## 2022-05-06 PROCEDURE — 3079F PR MOST RECENT DIASTOLIC BLOOD PRESSURE 80-89 MM HG: ICD-10-PCS | Mod: CPTII,S$GLB,, | Performed by: STUDENT IN AN ORGANIZED HEALTH CARE EDUCATION/TRAINING PROGRAM

## 2022-05-06 PROCEDURE — 3077F PR MOST RECENT SYSTOLIC BLOOD PRESSURE >= 140 MM HG: ICD-10-PCS | Mod: CPTII,S$GLB,, | Performed by: STUDENT IN AN ORGANIZED HEALTH CARE EDUCATION/TRAINING PROGRAM

## 2022-05-06 PROCEDURE — 1159F MED LIST DOCD IN RCRD: CPT | Mod: CPTII,S$GLB,, | Performed by: STUDENT IN AN ORGANIZED HEALTH CARE EDUCATION/TRAINING PROGRAM

## 2022-05-06 PROCEDURE — 69433 CREATE EARDRUM OPENING: CPT | Mod: LT,S$GLB,, | Performed by: STUDENT IN AN ORGANIZED HEALTH CARE EDUCATION/TRAINING PROGRAM

## 2022-05-06 PROCEDURE — 1159F PR MEDICATION LIST DOCUMENTED IN MEDICAL RECORD: ICD-10-PCS | Mod: CPTII,S$GLB,, | Performed by: STUDENT IN AN ORGANIZED HEALTH CARE EDUCATION/TRAINING PROGRAM

## 2022-05-06 PROCEDURE — 99213 PR OFFICE/OUTPT VISIT, EST, LEVL III, 20-29 MIN: ICD-10-PCS | Mod: 25,S$GLB,, | Performed by: STUDENT IN AN ORGANIZED HEALTH CARE EDUCATION/TRAINING PROGRAM

## 2022-05-06 PROCEDURE — 99213 OFFICE O/P EST LOW 20 MIN: CPT | Mod: 25,S$GLB,, | Performed by: STUDENT IN AN ORGANIZED HEALTH CARE EDUCATION/TRAINING PROGRAM

## 2022-05-06 PROCEDURE — 69433 PR CREATE EARDRUM OPENING,LOCAL ANESTH: ICD-10-PCS | Mod: LT,S$GLB,, | Performed by: STUDENT IN AN ORGANIZED HEALTH CARE EDUCATION/TRAINING PROGRAM

## 2022-05-06 PROCEDURE — 3079F DIAST BP 80-89 MM HG: CPT | Mod: CPTII,S$GLB,, | Performed by: STUDENT IN AN ORGANIZED HEALTH CARE EDUCATION/TRAINING PROGRAM

## 2022-05-06 PROCEDURE — 3077F SYST BP >= 140 MM HG: CPT | Mod: CPTII,S$GLB,, | Performed by: STUDENT IN AN ORGANIZED HEALTH CARE EDUCATION/TRAINING PROGRAM

## 2022-05-06 PROCEDURE — 99999 PR PBB SHADOW E&M-EST. PATIENT-LVL III: CPT | Mod: PBBFAC,,, | Performed by: STUDENT IN AN ORGANIZED HEALTH CARE EDUCATION/TRAINING PROGRAM

## 2022-05-06 RX ORDER — CIPROFLOXACIN HYDROCHLORIDE 3 MG/ML
4 SOLUTION/ DROPS OPHTHALMIC 2 TIMES DAILY
Qty: 10 ML | Refills: 0 | Status: SHIPPED | OUTPATIENT
Start: 2022-05-06 | End: 2022-05-06

## 2022-05-06 RX ORDER — CIPROFLOXACIN HYDROCHLORIDE 3 MG/ML
4 SOLUTION/ DROPS OPHTHALMIC 2 TIMES DAILY
Qty: 10 ML | Refills: 0 | Status: SHIPPED | OUTPATIENT
Start: 2022-05-06 | End: 2022-05-31

## 2022-05-06 NOTE — PROGRESS NOTES
Health Maintenance Due   Topic Date Due    COVID-19 Vaccine (3 - Booster for Pfizer series) 06/05/2021    Mammogram  05/10/2022     Updates were requested from care everywhere.  Chart was reviewed for overdue Proactive [facility] Encounters ([facility]) topics (CRS, Breast Cancer Screening, Eye exam)  Health Maintenance has been updated.  LINKS immunization registry triggered.  Immunizations were reconciled.

## 2022-05-06 NOTE — PROGRESS NOTES
"Referring Provider:    No referring provider defined for this encounter.  Subjective:   Patient: Anita Street [id number], :1965   Visit date:2022 11:01 AM    Chief Complaint: see below  HPI:       Other (Left ear tube dysfunction/)    Prior notes reviewed  Clinical documentation obtained by nursing staff reviewed.       VNG on 22 was positive for peripheral vestibulopathy of R ear.   Rtc on 22 c/o constant dizziness or off balance "feels like falling back"  New onset of facial pressure, nasal congestion, otalgia, muffled hearing and tinnitus (AS only) x 1 week. No relief w/ medrol.   No discolored drainage.   Taking xyzal and using flonase    Return to clinic 22  Recommended VT and augmentin/prednisone for acute sinus infection.     After completed treatment, she developed jaw stiffness and dry oral mucosa on . Went to ED.     One month ago developed left sided facial pressure/pain, ear popping and pressure, muffled hearing, tinnitus. Also with watery eyes and runny nose, also on the left.  These episodes will happen for hours at a time, but then improve.     Denies nasal congestion, purulent rhinorrhea.     Currently using flonase. Not using antihistamine now due to dry mouth.     Return clinic visit, 22  Continues to use flonase regularly, but left ear still feels pressure and fullness nearly all the time.     Same symptoms on right, but mild and intermittent.       Objective:     Physical Exam:  Vitals:  BP (!) 149/83   Pulse 70   Temp 97.7 °F (36.5 °C) (Temporal)   LMP  (LMP Unknown)   General appearance:  Well developed, well nourished    Ears:  Otoscopy of external auditory canals and tympanic membranes was normal, clinical speech reception thresholds grossly intact, no mass/lesion of auricle.    Nose:  No masses/lesions of external nose, nasal mucosa, septum, and turbinates were within normal limits.    Mouth:  No mass/lesion of lips, teeth, gums, hard/soft palate, " tongue, tonsils, or oropharynx.    Neck & Lymphatics:  No cervical lymphadenopathy, no neck mass/crepitus/ asymmetry, trachea is midline, no thyroid enlargement/tenderness/mass.      CT face, 4/16/22 independently reviewed   Sinuses clear  Very sparse opacification in left mastoid, clear middle ear, no coalescence    Audio independently  reviewed        Procedure Note - Myringotomy/Tympanostomy Tube Placement     Diagnosis: Eustachian Tube Dysfunction, Chronic Otitis Media with Effusion     DETAILS OF PROCEDURE:   Obstructing cerumen was removed from the left ear canal using magnification and appropriate instrumentation. The middle ear was inspected under the microscope. Retraction was noted. A joint decision was made to proceed with a myringotomy with or without a tympanostomy tube.  With the TM fully in view, the tympanic membrane was anesthetized and a radial incision in the anterior/inferior quadrant.  The middle ear cavity was suctioned out of minimal serous effusion. A beveled Staley tympanostomy tube was grasped with an alligator forcep and inserted into the myringotomy. A straight pick was used to push the tube into place. The patient tolerated the procedure well.  Symptoms immediately improved.          Assessment & Plan:   Peripheral vestibulopathy of right ear    Cluster headache, not intractable, unspecified chronicity pattern    Dysfunction of left eustachian tube    Left otitis media with effusion    Other orders  -     predniSONE (DELTASONE) 20 MG tablet; Take 3 tablets in the morning x 3 days, then 2 tablets in the morning x 3 days, then 1 tablet in the morning x 3 days, then 1/2 tablet in the morning x 3 days  Dispense: 20 tablet; Refill: 0        VRT referral  Meds above for ABRS      Update 4/19/22      Eustachian Tube Dysfunction, mild effusion noted noted CT, TM clear today  Continue flonase   Steroid taper    Unilateral facial pressure/pain, watery eyes, worse with pressure changes, clear  sinuses  Suspect cluster type headache  Neurology eval if no improvement with OTC ibuprofen/tylenol      Peripheral vestibulopathy   Has not started VT, will start soon       Update, 5/6/22  F/u 1 month with repeat audio  Dry ear precautions  ciloxan x 5 days

## 2022-05-20 ENCOUNTER — CLINICAL SUPPORT (OUTPATIENT)
Dept: REHABILITATION | Facility: HOSPITAL | Age: 57
End: 2022-05-20
Attending: OTOLARYNGOLOGY
Payer: COMMERCIAL

## 2022-05-20 DIAGNOSIS — R42 DIZZINESS: ICD-10-CM

## 2022-05-20 DIAGNOSIS — R26.9 ABNORMALITY OF GAIT AND MOBILITY: ICD-10-CM

## 2022-05-20 DIAGNOSIS — H81.91 PERIPHERAL VESTIBULOPATHY OF RIGHT EAR: Primary | ICD-10-CM

## 2022-05-20 PROCEDURE — 97112 NEUROMUSCULAR REEDUCATION: CPT | Performed by: PHYSICAL THERAPIST

## 2022-05-20 NOTE — PROGRESS NOTES
"[facility]   Physical Therapy Treatment Note     Name: Anita DOLL [facility] Number: [id number]    Therapy Diagnosis:   Encounter Diagnoses   Name Primary?    Peripheral vestibulopathy of right ear Yes    Dizziness     Abnormality of gait and mobility      Physician: Deni Edouard MD    Visit Date: 5/20/2022      Physician Orders: PT Eval and Treat  Medical Diagnosis from Referral: peripheral vestibulopathy of right ear  Evaluation Date: 5/5/2022  Authorization Period Expiration: 12/30/2021  Plan of Care Expiration: 8/3//2022  Visit # / Visits authorized: 1/20 (+1 for evaluation)  FOTO: 1/3     Progress Note Due on 6/4/2022     PTA Visit #: 0/5     Time In: 0920  Time Out: 1000  Total Billable Time: 40 minutes     SUBJECTIVE     Patient reports: that she feels about the same overall. She still doesn't feel right even just sitting here. Patient reports that she did keep up with HEP consistently, but she does not feel that they have helped functionally. They have gotten slightly easier to perform.     He/She was compliant with home exercise program.  Response to previous treatment: good  Functional change: none noted yet    Pain: N/A    OBJECTIVE     Objective Measures updated at progress report unless specified.       TREATMENT     Anita received the treatments listed below:       NEUROMUSCULAR RE-EDUCATION ACTIVITIES to improve Balance, Coordination, Kinesthetic, Sense, Proprioception, and Posture, smooth pursuit, saccades, VOR for (40) minutes.  The following were included:    Intervention Performed Today    Smooth Pursuit x 30" each direction,   Minimal nystagmus each direction   Saccades x 30" each direction,   Minimal nystagmus each direction but less than with smooth pursuit   VOR x 1 x 30" each (vertical and horizontal)  Had to stop at 20 seconds and break then completed remaining 10 seconds with horizontal direction.    Vestibular tracking  x Each diagonal, horizontal, and " "vertical, 30" each, with ball    Sit to stands with three head turns prior x 3x   Walking with head turns x In hallway and in turf, down and back 3x each   Tandem stance x 1' each    EC on firm and foam surface x 30" each   Rhomber stance with hand off rotation x ~10x each side - did not increase s/s    Picking up object from floor and immediately reaching OH with it x 10x - did not increase s/s   Captain Gera Stance  x With head movement instructed by PT, ~30" each foot   Discussed progressing VOR and saccades to standing at home - safely x      Plan for Next Visit:          PATIENT EDUCATION AND HOME EXERCISES     Home Exercises Provided and Patient Education Provided     Education provided:    PURPOSE: Patient educated on the impairments noted above and the effects of physical therapy intervention to improve overall condition and QOL.    EXERCISE: Patient was educated on all the above exercise prior/during/after for proper posture, positioning, and execution for safe performance with home exercise program.     Written Home Exercises Provided: yes.  Exercises were reviewed and Anita was able to demonstrate them prior to the end of the session.  Anita demonstrated good  understanding of the education provided. See EMR under Patient Instructions for exercises provided during therapy sessions.      ASSESSMENT     Patient tolerated treatment well. She completed exercises with only minimal complaints of increased dizziness. Patient mainly affected most by horizontal VOR activity. She did well with vestibular tracking, balance activities with head turns, and walking with head turns. While she veers side to side with walking/head turns, she did not really have any increased complaints of dizziness. Patient still constantly feels "off", but discussed importance of keeping up with HEP and the goal of habituation. She expressed understanding.     Anita is progressing well towards her goals.   Pt prognosis is " Good.     Pt will continue to benefit from skilled outpatient physical therapy to address the deficits listed in the problem list box on initial evaluation, provide pt/family education and to maximize pt's level of independence in the home and community environment.     Pt's spiritual, cultural and educational needs considered and pt agreeable to plan of care and goals.     Anticipated Barriers for therapy: co-morbidities, chronicity of condition and adherence to treatment plan       GOALS:     Short Term Goals:  6 weeks Progress   1. Dizziness: Patient will report decreased dizziness, and recent s/s trend is improving in order to progress towards LTG's. PC   2. Function: Patient will demonstrate improved function as indicated by a functional status score of less than or equal to 41 out of 100 on FOTO. PC   3. Gait: Patient will demonstrate improved gait mechanics in order to improve functional mobility, improve quality of life, and decrease risk of further injury or fall.  PC   4. HEP: Patient will demonstrate independence with HEP in order to progress toward functional independence. PC   5. Improve postural awareness.  PC         Long Term Goals:  12 weeks Progress   1. Dizziness:  Patient will return to normal ADL, recreational, and work related activities with less dizziness and limitation.  PC   2. Function: Patient will demonstrate improved function as indicated by a functional status score of less than or equal to 38 out of 100 on FOTO. PC   3. Gait: Patient will demonstrate normalized gait mechanics with minimal compensation in order to return to PLOF. PC   4. Decrease saccades as well as positional and VOR related s/s in order for patient to be able to switch positions or look quickly to different directions without onset of dizziness.  PC       Goals Key:  PC= progressing/continue; PM= partially met;        DC= discontinue    PLAN     Continue Plan of Care (POC) and progress per patient tolerance. See  treatment section for details on planned progressions next session.    5/5/2022 (evaluation): Outpatient Physical Therapy 1 times weekly for 12 weeks to include any combination of the following interventions: vestibular habituation exercises, dry needling, modalities, electrical stimulation (IFC, Pre-Mod, Attended with Functional Dry Needling), Cervical/Lumbar Traction, Gait Training, Manual Therapy, Neuromuscular Re-ed, Patient Education, Self Care, Therapeutic Exercise and Therapeutic Activites     Lorna Cabezas, PT

## 2022-05-24 ENCOUNTER — CLINICAL SUPPORT (OUTPATIENT)
Dept: REHABILITATION | Facility: HOSPITAL | Age: 57
End: 2022-05-24
Attending: OTOLARYNGOLOGY
Payer: COMMERCIAL

## 2022-05-24 DIAGNOSIS — H81.91 PERIPHERAL VESTIBULOPATHY OF RIGHT EAR: Primary | ICD-10-CM

## 2022-05-24 DIAGNOSIS — R26.9 ABNORMALITY OF GAIT AND MOBILITY: ICD-10-CM

## 2022-05-24 DIAGNOSIS — R42 DIZZINESS: ICD-10-CM

## 2022-05-24 PROCEDURE — 97535 SELF CARE MNGMENT TRAINING: CPT | Performed by: PHYSICAL THERAPIST

## 2022-05-24 PROCEDURE — 97112 NEUROMUSCULAR REEDUCATION: CPT | Performed by: PHYSICAL THERAPIST

## 2022-05-24 NOTE — PROGRESS NOTES
[facility]   Physical Therapy Treatment Note     Name: Anita Street  [facility] Number: [id number]    Therapy Diagnosis:   Encounter Diagnoses   Name Primary?    Peripheral vestibulopathy of right ear Yes    Dizziness     Abnormality of gait and mobility      Physician: Deni Edouard MD    Visit Date: 5/24/2022      Physician Orders: PT Eval and Treat  Medical Diagnosis from Referral: peripheral vestibulopathy of right ear  Evaluation Date: 5/5/2022  Authorization Period Expiration: 12/30/2021  Plan of Care Expiration: 8/3//2022  Visit # / Visits authorized: 2/20 (+1 for evaluation)  FOTO: 1/3     Progress Note Due on 6/4/2022     PTA Visit #: 0/5     Time In: 1048  Time Out: 1130  Total Billable Time: 42 minutes     SUBJECTIVE     Patient reports: that yesterday was a bad day. She woke up feeling dizzy. It may have been that she did not get as much rest, but she is not sure. Patient reports that she got better rest last night and woke up feeling okay this morning, but she is feeling dizzy again now. She is not sure if it was the walk through the gym, driving here, or the UBE. Patient reports that a couple of nights ago she noticed that when she turned to lay on her stomach, the room was spinning around her. It did ease up after a few minutes of lying on her stomach. She states that she does not usual sleep on her stomach so that was the first time in a while that she noticed the spinning.     He/She was compliant with home exercise program.  Response to previous treatment: good  Functional change: none noted yet    Pain: N/A    OBJECTIVE     Objective Measures updated at progress report unless specified.     [place] Hallpike (L): Negative   [place] Hallpike (R): Negative  Horizontal Canal: Negative bilaterally     TREATMENT     Anita received the treatments listed below:       NEUROMUSCULAR RE-EDUCATION ACTIVITIES to improve Balance, Coordination, Kinesthetic, Sense, Proprioception, and  "Posture, smooth pursuit, saccades, VOR for (30) minutes.  The following were included:    Intervention Performed Today    UBE for gaze stabilization and peripheral movement x 2'/2'   Smooth Pursuit  30" each direction,   Minimal nystagmus each direction   Saccades  30" each direction,   Minimal nystagmus each direction but less than with smooth pursuit   VOR x 1 - standing (plain background) x 20" x 2 each (vertical and horizontal)   Vestibular tracking   Each diagonal, horizontal, and vertical, 30" each, with ball    Sit to stands with three head turns prior  3x   Walking with head turns  In hallway and in turf, down and back 3x each   Tandem stance  1' each    EC on firm and foam surface  30" each   Rhomber stance with hand off rotation  ~10x each side - did not increase s/s    Picking up object from floor and immediately reaching OH with it  10x - did not increase s/s   Captain Gera Stance   With head movement instructed by PT, ~30" each foot   Iyer Daroff x 3x each side, pausing for 30" each   [place] Hallpike Assessment - each side x Results as above in objective   Optokinetic drum x ~5 min discussion on how to incorporate this while driving in the car to prevent motion sickness, etc.             Discussed progressing VOR and saccades to standing at home - safely, adding Iyer Daroff to HEP as well x      Plan for Next Visit:          PATIENT EDUCATION AND HOME EXERCISES     Home Exercises Provided and Patient Education Provided     Education provided: (12) minutes   PURPOSE: Patient educated on the impairments noted above and the effects of physical therapy intervention to improve overall condition and QOL.    EXERCISE: Patient was educated on all the above exercise prior/during/after for proper posture, positioning, and execution for safe performance with home exercise program.    Discussed again how VOR can cause "off sensation" with head turns and movement.    Discussed how optokinetic reflex can also " "affect dizziness and motion sensitivity such as when riding in the car. Discussed "looking through" and not focusing on each individual item   Discussed how our bodies can develop motion sensitivity or motion intolerance to certain positions when we avoid them. Therefore, discussed adding Iyer Daroff to help with positional tolerance.     Written Home Exercises Provided: yes.  Exercises were reviewed and Anita was able to demonstrate them prior to the end of the session.  Anita demonstrated good  understanding of the education provided. See EMR under Patient Instructions for exercises provided during therapy sessions.      ASSESSMENT     Patient tolerated treatment well. She reported increased dizziness with UBE today and had to stop and take a break. Patient was able to fully complete 2'/2'. She reported difficulty with optokinetic drum activity and was not able to fully look at image without feeling dizzy. Negative [place] Hallpike bilaterally today; however, patient reports motion sensitivity to each positional change on mat today. Iyer Daroff was added to treatment today to help with positional habituation, and patient was given this as part of HEP as well. She expressed understanding. Better tolerance to VOR this visit, as patient was able to perform activity standing and without having to take any breaks. She was also able to perform each repetition twice.      Anita is progressing well towards her goals.   Pt prognosis is Good.     Pt will continue to benefit from skilled outpatient physical therapy to address the deficits listed in the problem list box on initial evaluation, provide pt/family education and to maximize pt's level of independence in the home and community environment.     Pt's spiritual, cultural and educational needs considered and pt agreeable to plan of care and goals.     Anticipated Barriers for therapy: co-morbidities, chronicity of condition and adherence to treatment plan "       GOALS:     Short Term Goals:  6 weeks Progress   1. Dizziness: Patient will report decreased dizziness, and recent s/s trend is improving in order to progress towards LTG's. PC   2. Function: Patient will demonstrate improved function as indicated by a functional status score of less than or equal to 41 out of 100 on FOTO. PC   3. Gait: Patient will demonstrate improved gait mechanics in order to improve functional mobility, improve quality of life, and decrease risk of further injury or fall.  PC   4. HEP: Patient will demonstrate independence with HEP in order to progress toward functional independence. PC   5. Improve postural awareness.  PC         Long Term Goals:  12 weeks Progress   1. Dizziness:  Patient will return to normal ADL, recreational, and work related activities with less dizziness and limitation.  PC   2. Function: Patient will demonstrate improved function as indicated by a functional status score of less than or equal to 38 out of 100 on FOTO. PC   3. Gait: Patient will demonstrate normalized gait mechanics with minimal compensation in order to return to PLOF. PC   4. Decrease saccades as well as positional and VOR related s/s in order for patient to be able to switch positions or look quickly to different directions without onset of dizziness.  PC       Goals Key:  PC= progressing/continue; PM= partially met;        DC= discontinue    PLAN     Continue Plan of Care (POC) and progress per patient tolerance. See treatment section for details on planned progressions next session.    5/5/2022 (evaluation): Outpatient Physical Therapy 1 times weekly for 12 weeks to include any combination of the following interventions: vestibular habituation exercises, dry needling, modalities, electrical stimulation (IFC, Pre-Mod, Attended with Functional Dry Needling), Cervical/Lumbar Traction, Gait Training, Manual Therapy, Neuromuscular Re-ed, Patient Education, Self Care, Therapeutic Exercise and  Therapeutic Activites     Lorna Cabezas, PT

## 2022-06-02 ENCOUNTER — OFFICE VISIT (OUTPATIENT)
Dept: OPHTHALMOLOGY | Facility: CLINIC | Age: 57
End: 2022-06-02
Payer: COMMERCIAL

## 2022-06-02 DIAGNOSIS — Z01.00 ENCOUNTER FOR EYE EXAM: Primary | ICD-10-CM

## 2022-06-02 DIAGNOSIS — H52.7 REFRACTIVE ERRORS: ICD-10-CM

## 2022-06-02 PROCEDURE — 92015 DETERMINE REFRACTIVE STATE: CPT | Mod: S$GLB,,, | Performed by: OPTOMETRIST

## 2022-06-02 PROCEDURE — 92012 PR EYE EXAM, EST PATIENT,INTERMED: ICD-10-PCS | Mod: S$GLB,,, | Performed by: OPTOMETRIST

## 2022-06-02 PROCEDURE — 1159F MED LIST DOCD IN RCRD: CPT | Mod: CPTII,S$GLB,, | Performed by: OPTOMETRIST

## 2022-06-02 PROCEDURE — 92012 INTRM OPH EXAM EST PATIENT: CPT | Mod: S$GLB,,, | Performed by: OPTOMETRIST

## 2022-06-02 PROCEDURE — 99999 PR PBB SHADOW E&M-EST. PATIENT-LVL II: CPT | Mod: PBBFAC,,, | Performed by: OPTOMETRIST

## 2022-06-02 PROCEDURE — 1159F PR MEDICATION LIST DOCUMENTED IN MEDICAL RECORD: ICD-10-PCS | Mod: CPTII,S$GLB,, | Performed by: OPTOMETRIST

## 2022-06-02 PROCEDURE — 92015 PR REFRACTION: ICD-10-PCS | Mod: S$GLB,,, | Performed by: OPTOMETRIST

## 2022-06-02 PROCEDURE — 99999 PR PBB SHADOW E&M-EST. PATIENT-LVL II: ICD-10-PCS | Mod: PBBFAC,,, | Performed by: OPTOMETRIST

## 2022-06-02 NOTE — PROGRESS NOTES
HPI     Annual Eye Exam   No Visual Complaints   Is having problems with dizziness. Vestibular/ocular syndrome   Mono Vision Contact Lens      Last edited by Tenzin Mabry, OD on 6/2/2022  3:24 PM. (History)            Assessment /Plan     For exam results, see Encounter Report.    Encounter for eye exam    Refractive errors      Tried on several pairs of multifocal contacts. Poor near va with each one.    Will try distance only OU and wear readers for near Va.  Dispensed AV Oasys Daily due to dry eye complaints.    Will order Total 1 daily multifocals to see if improved Va at near.  RTC prn

## 2022-06-03 ENCOUNTER — CLINICAL SUPPORT (OUTPATIENT)
Dept: REHABILITATION | Facility: HOSPITAL | Age: 57
End: 2022-06-03
Attending: OTOLARYNGOLOGY
Payer: COMMERCIAL

## 2022-06-03 DIAGNOSIS — R42 DIZZINESS: ICD-10-CM

## 2022-06-03 DIAGNOSIS — R26.9 ABNORMALITY OF GAIT AND MOBILITY: ICD-10-CM

## 2022-06-03 DIAGNOSIS — H81.91 PERIPHERAL VESTIBULOPATHY OF RIGHT EAR: Primary | ICD-10-CM

## 2022-06-03 PROCEDURE — 97110 THERAPEUTIC EXERCISES: CPT

## 2022-06-03 NOTE — PROGRESS NOTES
"[facility]   Physical Therapy Treatment Note     Name: Anita DOLL [place]  Clinic Number: [id number]    Therapy Diagnosis:   Encounter Diagnoses   Name Primary?    Peripheral vestibulopathy of right ear Yes    Dizziness     Abnormality of gait and mobility      Physician: Deni Edouard MD    Visit Date: 6/3/2022      Physician Orders: PT Eval and Treat  Medical Diagnosis from Referral: peripheral vestibulopathy of right ear  Evaluation Date: 5/5/2022  Authorization Period Expiration: 12/30/2021  Plan of Care Expiration: 8/3//2022  Visit # / Visits authorized: 3/20 (+1 for evaluation)  FOTO: 1/3     Progress Note Due on 6/4/2022     PTA Visit #: 0/5     Time In: 0915  Time Out: 1000  Total Billable Time: 45 minutes     SUBJECTIVE     Patient reports: that she feels a bit better today.  She does still report of some dizziness with performance of cervical flexion and extension.    He/She was compliant with home exercise program.  Response to previous treatment: good  Functional change: none noted yet    Pain: N/A    OBJECTIVE     Objective Measures updated at progress report unless specified.       Eyes closed standing on blue pad: wide stance x 10 seconds, narrow stance x 8 seconds    TREATMENT     Anita received the treatments listed below:       NEUROMUSCULAR RE-EDUCATION ACTIVITIES to improve Balance, Coordination, Kinesthetic, Sense, Proprioception, and Posture, smooth pursuit, saccades, VOR for (30) minutes.  The following were included:    Intervention Performed Today    UBE for gaze stabilization and peripheral movement x 2'/2'   Smooth Pursuit  30" each direction,   Minimal nystagmus each direction   Saccades  30" each direction,   Minimal nystagmus each direction but less than with smooth pursuit   VOR x 1 - standing (plain background) x 20" x 2 each (vertical and horizontal)   Vestibular tracking   Each diagonal, horizontal, and vertical, 30" each, with ball    Sit to stands with " "2 steps x 1x10   Walking with head turns  In hallway and in turf, down and back 3x each   Tandem stance  1' each    EC on firm and foam surface  30" each   Rhomber stance with hand off rotation  ~10x each side - did not increase s/s    Picking up object from floor and immediately reaching OH with it  10x - did not increase s/s   Captain Boss Stance   With head movement instructed by PT, ~30" each foot   Iyer Daroff  3x each side, pausing for 30" each   [place] Hallpike Assessment - each side  Results as above in objective   Optokinetic drum  ~5 min discussion on how to incorporate this while driving in the car to prevent motion sickness, etc.   Walking with head movement (answering anatomy questions - cognitive tasks) X  x Vertical x 2 minutes  Horizontal x 2 minutes   Walking with eye movements x Tracking numbers on wall   Static standing in semitandem X  X  x Vertical head movements x 2 minutes  Horizontal Head movements x 2 minutes  Eye tracking x 2 minutes   Treadmill     Discussed progressing VOR and saccades to standing at home - safely, adding Iyer Daroff to HEP as well x      Plan for Next Visit:          PATIENT EDUCATION AND HOME EXERCISES     Home Exercises Provided and Patient Education Provided     Education provided: (12) minutes   PURPOSE: Patient educated on the impairments noted above and the effects of physical therapy intervention to improve overall condition and QOL.    EXERCISE: Patient was educated on all the above exercise prior/during/after for proper posture, positioning, and execution for safe performance with home exercise program.    Discussed again how VOR can cause "off sensation" with head turns and movement.    Discussed how optokinetic reflex can also affect dizziness and motion sensitivity such as when riding in the car. Discussed "looking through" and not focusing on each individual item   Discussed how our bodies can develop motion sensitivity or motion intolerance to " certain positions when we avoid them. Therefore, discussed adding Jaylon Womack to help with positional tolerance.     Written Home Exercises Provided: yes.  Exercises were reviewed and Anita was able to demonstrate them prior to the end of the session.  Anita demonstrated good  understanding of the education provided. See EMR under Patient Instructions for exercises provided during therapy sessions.      ASSESSMENT     Patient reports that her symptoms are improved today.  She does still report of some dizziness with performance of vertical head movements during walking.  She also reports of mild headache after completion of exercises.  However, there is significant decrease in complaint of symptoms.  She does report that she is optimistic that symptoms will improve.    Anita is progressing well towards her goals.   Pt prognosis is Good.     Pt will continue to benefit from skilled outpatient physical therapy to address the deficits listed in the problem list box on initial evaluation, provide pt/family education and to maximize pt's level of independence in the home and community environment.     Pt's spiritual, cultural and educational needs considered and pt agreeable to plan of care and goals.     Anticipated Barriers for therapy: co-morbidities, chronicity of condition and adherence to treatment plan       GOALS:     Short Term Goals:  6 weeks Progress   1. Dizziness: Patient will report decreased dizziness, and recent s/s trend is improving in order to progress towards LTG's. PC   2. Function: Patient will demonstrate improved function as indicated by a functional status score of less than or equal to 41 out of 100 on FOTO. PC   3. Gait: Patient will demonstrate improved gait mechanics in order to improve functional mobility, improve quality of life, and decrease risk of further injury or fall.  PC   4. HEP: Patient will demonstrate independence with HEP in order to progress toward functional  independence. PC   5. Improve postural awareness.  PC         Long Term Goals:  12 weeks Progress   1. Dizziness:  Patient will return to normal ADL, recreational, and work related activities with less dizziness and limitation.  PC   2. Function: Patient will demonstrate improved function as indicated by a functional status score of less than or equal to 38 out of 100 on FOTO. PC   3. Gait: Patient will demonstrate normalized gait mechanics with minimal compensation in order to return to PLOF. PC   4. Decrease saccades as well as positional and VOR related s/s in order for patient to be able to switch positions or look quickly to different directions without onset of dizziness.  PC       Goals Key:  PC= progressing/continue; PM= partially met;        DC= discontinue    PLAN     Continue Plan of Care (POC) and progress per patient tolerance. See treatment section for details on planned progressions next session.    5/5/2022 (evaluation): Outpatient Physical Therapy 1 times weekly for 12 weeks to include any combination of the following interventions: vestibular habituation exercises, dry needling, modalities, electrical stimulation (IFC, Pre-Mod, Attended with Functional Dry Needling), Cervical/Lumbar Traction, Gait Training, Manual Therapy, Neuromuscular Re-ed, Patient Education, Self Care, Therapeutic Exercise and Therapeutic Activites     Elio Cruz, PT

## 2022-06-09 ENCOUNTER — OFFICE VISIT (OUTPATIENT)
Dept: OTOLARYNGOLOGY | Facility: CLINIC | Age: 57
End: 2022-06-09
Payer: COMMERCIAL

## 2022-06-09 ENCOUNTER — OFFICE VISIT (OUTPATIENT)
Dept: OPHTHALMOLOGY | Facility: CLINIC | Age: 57
End: 2022-06-09
Payer: COMMERCIAL

## 2022-06-09 ENCOUNTER — CLINICAL SUPPORT (OUTPATIENT)
Dept: REHABILITATION | Facility: HOSPITAL | Age: 57
End: 2022-06-09
Attending: OTOLARYNGOLOGY
Payer: COMMERCIAL

## 2022-06-09 ENCOUNTER — CLINICAL SUPPORT (OUTPATIENT)
Dept: AUDIOLOGY | Facility: CLINIC | Age: 57
End: 2022-06-09
Payer: COMMERCIAL

## 2022-06-09 VITALS
BODY MASS INDEX: 27.44 KG/M2 | DIASTOLIC BLOOD PRESSURE: 90 MMHG | TEMPERATURE: 98 F | WEIGHT: 185.88 LBS | HEART RATE: 76 BPM | SYSTOLIC BLOOD PRESSURE: 176 MMHG

## 2022-06-09 DIAGNOSIS — H57.02 ANISOCORIA: Primary | ICD-10-CM

## 2022-06-09 DIAGNOSIS — H57.04 MYDRIASIS: ICD-10-CM

## 2022-06-09 DIAGNOSIS — Z96.22 PATENT PRESSURE EQUALIZATION (PE) TUBE: ICD-10-CM

## 2022-06-09 DIAGNOSIS — H81.91 PERIPHERAL VESTIBULOPATHY OF RIGHT EAR: Primary | ICD-10-CM

## 2022-06-09 DIAGNOSIS — H69.92 DYSFUNCTION OF LEFT EUSTACHIAN TUBE: ICD-10-CM

## 2022-06-09 DIAGNOSIS — H90.72 MIXED CONDUCTIVE AND SENSORINEURAL HEARING LOSS OF LEFT EAR WITH UNRESTRICTED HEARING OF RIGHT EAR: Primary | ICD-10-CM

## 2022-06-09 DIAGNOSIS — G44.009 CLUSTER HEADACHE, NOT INTRACTABLE, UNSPECIFIED CHRONICITY PATTERN: ICD-10-CM

## 2022-06-09 DIAGNOSIS — R26.9 ABNORMALITY OF GAIT AND MOBILITY: ICD-10-CM

## 2022-06-09 DIAGNOSIS — R42 DIZZINESS: ICD-10-CM

## 2022-06-09 PROCEDURE — 3080F PR MOST RECENT DIASTOLIC BLOOD PRESSURE >= 90 MM HG: ICD-10-PCS | Mod: CPTII,S$GLB,, | Performed by: STUDENT IN AN ORGANIZED HEALTH CARE EDUCATION/TRAINING PROGRAM

## 2022-06-09 PROCEDURE — 1159F PR MEDICATION LIST DOCUMENTED IN MEDICAL RECORD: ICD-10-PCS | Mod: CPTII,S$GLB,, | Performed by: OPTOMETRIST

## 2022-06-09 PROCEDURE — 92553 AUDIOMETRY AIR & BONE: CPT | Mod: S$GLB,,, | Performed by: AUDIOLOGIST-HEARING AID FITTER

## 2022-06-09 PROCEDURE — 3077F PR MOST RECENT SYSTOLIC BLOOD PRESSURE >= 140 MM HG: ICD-10-PCS | Mod: CPTII,S$GLB,, | Performed by: STUDENT IN AN ORGANIZED HEALTH CARE EDUCATION/TRAINING PROGRAM

## 2022-06-09 PROCEDURE — 3080F DIAST BP >= 90 MM HG: CPT | Mod: CPTII,S$GLB,, | Performed by: STUDENT IN AN ORGANIZED HEALTH CARE EDUCATION/TRAINING PROGRAM

## 2022-06-09 PROCEDURE — 99213 PR OFFICE/OUTPT VISIT, EST, LEVL III, 20-29 MIN: ICD-10-PCS | Mod: S$GLB,,, | Performed by: OPTOMETRIST

## 2022-06-09 PROCEDURE — 92567 TYMPANOMETRY: CPT | Mod: S$GLB,,, | Performed by: AUDIOLOGIST-HEARING AID FITTER

## 2022-06-09 PROCEDURE — 1159F MED LIST DOCD IN RCRD: CPT | Mod: CPTII,S$GLB,, | Performed by: OPTOMETRIST

## 2022-06-09 PROCEDURE — 99999 PR PBB SHADOW E&M-EST. PATIENT-LVL III: ICD-10-PCS | Mod: PBBFAC,,, | Performed by: STUDENT IN AN ORGANIZED HEALTH CARE EDUCATION/TRAINING PROGRAM

## 2022-06-09 PROCEDURE — 92567 PR TYMPA2METRY: ICD-10-PCS | Mod: S$GLB,,, | Performed by: AUDIOLOGIST-HEARING AID FITTER

## 2022-06-09 PROCEDURE — 3077F SYST BP >= 140 MM HG: CPT | Mod: CPTII,S$GLB,, | Performed by: STUDENT IN AN ORGANIZED HEALTH CARE EDUCATION/TRAINING PROGRAM

## 2022-06-09 PROCEDURE — 99999 PR PBB SHADOW E&M-EST. PATIENT-LVL III: CPT | Mod: PBBFAC,,, | Performed by: OPTOMETRIST

## 2022-06-09 PROCEDURE — 3008F BODY MASS INDEX DOCD: CPT | Mod: CPTII,S$GLB,, | Performed by: STUDENT IN AN ORGANIZED HEALTH CARE EDUCATION/TRAINING PROGRAM

## 2022-06-09 PROCEDURE — 99999 PR PBB SHADOW E&M-EST. PATIENT-LVL I: CPT | Mod: PBBFAC,,, | Performed by: AUDIOLOGIST-HEARING AID FITTER

## 2022-06-09 PROCEDURE — 97112 NEUROMUSCULAR REEDUCATION: CPT | Performed by: PHYSICAL THERAPIST

## 2022-06-09 PROCEDURE — 99999 PR PBB SHADOW E&M-EST. PATIENT-LVL III: CPT | Mod: PBBFAC,,, | Performed by: STUDENT IN AN ORGANIZED HEALTH CARE EDUCATION/TRAINING PROGRAM

## 2022-06-09 PROCEDURE — 99213 PR OFFICE/OUTPT VISIT, EST, LEVL III, 20-29 MIN: ICD-10-PCS | Mod: S$GLB,,, | Performed by: STUDENT IN AN ORGANIZED HEALTH CARE EDUCATION/TRAINING PROGRAM

## 2022-06-09 PROCEDURE — 92553 PR AUDIOMETRY, AIR & BONE: ICD-10-PCS | Mod: S$GLB,,, | Performed by: AUDIOLOGIST-HEARING AID FITTER

## 2022-06-09 PROCEDURE — 99999 PR PBB SHADOW E&M-EST. PATIENT-LVL III: ICD-10-PCS | Mod: PBBFAC,,, | Performed by: OPTOMETRIST

## 2022-06-09 PROCEDURE — 99213 OFFICE O/P EST LOW 20 MIN: CPT | Mod: S$GLB,,, | Performed by: OPTOMETRIST

## 2022-06-09 PROCEDURE — 99999 PR PBB SHADOW E&M-EST. PATIENT-LVL I: ICD-10-PCS | Mod: PBBFAC,,, | Performed by: AUDIOLOGIST-HEARING AID FITTER

## 2022-06-09 PROCEDURE — 3008F PR BODY MASS INDEX (BMI) DOCUMENTED: ICD-10-PCS | Mod: CPTII,S$GLB,, | Performed by: STUDENT IN AN ORGANIZED HEALTH CARE EDUCATION/TRAINING PROGRAM

## 2022-06-09 PROCEDURE — 99213 OFFICE O/P EST LOW 20 MIN: CPT | Mod: S$GLB,,, | Performed by: STUDENT IN AN ORGANIZED HEALTH CARE EDUCATION/TRAINING PROGRAM

## 2022-06-09 RX ORDER — MUPIROCIN 20 MG/G
OINTMENT TOPICAL 2 TIMES DAILY
Qty: 22 G | Refills: 0 | Status: SHIPPED | OUTPATIENT
Start: 2022-06-09 | End: 2022-06-16

## 2022-06-09 NOTE — PROGRESS NOTES
HPI     NP to DKT  Last seen by TRF on 6/2/22 for yearly eye exam associated with dizziness  Patient here today for pupils  Patient states OS pupil is larger than OD  Patient states she had a patch behind ear for 3 days took off yesterday    Last edited by Nicci Galan, PCT on 6/9/2022  3:46 PM. (History)            Assessment /Plan     For exam results, see Encounter Report.    Anisocoria  Patient recently started scopolamine patch for motion sickness 3 days prior. Patient states she removed patch 1 day prior. EOM full with no APD noted, brow ptosis longstanding, for which she sees Dr Mabry.  Recommend close observation at this time. Strict return precautions reviewed.   Mydriasis  See above.     RTC 7-10 days after discontinuing patch for Pupil recheck, sooner if any changes to vision or worsening symptoms.

## 2022-06-09 NOTE — PROGRESS NOTES
[facility]   Physical Therapy Treatment Note + Progress Note    Name: Anita Street  Clinic Number: [id number]    Therapy Diagnosis:   Encounter Diagnoses   Name Primary?    Peripheral vestibulopathy of right ear Yes    Dizziness     Abnormality of gait and mobility      Physician: Deni Edouard MD    Visit Date: 6/9/2022      Physician Orders: PT Eval and Treat  Medical Diagnosis from Referral: peripheral vestibulopathy of right ear  Evaluation Date: 5/5/2022  Authorization Period Expiration: 12/30/2021  Plan of Care Expiration: 8/3//2022  Visit # / Visits authorized: 4/20 (+1 for evaluation)  FOTO: 1/3     Progress Note Due on 7/9/2022     PTA Visit #: 0/5     Time In: 0920  Time Out: 1000  Total Billable Time: 38 minutes     SUBJECTIVE     Patient reports: that she thinks she is feeling a little bit better, but she still has that sensation that she is falling backwards. Patient is still noticing dizziness with position changes with her head, rolling over in bed, and when rotating/turning. She states today that she notices some facial swelling on the right side of her face that could be related to a sore that was in her right nostril. Difference in pupil size noted (left bigger than right), and patient reports that she has never noticed this before. Patient will be seeing her MD right after this appointment and will let him know about these s/s.    He/She was compliant with home exercise program.  Response to previous treatment: good  Functional change: minimal improvement in s/s    Pain: N/A    OBJECTIVE     Objective Measures updated at progress report unless specified.        Posture: Pt presents with postural abnormalities which include: forward head and rounded shoulders                        Visual/Auditory:   Tracking/Smooth Pursuits:Impaired: minimal nystagmus noted each direction  Gaze Stabilization: minimal nystagmus noted each direction   Saccades: Impaired: minimal  "nystagmus each direction but no s/s  VOR: Impaired: very dizzy/off, especially with vertical movement          MUSCLE LENGTH:      Muscle Tested  Right   6/9/2022  Left  6/9/2022  Goal   Upper Trapezius  decreased decreased Normal B    Levator Scapulae  decreased decreased Normal B   Sternocleidomastoid decreased decreased Normal B   Scalenes  decreased decreased Normal B    Pectoralis Minor  decreased decreased Normal B         Gait Analysis: The patient ambulated with the following assistive device: none; the pt presents with the following gait abnormalities: increased EMEKA, decreased step length bilateral and slight veering to the left     Movement Analysis: slower, guarded movements when standing         Function:      TREATMENT     Anita received the treatments listed below:       NEUROMUSCULAR RE-EDUCATION ACTIVITIES to improve Balance, Coordination, Kinesthetic, Sense, Proprioception, and Posture, smooth pursuit, saccades, VOR for (38) minutes.  The following were included:    Intervention Performed Today    UBE for gaze stabilization and peripheral movement  2'/2'   Smooth Pursuit  30" each direction,   Minimal nystagmus each direction   Saccades  30" each direction,   Minimal nystagmus each direction but less than with smooth pursuit   VOR x 1 - standing (plain background)  20" x 2 each (vertical and horizontal)   Vestibular tracking   Each diagonal, horizontal, and vertical, 30" each, with ball    Sit to stands with 2 steps  1x10   Walking with head turns  In hallway and in turf, down and back 3x each   Tandem stance  1' each    EC on foam surface x 30"    Rhomber stance with hand off rotation  ~10x each side - did not increase s/s    Picking up object from floor and immediately reaching OH with it  10x - did not increase s/s   Captain Gera Stance   With head movement instructed by PT, ~30" each foot   Iyer Daroff  3x each side, pausing for 30" each   Jw Hallpike Assessment - each side  Results as " "above in objective   Optokinetic drum  ~5 min discussion on how to incorporate this while driving in the car to prevent motion sickness, etc.   Walking with head movement (answering anatomy questions - cognitive tasks)    Vertical x 2 minutes  Horizontal x 2 minutes   Walking with eye movements  Tracking numbers on wall   Static standing in semitandem x  X  x Vertical head movements x 2 minutes  Horizontal Head movements x 2 minutes  Eye tracking x 2 minutes   Pivots x ~10x each direction    item from floor then rotate, then reach up, then rotate to the other side x 5x   Treadmill     Discussed progressing VOR and saccades to standing at home - safely, adding Iyer Daroff to HEP as well. Discussed adding pivots/rotations as part of HEP x    Progress Note x Re-assessment as stated above in objective section     Plan for Next Visit:        PATIENT EDUCATION AND HOME EXERCISES     Home Exercises Provided and Patient Education Provided     Education provided: (0) minutes   PURPOSE: Patient educated on the impairments noted above and the effects of physical therapy intervention to improve overall condition and QOL.    EXERCISE: Patient was educated on all the above exercise prior/during/after for proper posture, positioning, and execution for safe performance with home exercise program.    Discussed again how VOR can cause "off sensation" with head turns and movement.    Discussed how optokinetic reflex can also affect dizziness and motion sensitivity such as when riding in the car. Discussed "looking through" and not focusing on each individual item   Discussed how our bodies can develop motion sensitivity or motion intolerance to certain positions when we avoid them. Therefore, discussed adding Iyer Daroff to help with positional tolerance.     Written Home Exercises Provided: yes.  Exercises were reviewed and Anita was able to demonstrate them prior to the end of the session.  Anita demonstrated " "good  understanding of the education provided. See EMR under Patient Instructions for exercises provided during therapy sessions.      ASSESSMENT     Patient did well with treatment today and has attended 5 total PT visits. She demonstrates improvements with EC balance on unstable surface. Her tolerance to smooth pursuit, saccades, and VOR have all improved, and she is now able to perform them standing with less s/s. Patient does still have symptoms while performing these exercises, especially with vertical directions, but tolerance has improved as well as decreased nystagmus as compared to previous visits. Discussed with patient that she is demonstrating positive trend of improvements but that this treatment takes time. She expressed understanding but is still frustrated and worried that she will never feel "back to normal". Left pupil noted to be slightly larger than right pupil this visit. This has not been noted in previous visits. Advised patient to let MD know. She would benefit from continued vestibular habituation exercises per tolerance.     Anita is progressing well towards her goals.   Pt prognosis is Good.     Pt will continue to benefit from skilled outpatient physical therapy to address the deficits listed in the problem list box on initial evaluation, provide pt/family education and to maximize pt's level of independence in the home and community environment.     Pt's spiritual, cultural and educational needs considered and pt agreeable to plan of care and goals.     Anticipated Barriers for therapy: co-morbidities, chronicity of condition and adherence to treatment plan       GOALS:     Short Term Goals:  6 weeks Progress   1. Dizziness: Patient will report decreased dizziness, and recent s/s trend is improving in order to progress towards LTG's. PM   2. Function: Patient will demonstrate improved function as indicated by a functional status score of less than or equal to 41 out of 100 on FOTO. " PC   3. Gait: Patient will demonstrate improved gait mechanics in order to improve functional mobility, improve quality of life, and decrease risk of further injury or fall.  PC   4. HEP: Patient will demonstrate independence with HEP in order to progress toward functional independence. Met   5. Improve postural awareness.  Met         Long Term Goals:  12 weeks Progress   1. Dizziness:  Patient will return to normal ADL, recreational, and work related activities with less dizziness and limitation.  PC   2. Function: Patient will demonstrate improved function as indicated by a functional status score of less than or equal to 38 out of 100 on FOTO. PC   3. Gait: Patient will demonstrate normalized gait mechanics with minimal compensation in order to return to PLOF. PC   4. Decrease saccades as well as positional and VOR related s/s in order for patient to be able to switch positions or look quickly to different directions without onset of dizziness.  PM       Goals Key:  PC= progressing/continue; PM= partially met;        DC= discontinue    PLAN     Continue Plan of Care (POC) and progress per patient tolerance. See treatment section for details on planned progressions next session.    6/9/2022: It is my recommendation that patient continue with PT at his current frequency of 2 times per week for the remainder of her approved visits. Her treatment plan will remain the same, and she will be progressed appropriately.     5/5/2022 (evaluation): Outpatient Physical Therapy 1 times weekly for 12 weeks to include any combination of the following interventions: vestibular habituation exercises, dry needling, modalities, electrical stimulation (IFC, Pre-Mod, Attended with Functional Dry Needling), Cervical/Lumbar Traction, Gait Training, Manual Therapy, Neuromuscular Re-ed, Patient Education, Self Care, Therapeutic Exercise and Therapeutic Activites     Lorna Cabezas, PT

## 2022-06-09 NOTE — PROGRESS NOTES
"Referring Provider:    No referring provider defined for this encounter.  Subjective:   Patient: Anita Street [id number], :1965   Visit date:2022 11:01 AM    Chief Complaint: see below  HPI:       Follow-up (1mo f/u tube placement) and Sinus Problem (?abcess left nostril.  C/o left facial swelling. Started this week)    Prior notes reviewed  Clinical documentation obtained by nursing staff reviewed.       VNG on 22 was positive for peripheral vestibulopathy of R ear.   Rtc on 22 c/o constant dizziness or off balance "feels like falling back"  New onset of facial pressure, nasal congestion, otalgia, muffled hearing and tinnitus (AS only) x 1 week. No relief w/ medrol.   No discolored drainage.   Taking xyzal and using flonase    Return to clinic 22  Recommended VT and augmentin/prednisone for acute sinus infection.     After completed treatment, she developed jaw stiffness and dry oral mucosa on . Went to ED.     One month ago developed left sided facial pressure/pain, ear popping and pressure, muffled hearing, tinnitus. Also with watery eyes and runny nose, also on the left.  These episodes will happen for hours at a time, but then improve.     Denies nasal congestion, purulent rhinorrhea.     Currently using flonase. Not using antihistamine now due to dry mouth.     Return clinic visit, 22  Continues to use flonase regularly, but left ear still feels pressure and fullness nearly all the time.     Same symptoms on right, but mild and intermittent.     Return clinic visit, 22  Ear pressure/fullness improved since placement of tube.  dizziness somewhat improved, mostly when changing positions, rotating the head. Feels like falling backward when stands. Has started VT with some improvement.  Tinnitus - much improved after tube placement, but not completely resolved    Still having intermittent episodes of headache associated with runny nose, stabbing pain, watery eyes. "     Tried scop patch for last few days with minimal improvement. This did dry out her nose and she had some crusting, bleeding, and tenderness of the right nostril. Tenderness improved today.        Objective:     Physical Exam:  Vitals:  BP (!) 176/90   Pulse 76   Temp 97.7 °F (36.5 °C) (Temporal)   Wt 84.3 kg (185 lb 13.6 oz)   LMP  (LMP Unknown)   BMI 27.44 kg/m²   General appearance:  Well developed, well nourished    Ears:  Otoscopy of external auditory canals and tympanic membranes with patent L PET, clinical speech reception thresholds grossly intact, no mass/lesion of auricle.    Nose:  No masses/lesions of external nose, nasal mucosa, septum, and turbinates were within normal limits.    Mouth:  No mass/lesion of lips, teeth, gums, hard/soft palate, tongue, tonsils, or oropharynx.    Neck & Lymphatics:  No cervical lymphadenopathy, no neck mass/crepitus/ asymmetry, trachea is midline, no thyroid enlargement/tenderness/mass.      CT face, 4/16/22 independently reviewed   Sinuses clear  Very sparse opacification in left mastoid, clear middle ear, no coalescence    Audio independently  reviewed              Assessment & Plan:     Eustachian Tube Dysfunction, mild effusion noted noted CT, tube placed 5/6/22    Unilateral facial pressure/pain, watery eyes, worse with pressure changes, clear sinuses    Peripheral vestibulopathy       Update, 6/9/22  L PET in place and patent     Nasal dryness/crusting - mupirocin/ayr gel  Again recommended Neurology referral for cluster type HA symptoms vs vestibular migraine   Continue VRT; she would also like to see [facility] hearing and balance - referral placed

## 2022-06-10 NOTE — PROGRESS NOTES
Referring Provider:Dr. Javan Street was seen 06/09/2022 for an audiological evaluation.  Patient is here for repeat audio after PET placement.     Results reveal normal hearing from 250-8000 Hz for the right ear, and a mild mixed hearing loss 250-8000 Hz for the left ear. Tympanograms were Type A, normal for the right ear and Type B large volume, PE tube for the left ear.    Patient was counseled on the above findings.    Recommendations:  1. ENT review  2. Annual Audiograms

## 2022-06-14 ENCOUNTER — PATIENT MESSAGE (OUTPATIENT)
Dept: OPHTHALMOLOGY | Facility: CLINIC | Age: 57
End: 2022-06-14
Payer: COMMERCIAL

## 2022-06-15 ENCOUNTER — TELEPHONE (OUTPATIENT)
Dept: OPHTHALMOLOGY | Facility: CLINIC | Age: 57
End: 2022-06-15
Payer: COMMERCIAL

## 2022-06-16 ENCOUNTER — PATIENT MESSAGE (OUTPATIENT)
Dept: INTERNAL MEDICINE | Facility: CLINIC | Age: 57
End: 2022-06-16
Payer: COMMERCIAL

## 2022-06-16 ENCOUNTER — PATIENT MESSAGE (OUTPATIENT)
Dept: OPHTHALMOLOGY | Facility: CLINIC | Age: 57
End: 2022-06-16
Payer: COMMERCIAL

## 2022-06-17 ENCOUNTER — PATIENT MESSAGE (OUTPATIENT)
Dept: OPHTHALMOLOGY | Facility: CLINIC | Age: 57
End: 2022-06-17
Payer: COMMERCIAL

## 2022-06-20 ENCOUNTER — OFFICE VISIT (OUTPATIENT)
Dept: INTERNAL MEDICINE | Facility: CLINIC | Age: 57
End: 2022-06-20
Payer: COMMERCIAL

## 2022-06-20 DIAGNOSIS — I10 ESSENTIAL HYPERTENSION: ICD-10-CM

## 2022-06-20 DIAGNOSIS — R26.89 BALANCE PROBLEM: ICD-10-CM

## 2022-06-20 DIAGNOSIS — R68.2 DRY MOUTH: Primary | ICD-10-CM

## 2022-06-20 DIAGNOSIS — Z00.00 ANNUAL PHYSICAL EXAM: ICD-10-CM

## 2022-06-20 DIAGNOSIS — F41.1 GAD (GENERALIZED ANXIETY DISORDER): ICD-10-CM

## 2022-06-20 PROCEDURE — 99214 PR OFFICE/OUTPT VISIT, EST, LEVL IV, 30-39 MIN: ICD-10-PCS | Mod: 95,,, | Performed by: FAMILY MEDICINE

## 2022-06-20 PROCEDURE — 1160F PR REVIEW ALL MEDS BY PRESCRIBER/CLIN PHARMACIST DOCUMENTED: ICD-10-PCS | Mod: CPTII,95,, | Performed by: FAMILY MEDICINE

## 2022-06-20 PROCEDURE — 99214 OFFICE O/P EST MOD 30 MIN: CPT | Mod: 95,,, | Performed by: FAMILY MEDICINE

## 2022-06-20 PROCEDURE — 1159F PR MEDICATION LIST DOCUMENTED IN MEDICAL RECORD: ICD-10-PCS | Mod: CPTII,95,, | Performed by: FAMILY MEDICINE

## 2022-06-20 PROCEDURE — 1160F RVW MEDS BY RX/DR IN RCRD: CPT | Mod: CPTII,95,, | Performed by: FAMILY MEDICINE

## 2022-06-20 PROCEDURE — 1159F MED LIST DOCD IN RCRD: CPT | Mod: CPTII,95,, | Performed by: FAMILY MEDICINE

## 2022-06-20 RX ORDER — FLUOXETINE HYDROCHLORIDE 20 MG/1
20 CAPSULE ORAL DAILY
Qty: 30 CAPSULE | Refills: 3 | Status: SHIPPED | OUTPATIENT
Start: 2022-06-20 | End: 2022-06-20 | Stop reason: SDUPTHER

## 2022-06-20 RX ORDER — HYDROCHLOROTHIAZIDE 25 MG/1
25 TABLET ORAL DAILY
Qty: 90 TABLET | Refills: 1 | Status: SHIPPED | OUTPATIENT
Start: 2022-06-20 | End: 2023-02-01

## 2022-06-20 RX ORDER — HYDROCHLOROTHIAZIDE 25 MG/1
25 TABLET ORAL DAILY
Qty: 30 TABLET | Refills: 3 | Status: SHIPPED | OUTPATIENT
Start: 2022-06-20 | End: 2022-06-20 | Stop reason: SDUPTHER

## 2022-06-20 RX ORDER — FLUOXETINE HYDROCHLORIDE 20 MG/1
20 CAPSULE ORAL DAILY
Qty: 90 CAPSULE | Refills: 1 | Status: SHIPPED | OUTPATIENT
Start: 2022-06-20 | End: 2023-11-10

## 2022-06-20 NOTE — PROGRESS NOTES
Subjective:      Patient ID: Anita Street is a 56 y.o. female.    Chief Complaint: Rhinitis, Imbalance, and Dry mouth    HPI 56 y.o.   female patient with a PMHx of anxiety, depression, and GERD presents to clinic for Telemedicine Visit. The patient was last seen on June 7, 2021      Today she reports she is doing ok. Patient reports she is still experiencing balance issues. She states she is very unstable on her feet, with fast movements she stumble. Patient reports it is causing her anxiety, wanting to get back on meds. She reports rhinitis and dry mouth. Patient otherwise without complaints. Denies SOB, chest pain, and bowel changes.    The patient location is: At work  The chief complaint leading to consultation is: Imbalance, rhinitis, and dry mouth    Visit type: audiovisual     Face to Face time with patient: 10 mins   minutes of total time spent on the encounter, which includes face to face time and non-face to face time preparing to see the patient (eg, review of tests), Obtaining and/or reviewing separately obtained history, Documenting clinical information in the electronic or other health record, Independently interpreting results (not separately reported) and communicating results to the patient/family/caregiver, or Care coordination (not separately reported).            Each patient to whom he or she provides medical services by telemedicine is:  (1) informed of the relationship between the physician and patient and the respective role of any other health care provider with respect to management of the patient; and (2) notified that he or she may decline to receive medical services by telemedicine and may withdraw from such care at any time.     Notes:     Past Medical History:   Diagnosis Date    Anxiety     Depression     GERD (gastroesophageal reflux disease)      Family History   Problem Relation Age of Onset    Anxiety disorder Mother     Depression Mother     Diabetes Mother      Diabetes Father     Hypertension Father     Heart disease Father     Prostate cancer Father     Cataracts Daughter     Hyperlipidemia Brother     Hypertension Brother     Heart disease Brother     Strabismus Daughter     Colon cancer Neg Hx     Breast cancer Neg Hx      Past Surgical History:   Procedure Laterality Date    CHOLECYSTECTOMY      COLONOSCOPY N/A 11/13/2017    Procedure: COLONOSCOPY;  Surgeon: Yina Macias MD;  [facility];  Service: Endoscopy;  Laterality: N/A;    HYSTERECTOMY      severe cervical dysplasia, no cancer, several paps normal since    pelvic lap      Feritility     Social History     Tobacco Use    Smoking status: Never Smoker    Smokeless tobacco: Never Used   Substance Use Topics    Alcohol use: Yes     Comment: Rare Occ    Drug use: No       LMP  (LMP Unknown)     Review of Systems   Constitutional: Negative for activity change, appetite change, chills, diaphoresis, fatigue, fever and unexpected weight change.   HENT: Negative for congestion, ear pain, hearing loss, postnasal drip, rhinorrhea, sinus pain, sore throat and trouble swallowing.    Eyes: Negative for pain, discharge, itching and visual disturbance.   Respiratory: Negative for cough, chest tightness, shortness of breath and wheezing.    Cardiovascular: Negative for chest pain, palpitations and leg swelling.   Gastrointestinal: Negative for abdominal pain, blood in stool, constipation, diarrhea, nausea and vomiting.   Endocrine: Negative for polydipsia and polyuria.   Genitourinary: Negative for difficulty urinating, dysuria, flank pain, frequency, hematuria, menstrual problem, pelvic pain and urgency.   Musculoskeletal: Negative for arthralgias, back pain, joint swelling, myalgias and neck pain.   Skin: Negative for color change and rash.   Neurological: Negative for dizziness, weakness, light-headedness and headaches.   Hematological: Negative for adenopathy.   Psychiatric/Behavioral: Negative for  confusion, decreased concentration and dysphoric mood.       Objective:     Physical Exam  Constitutional:       General: She is not in acute distress.     Appearance: She is not diaphoretic.   HENT:      Head: Normocephalic and atraumatic.   Eyes:      General:         Right eye: No discharge.         Left eye: No discharge.   Pulmonary:      Effort: Pulmonary effort is normal. No respiratory distress.   Neurological:      Mental Status: She is oriented to person, place, and time.   Psychiatric:         Mood and Affect: Mood normal.         Behavior: Behavior normal.         Thought Content: Thought content normal.         Judgment: Judgment normal.         Lab Results   Component Value Date    WBC 12.09 04/16/2022    HGB 13.8 04/16/2022    HCT 42.0 04/16/2022     04/16/2022    CHOL 181 06/07/2021    TRIG 117 06/07/2021    HDL 47 06/07/2021    ALT 18 04/16/2022    AST 17 04/16/2022     04/16/2022    K 3.7 04/16/2022    CL 98 04/16/2022    CREATININE 0.9 04/16/2022    BUN 18 04/16/2022    CO2 27 04/16/2022    TSH 1.852 04/16/2022    HGBA1C 5.6 06/07/2021       Assessment:     1. Dry mouth    2. Balance problem    3. MADDIE (generalized anxiety disorder)    4. Essential hypertension    5. Annual physical exam         Plan:     Dry mouth    Balance problem    MADDIE (generalized anxiety disorder)    Essential hypertension  -     Basic Metabolic Panel; Future; Expected date: 08/01/2022    Annual physical exam  -     Hemoglobin A1C; Future; Expected date: 08/01/2022  -     Lipid Panel; Future; Expected date: 08/01/2022    Other orders  -     Discontinue: hydroCHLOROthiazide (HYDRODIURIL) 25 MG tablet; Take 1 tablet (25 mg total) by mouth once daily.  Dispense: 30 tablet; Refill: 3  -     Discontinue: FLUoxetine 20 MG capsule; Take 1 capsule (20 mg total) by mouth once daily.  Dispense: 30 capsule; Refill: 3  -     hydroCHLOROthiazide (HYDRODIURIL) 25 MG tablet; Take 1 tablet (25 mg total) by mouth once daily.   Dispense: 90 tablet; Refill: 1  -     FLUoxetine 20 MG capsule; Take 1 capsule (20 mg total) by mouth once daily.  Dispense: 90 capsule; Refill: 1        Patient is advised she can get back on the anxiety medications.  She is informed she will need to come into the clinic to for labs.  Patient is advised per previous BP readings, her BP does run high.   States the HCTZ works better than the Norvasc.  Will send in prozac 20mg.  Per ENT notes patient is encouraged to see neurology.  Will follow up with labs      Documentation entered by Bhavna Santos, acting as scribe for Dr. Dayton Sierra. 06/20/2022 2:57 PM.

## 2022-06-21 ENCOUNTER — TELEPHONE (OUTPATIENT)
Dept: OTOLARYNGOLOGY | Facility: CLINIC | Age: 57
End: 2022-06-21
Payer: COMMERCIAL

## 2022-06-21 ENCOUNTER — CLINICAL SUPPORT (OUTPATIENT)
Dept: REHABILITATION | Facility: HOSPITAL | Age: 57
End: 2022-06-21
Attending: OTOLARYNGOLOGY
Payer: COMMERCIAL

## 2022-06-21 DIAGNOSIS — R26.9 ABNORMALITY OF GAIT AND MOBILITY: ICD-10-CM

## 2022-06-21 DIAGNOSIS — H81.91 PERIPHERAL VESTIBULOPATHY OF RIGHT EAR: Primary | ICD-10-CM

## 2022-06-21 DIAGNOSIS — R42 DIZZINESS: ICD-10-CM

## 2022-06-21 PROCEDURE — 97535 SELF CARE MNGMENT TRAINING: CPT | Performed by: PHYSICAL THERAPIST

## 2022-06-21 PROCEDURE — 97112 NEUROMUSCULAR REEDUCATION: CPT | Performed by: PHYSICAL THERAPIST

## 2022-06-21 NOTE — TELEPHONE ENCOUNTER
----- Message from Ann Siddiqi sent at 6/21/2022  2:10 PM CDT -----  Contact: pt  The pt request a return call concerning a referral, no additional info given and can be reached at [phone]///[id number]

## 2022-06-21 NOTE — PROGRESS NOTES
[facility]   Physical Therapy Treatment Note     Name: Anita Street  [facility] Number: [id number]    Therapy Diagnosis:   Encounter Diagnoses   Name Primary?    Peripheral vestibulopathy of right ear Yes    Dizziness     Abnormality of gait and mobility      Physician: Deni Edouard MD    Visit Date: 6/21/2022      Physician Orders: PT Eval and Treat  Medical Diagnosis from Referral: peripheral vestibulopathy of right ear  Evaluation Date: 5/5/2022  Authorization Period Expiration: 12/30/2021  Plan of Care Expiration: 8/3//2022  Visit # / Visits authorized: 5/20 (+1 for evaluation)  FOTO: 1/3     Progress Note Due on 7/9/2022     PTA Visit #: 0/5     Time In: 1050  Time Out: 1129  Total Billable Time: 39 minutes     SUBJECTIVE     Patient reports: that she still feels bad. She has reached out to her PCP about getting back on some anxiety medication because she just does not feel like herself. Patient reports that she has been having tinnitus in her left ear ever since she was diagnosed with Eustachian tube dysfunction a few months ago. The tube is still in her ear currently. However, her dizziness was present before this issue. Patient reports that she still has the feeling of falling back when she first stands up or while walking at times, but it is not as drastic as it was prior to beginning PT. Patient reports that she still has to perform slow, purposeful movements to avoid dizziness or LOB. She still has some degree of dizziness with turning her head, but she does not feel her symptoms are as intense as initially. Patient reports that when she lays down in bed, she no longer feels like she is laying in trendelenburg.       He/She was compliant with home exercise program.  Response to previous treatment: good  Functional change: minimal improvement in s/s    Pain: N/A    OBJECTIVE     Objective Measures updated at progress report unless specified.        TREATMENT     Anita  "received the treatments listed below:       NEUROMUSCULAR RE-EDUCATION ACTIVITIES to improve Balance, Coordination, Kinesthetic, Sense, Proprioception, and Posture, smooth pursuit, saccades, VOR for (23) minutes.  The following were included:    Intervention Performed Today    UBE for gaze stabilization and peripheral movement  2'/2'   Smooth Pursuit x 30" each direction,   Minimal nystagmus each direction   Saccades  30" each direction,   Minimal nystagmus each direction but less than with smooth pursuit   VOR x 1 - standing (plain background)  20" x 2 each (vertical and horizontal)   Vestibular tracking   Each diagonal, horizontal, and vertical, 30" each, with ball    Sit to stands with 2 steps  1x10   Walking with head turns  In hallway and in turf, down and back 3x each   Tandem stance  1' each    EC on foam surface  30"    Rhomber stance with hand off rotation  ~10x each side - did not increase s/s    Picking up object from floor and immediately reaching OH with it  10x - did not increase s/s   Captain Gera Stance   With head movement instructed by PT, ~30" each foot   Iyer Daroff  3x each side, pausing for 30" each   Langston Hallpike Assessment - each side  Results as above in objective   Optokinetic drum  ~5 min discussion on how to incorporate this while driving in the car to prevent motion sickness, etc.   Walking with head movement    x On turf, adding pivots, 4 minutes   Walking with eye movements  Tracking numbers on wall   Static standing in semitandem x  X  x Vertical head movements x 2 minutes, captain gera stance  Horizontal Head movements x 2 minutes  Eye tracking x 2 minutes   Pivots to saccades on cabinet x 30" x 4    item from floor then rotate, then reach up, then rotate to the other side  5x   Treadmill     Discussion of HEP and continuing with habituation exercises, especially with going to the right x    Progress Note  Re-assessment as stated above in objective section     Plan for " "Next Visit:        PATIENT EDUCATION AND HOME EXERCISES     Home Exercises Provided and Patient Education Provided     Education provided: (16) minutes   PURPOSE: Patient educated on the impairments noted above and the effects of physical therapy intervention to improve overall condition and QOL.    EXERCISE: Patient was educated on all the above exercise prior/during/after for proper posture, positioning, and execution for safe performance with home exercise program.    Discussed again how VOR can cause "off sensation" with head turns and movement.    Discussed how optokinetic reflex can also affect dizziness and motion sensitivity such as when riding in the car. Discussed "looking through" and not focusing on each individual item   Discussed how our bodies can develop motion sensitivity or motion intolerance to certain positions when we avoid them. Therefore, discussed adding Iyer Daroff to help with positional tolerance.    Discussed vestibular weakness and how it takes time to feel fully back to "normal"   Discussed that she is stating positive improvements overall as compared to prior to beginning PT.   Discussed habituating to the right since she uses her let so much   Discussed optokinetic drum    Written Home Exercises Provided: yes.  Exercises were reviewed and Anita was able to demonstrate them prior to the end of the session.  Anita demonstrated good  understanding of the education provided. See EMR under Patient Instructions for exercises provided during therapy sessions.      ASSESSMENT     Patient tolerated treatment well. She demonstrated decreased nystagmus during smooth pursuit and saccade activities today. Her tolerance to vestibular exercises, even in standing, has improved. Patient has been able to be progressed to standing VOR while challenging balance. Discussed again with patient that she is demonstrating positive trend of improvements but that this treatment takes time. She " did well with walking head movements, but has difficulty when adding in quick turns/pivots. Patient would benefit from continued habituation activities.       Anita is progressing well towards her goals.   Pt prognosis is Good.     Pt will continue to benefit from skilled outpatient physical therapy to address the deficits listed in the problem list box on initial evaluation, provide pt/family education and to maximize pt's level of independence in the home and community environment.     Pt's spiritual, cultural and educational needs considered and pt agreeable to plan of care and goals.     Anticipated Barriers for therapy: co-morbidities, chronicity of condition and adherence to treatment plan       GOALS:     Short Term Goals:  6 weeks Progress   1. Dizziness: Patient will report decreased dizziness, and recent s/s trend is improving in order to progress towards LTG's. PM   2. Function: Patient will demonstrate improved function as indicated by a functional status score of less than or equal to 41 out of 100 on FOTO. PC   3. Gait: Patient will demonstrate improved gait mechanics in order to improve functional mobility, improve quality of life, and decrease risk of further injury or fall.  PC   4. HEP: Patient will demonstrate independence with HEP in order to progress toward functional independence. Met   5. Improve postural awareness.  Met         Long Term Goals:  12 weeks Progress   1. Dizziness:  Patient will return to normal ADL, recreational, and work related activities with less dizziness and limitation.  PC   2. Function: Patient will demonstrate improved function as indicated by a functional status score of less than or equal to 38 out of 100 on FOTO. PC   3. Gait: Patient will demonstrate normalized gait mechanics with minimal compensation in order to return to PLOF. PC   4. Decrease saccades as well as positional and VOR related s/s in order for patient to be able to switch positions or look  quickly to different directions without onset of dizziness.  PM       Goals Key:  PC= progressing/continue; PM= partially met;        DC= discontinue    PLAN     Continue Plan of Care (POC) and progress per patient tolerance. See treatment section for details on planned progressions next session.    6/9/2022: It is my recommendation that patient continue with PT at his current frequency of 2 times per week for the remainder of her approved visits. Her treatment plan will remain the same, and she will be progressed appropriately.     5/5/2022 (evaluation): Outpatient Physical Therapy 1 times weekly for 12 weeks to include any combination of the following interventions: vestibular habituation exercises, dry needling, modalities, electrical stimulation (IFC, Pre-Mod, Attended with Functional Dry Needling), Cervical/Lumbar Traction, Gait Training, Manual Therapy, Neuromuscular Re-ed, Patient Education, Self Care, Therapeutic Exercise and Therapeutic Activites     Lorna Cabezas, PT

## 2022-06-21 NOTE — TELEPHONE ENCOUNTER
Called pt back and faxed over referral to [facility] per Dr Reynolds note on 6/9/22 for referral

## 2022-06-29 ENCOUNTER — OFFICE VISIT (OUTPATIENT)
Dept: NEUROLOGY | Facility: CLINIC | Age: 57
End: 2022-06-29
Payer: COMMERCIAL

## 2022-06-29 ENCOUNTER — PATIENT MESSAGE (OUTPATIENT)
Dept: NEUROLOGY | Facility: CLINIC | Age: 57
End: 2022-06-29

## 2022-06-29 VITALS
HEIGHT: 69 IN | SYSTOLIC BLOOD PRESSURE: 130 MMHG | BODY MASS INDEX: 27.46 KG/M2 | WEIGHT: 185.44 LBS | RESPIRATION RATE: 16 BRPM | OXYGEN SATURATION: 99 % | DIASTOLIC BLOOD PRESSURE: 84 MMHG | HEART RATE: 78 BPM

## 2022-06-29 DIAGNOSIS — R42 MAL DE DEBARQUEMENT: ICD-10-CM

## 2022-06-29 DIAGNOSIS — H81.8X9 PERSISTENT POSTURAL-PERCEPTUAL DIZZINESS: ICD-10-CM

## 2022-06-29 DIAGNOSIS — R26.9 ABNORMALITY OF GAIT AND MOBILITY: ICD-10-CM

## 2022-06-29 DIAGNOSIS — R42 VERTIGO: ICD-10-CM

## 2022-06-29 DIAGNOSIS — F32.A DEPRESSION, UNSPECIFIED DEPRESSION TYPE: ICD-10-CM

## 2022-06-29 DIAGNOSIS — R42 DIZZINESS: ICD-10-CM

## 2022-06-29 DIAGNOSIS — F41.1 GENERALIZED ANXIETY DISORDER: ICD-10-CM

## 2022-06-29 DIAGNOSIS — H83.2X9 VESTIBULAR DISEQUILIBRIUM, UNSPECIFIED LATERALITY: Primary | ICD-10-CM

## 2022-06-29 PROCEDURE — 99417 PROLNG OP E/M EACH 15 MIN: CPT | Mod: S$GLB,,, | Performed by: PSYCHIATRY & NEUROLOGY

## 2022-06-29 PROCEDURE — 99417 PR PROLONGED SVC, OUTPT, W/WO DIRECT PT CONTACT,  EA ADDTL 15 MIN: ICD-10-PCS | Mod: S$GLB,,, | Performed by: PSYCHIATRY & NEUROLOGY

## 2022-06-29 PROCEDURE — 1159F MED LIST DOCD IN RCRD: CPT | Mod: CPTII,S$GLB,, | Performed by: PSYCHIATRY & NEUROLOGY

## 2022-06-29 PROCEDURE — 1159F PR MEDICATION LIST DOCUMENTED IN MEDICAL RECORD: ICD-10-PCS | Mod: CPTII,S$GLB,, | Performed by: PSYCHIATRY & NEUROLOGY

## 2022-06-29 PROCEDURE — 99205 OFFICE O/P NEW HI 60 MIN: CPT | Mod: S$GLB,,, | Performed by: PSYCHIATRY & NEUROLOGY

## 2022-06-29 PROCEDURE — 3008F BODY MASS INDEX DOCD: CPT | Mod: CPTII,S$GLB,, | Performed by: PSYCHIATRY & NEUROLOGY

## 2022-06-29 PROCEDURE — 3079F DIAST BP 80-89 MM HG: CPT | Mod: CPTII,S$GLB,, | Performed by: PSYCHIATRY & NEUROLOGY

## 2022-06-29 PROCEDURE — 99999 PR PBB SHADOW E&M-EST. PATIENT-LVL V: ICD-10-PCS | Mod: PBBFAC,,, | Performed by: PSYCHIATRY & NEUROLOGY

## 2022-06-29 PROCEDURE — 3075F PR MOST RECENT SYSTOLIC BLOOD PRESS GE 130-139MM HG: ICD-10-PCS | Mod: CPTII,S$GLB,, | Performed by: PSYCHIATRY & NEUROLOGY

## 2022-06-29 PROCEDURE — 3075F SYST BP GE 130 - 139MM HG: CPT | Mod: CPTII,S$GLB,, | Performed by: PSYCHIATRY & NEUROLOGY

## 2022-06-29 PROCEDURE — 99205 PR OFFICE/OUTPT VISIT, NEW, LEVL V, 60-74 MIN: ICD-10-PCS | Mod: S$GLB,,, | Performed by: PSYCHIATRY & NEUROLOGY

## 2022-06-29 PROCEDURE — 3079F PR MOST RECENT DIASTOLIC BLOOD PRESSURE 80-89 MM HG: ICD-10-PCS | Mod: CPTII,S$GLB,, | Performed by: PSYCHIATRY & NEUROLOGY

## 2022-06-29 PROCEDURE — 99999 PR PBB SHADOW E&M-EST. PATIENT-LVL V: CPT | Mod: PBBFAC,,, | Performed by: PSYCHIATRY & NEUROLOGY

## 2022-06-29 PROCEDURE — 3008F PR BODY MASS INDEX (BMI) DOCUMENTED: ICD-10-PCS | Mod: CPTII,S$GLB,, | Performed by: PSYCHIATRY & NEUROLOGY

## 2022-06-29 NOTE — PROGRESS NOTES
"Subjective:       Patient ID: Anita Street is a 56 y.o. female.    Chief Complaint: Balance and Dizziness          HPI           The patient started having dizziness and disequilibrium after going on a cruise trip to [place] in . During one of the nights there the waves were very strong and she felt anxious (she got off SSRIs due to weight gain).   The patient describes dizziness as "spinning sensation" associated with occasional nausea and brief headache at times triggered by head movement. When she stands up and walks she feels staggering to the LT side. Denies chronic headaches. Staying still and focusing on one object help alleviate the symptoms.  The patient denies hearing loss. The patient also denies ringing and fullness in the ears. No double vision. No slurring of speech. No trouble swallowing or breathing. No weakness or numbness. No history of neck trauma. No history of concussion.  No history of loss of consciousness. No history of strokes.On 03-29-20222 Brain MRI O NL . On 03- [facility]  revealed a right peripheral abnormality (36% right unilateral weakness). LB and DB nystagmus noted during positional testing which reduced with fixation (likely related to peripheral abnormality). All oculomotor function (central) test results were within normal limits.  She is undergoing vestibular rehabilitations at [facility].           Review of Systems   Constitutional: Negative for appetite change and fatigue.   HENT: Negative for hearing loss and tinnitus.         Dry mouth   Eyes: Negative for photophobia and visual disturbance.   Respiratory: Negative for apnea and shortness of breath.    Cardiovascular: Negative for chest pain and palpitations.   Gastrointestinal: Negative for nausea and vomiting.   Endocrine: Negative for cold intolerance and heat intolerance.   Genitourinary: Negative for difficulty urinating and urgency.   Musculoskeletal: Positive for gait problem. Negative for arthralgias, back " pain, joint swelling, myalgias, neck pain and neck stiffness.   Skin: Negative for color change and rash.   Allergic/Immunologic: Negative for environmental allergies and immunocompromised state.   Neurological: Positive for dizziness. Negative for tremors, seizures, syncope, facial asymmetry, speech difficulty, weakness, light-headedness, numbness and headaches.   Hematological: Negative for adenopathy. Does not bruise/bleed easily.   Psychiatric/Behavioral: Positive for dysphoric mood. Negative for agitation, behavioral problems, confusion, decreased concentration, hallucinations, self-injury, sleep disturbance and suicidal ideas. The patient is nervous/anxious. The patient is not hyperactive.                  Current Outpatient Medications:     ALPRAZolam (XANAX) 0.25 MG tablet, Take 1 tablet (0.25 mg total) by mouth 2 (two) times daily as needed (flight anxiety)., Disp: 20 tablet, Rfl: 0    clobetasoL (TEMOVATE) 0.05 % external solution, APPLY SMALL AMOUNT OF SOLUTION TO SCALP HS 1 TO 2 WEEKS THEN PRN FOR FLARE UPS, Disp: , Rfl:     FLUoxetine 20 MG capsule, Take 1 capsule (20 mg total) by mouth once daily., Disp: 90 capsule, Rfl: 1    hydroCHLOROthiazide (HYDRODIURIL) 25 MG tablet, Take 1 tablet (25 mg total) by mouth once daily., Disp: 90 tablet, Rfl: 1    ketoconazole (NIZORAL) 2 % shampoo, , Disp: , Rfl:     scopolamine (TRANSDERM-SCOP) 1.3-1.5 mg (1 mg over 3 days), Place 1 patch onto the skin behind the ear every 3 days as needed, Disp: 3 patch, Rfl: 0    testosterone (ANDROGEL) 1 % (50 mg/5 gram) GlPk, Apply 1 drop topically once daily., Disp: 15 g, Rfl: 3    tretinoin (RETIN-A) 0.025 % cream, , Disp: , Rfl:   Past Medical History:   Diagnosis Date    Anxiety     Depression     GERD (gastroesophageal reflux disease)      Past Surgical History:   Procedure Laterality Date    CHOLECYSTECTOMY      COLONOSCOPY N/A 11/13/2017    Procedure: COLONOSCOPY;  Surgeon: Yina Macias MD;  Location: [facility]  ENDO;  Service: Endoscopy;  Laterality: N/A;    HYSTERECTOMY      severe cervical dysplasia, no cancer, several paps normal since    pelvic lap      Feritility     Social History     Socioeconomic History    Marital status:     Number of children: 2   Tobacco Use    Smoking status: Never Smoker    Smokeless tobacco: Never Used   Substance and Sexual Activity    Alcohol use: Yes     Comment: Rare Occ    Drug use: No    Sexual activity: Yes     Partners: Male           Past/Current Medical/Surgical History, Past/Current Social History, Past/Current Family History and Past/Current Medications were reviewed in detail.        Objective:           VITAL SIGNS WERE REVIEWED      GENERAL APPEARANCE:     The patient looks comfortable.    BMI 27.38     No signs of respiratory distress.    Normal breathing pattern.    No dysmorphic features    Normal eye contact.     GENERAL MEDICAL EXAM:    HEENT:  Head is atraumatic normocephalic. Fundoscopic (Ophthalmoscopic) exam showed no disc edema.      Neck and Axillae: No JVD. No visible lesions.    Cardiopulmonary: No cyanosis. No tachypnea. Normal respiratory effort.    Gastrointestinal/Urogenital:  No jaundice. No stomas or lesions. No visible hernias. No catheters.     Skin, Hair and Nails: No pathognonomic skin rash. No neurofibromatosis. No visible lesions.No stigmata of autoimmune disease. No clubbing.    Limbs: No varicose veins. No visible swelling.    Muskoskeletal: No visible deformities.No visible lesions.           Neurologic Exam     Mental Status   Oriented to person, place, and time.   Follows 3 step commands.   Attention: normal. Concentration: normal.   Speech: speech is normal   Level of consciousness: alert  Able to name object. Able to repeat. Normal comprehension.     Cranial Nerves   Cranial nerves II through XII intact.     CN II   Visual fields full to confrontation.   Visual acuity: normal  Right visual field deficit: none  Left visual field  deficit: none     CN III, IV, VI   Pupils are equal, round, and reactive to light.  Extraocular motions are normal.   Right pupil: Size: 2 mm. Shape: regular. Reactivity: brisk. Consensual response: intact. Accommodation: intact.   Left pupil: Size: 2 mm. Shape: regular. Reactivity: brisk. Consensual response: intact. Accommodation: intact.   CN III: no CN III palsy  CN VI: no CN VI palsy  Nystagmus: none   Diplopia: none  Ophthalmoparesis: none  Upgaze: normal  Downgaze: normal  Conjugate gaze: present  Vestibulo-ocular reflex: present    CN V   Facial sensation intact.   Right facial sensation deficit: none  Left facial sensation deficit: none  Jaw jerk: normal    CN VII   Facial expression full, symmetric.   Right facial weakness: none  Left facial weakness: none    CN VIII   CN VIII normal.   Hearing: intact    CN IX, X   CN IX normal.   CN X normal.   Palate: symmetric    CN XI   CN XI normal.   Right sternocleidomastoid strength: normal  Left sternocleidomastoid strength: normal  Right trapezius strength: normal  Left trapezius strength: normal    CN XII   CN XII normal.   Tongue: not atrophic  Fasciculations: absent  Tongue deviation: none    Saccadic intrusions noted      Motor Exam   Muscle bulk: normal  Overall muscle tone: normal  Right arm tone: normal  Left arm tone: normal  Right arm pronator drift: absent  Left arm pronator drift: absent  Right leg tone: normal  Left leg tone: normal    Strength   Strength 5/5 throughout.   Right neck flexion: 5/5  Left neck flexion: 5/5  Right neck extension: 5/5  Left neck extension: 5/5  Right deltoid: 5/5  Left deltoid: 5/5  Right biceps: 5/5  Left biceps: 5/5  Right triceps: 5/5  Left triceps: 5/5  Right wrist flexion: 5/5  Left wrist flexion: 5/5  Right wrist extension: 5/5  Left wrist extension: 5/5  Right interossei: 5/5  Left interossei: 5/5  Right iliopsoas: 5/5  Left iliopsoas: 5/5  Right quadriceps: 5/5  Left quadriceps: 5/5  Right hamstrin/5  Left  hamstrin/5  Right glutei: 5/5  Left glutei: 5/5  Right anterior tibial: 5/5  Left anterior tibial: 5/5  Right posterior tibial: 5/5  Left posterior tibial: 5/5  Right peroneal: 5/5  Left peroneal: 5/5  Right gastroc: 5/5  Left gastroc: 5/5    Sensory Exam   Light touch normal.   Right arm light touch: normal  Left arm light touch: normal  Right leg light touch: normal  Left leg light touch: normal  Vibration normal.   Right arm vibration: normal  Left arm vibration: normal  Right leg vibration: normal  Left leg vibration: normal  Proprioception normal.   Right arm proprioception: normal  Left arm proprioception: normal  Right leg proprioception: normal  Left leg proprioception: normal  Pinprick normal.   Right arm pinprick: normal  Left arm pinprick: normal  Right leg pinprick: normal  Left leg pinprick: normal  Graphesthesia: normal  Stereognosis: normal    Gait, Coordination, and Reflexes     Gait  Gait: normal    Coordination   Romberg: negative  Finger to nose coordination: normal  Heel to shin coordination: normal  Tandem walking coordination: normal    Tremor   Resting tremor: absent  Intention tremor: absent  Action tremor: absent    Reflexes   Right brachioradialis: 2+  Left brachioradialis: 2+  Right biceps: 2+  Left biceps: 2+  Right triceps: 2+  Left triceps: 2+  Right patellar: 2+  Left patellar: 2+  Right achilles: 2+  Left achilles: 2+  Right plantar: normal  Left plantar: normal  Right Rinaldi: absent  Left Rinaldi: absent  Right ankle clonus: absent  Left ankle clonus: absent  Right pendular knee jerk: absent  Left pendular knee jerk: absent      Lab Results   Component Value Date    WBC 12.09 2022    HGB 13.8 2022    HCT 42.0 2022    MCV 90 2022     2022     Sodium   Date Value Ref Range Status   2022 140 136 - 145 mmol/L Final     Potassium   Date Value Ref Range Status   2022 3.7 3.5 - 5.1 mmol/L Final     Chloride   Date Value Ref Range Status    04/16/2022 98 95 - 110 mmol/L Final     CO2   Date Value Ref Range Status   04/16/2022 27 23 - 29 mmol/L Final     Glucose   Date Value Ref Range Status   04/16/2022 107 70 - 110 mg/dL Final     BUN   Date Value Ref Range Status   04/16/2022 18 6 - 20 mg/dL Final     Creatinine   Date Value Ref Range Status   04/16/2022 0.9 0.5 - 1.4 mg/dL Final     Calcium   Date Value Ref Range Status   04/16/2022 9.9 8.7 - 10.5 mg/dL Final     Total Protein   Date Value Ref Range Status   04/16/2022 7.2 6.0 - 8.4 g/dL Final     Albumin   Date Value Ref Range Status   04/16/2022 4.3 3.5 - 5.2 g/dL Final     Total Bilirubin   Date Value Ref Range Status   04/16/2022 0.5 0.1 - 1.0 mg/dL Final     Comment:     For infants and newborns, interpretation of results should be based  on gestational age, weight and in agreement with clinical  observations.    Premature Infant recommended reference ranges:  Up to 24 hours.............<8.0 mg/dL  Up to 48 hours............<12.0 mg/dL  3-5 days..................<15.0 mg/dL  6-29 days.................<15.0 mg/dL       Alkaline Phosphatase   Date Value Ref Range Status   04/16/2022 51 (L) 55 - 135 U/L Final     AST   Date Value Ref Range Status   04/16/2022 17 10 - 40 U/L Final     ALT   Date Value Ref Range Status   04/16/2022 18 10 - 44 U/L Final     Anion Gap   Date Value Ref Range Status   04/16/2022 15 8 - 16 mmol/L Final     eGFR if    Date Value Ref Range Status   04/16/2022 >60 >60 mL/min/1.73 m^2 Final     eGFR if non    Date Value Ref Range Status   04/16/2022 >60 >60 mL/min/1.73 m^2 Final     Comment:     Calculation used to obtain the estimated glomerular filtration  rate (eGFR) is the CKD-EPI equation.        No results found for: [id number]  Lab Results   Component Value Date    TSH 1.852 04/16/2022     Labs Reviewed:     07-    Thiamine level 51 NL (), Anti SSA and SSB -ve    Labs WNL   03-29-20222    Brain MRI [facility] NL      03-    [facility]  revealed a right peripheral abnormality (36% right unilateral weakness). LB and DB nystagmus noted during positional testing which reduced with fixation (likely related to peripheral abnormality). All oculomotor function (central) test results were within normal limits.      Reviewed the neuroimaging independently       Assessment:       1. Vestibular disequilibrium, unspecified laterality    2. Mal de debarquement    3. Vertigo    4. Generalized anxiety disorder    5. Depression, unspecified depression type    6. Persistent postural-perceptual dizziness    7. Abnormality of gait and mobility    8. Dizziness        Plan:                   VERTIGO AND DISEQUILIBRIUM, PERIPHERAL (VESTIBULOPATHY)    PROABLE MAL DE DEBARQUEMENT SYNDROME (MdDS), LESS LIKELY P3D WITH MADDIE    NO EVIDENCE OF CENTRAL PATHOLOGY         Check Vitamin B1, SS-A, SS-B level.     C-Spine MRI WWO.    Neuro-otology evaluation.    Might benefit from  CBT-SSRIs, Benzodiazepines, Vestibular therapy and full-field visual stimulus while the head is rolled                  MEDICAL/SURGICAL COMORBIDITIES     All relevant medical comorbidities noted and managed by primary care physician and medical care team.          HEALTHY LIFESTYLE AND PREVENTATIVE CARE    The patient to adhere to the age-appropriate health maintenance guidelines including screening tests and vaccinations. The patient to adhere to  healthy lifestyle, optimal weight, exercise, healthy diet, good sleep hygiene and avoiding drugs including smoking, alcohol and recreational drugs.          Eliana Feng MD, FAAN    Attending Neurologist/Epileptologist         Diplomate, American Board of Psychiatry and Neurology    Diplomate, American Board of Clinical Neurophysiology     Fellow, American Academy of Neurology             I spent a total of 102 minutes on the day of the visit.  This includes face to face time and non-face to face time preparing to see the patient (eg,  review of tests), obtaining and/or reviewing separately obtained history, documenting clinical information in the electronic or other health record, independently interpreting results and communicating results to the patient/family/caregiver, or care coordinator.

## 2022-07-11 ENCOUNTER — PATIENT MESSAGE (OUTPATIENT)
Dept: INTERNAL MEDICINE | Facility: CLINIC | Age: 57
End: 2022-07-11
Payer: COMMERCIAL

## 2022-07-11 RX ORDER — BUPROPION HYDROCHLORIDE 150 MG/1
150 TABLET ORAL DAILY
Qty: 30 TABLET | Refills: 6 | Status: SHIPPED | OUTPATIENT
Start: 2022-07-11 | End: 2023-01-17

## 2022-07-12 ENCOUNTER — PATIENT MESSAGE (OUTPATIENT)
Dept: NEUROLOGY | Facility: CLINIC | Age: 57
End: 2022-07-12
Payer: COMMERCIAL

## 2022-07-12 DIAGNOSIS — R42 DIZZINESS: Primary | ICD-10-CM

## 2022-07-14 ENCOUNTER — LAB VISIT (OUTPATIENT)
Dept: LAB | Facility: HOSPITAL | Age: 57
End: 2022-07-14
Attending: PSYCHIATRY & NEUROLOGY
Payer: COMMERCIAL

## 2022-07-14 DIAGNOSIS — R42 DIZZINESS: ICD-10-CM

## 2022-07-14 PROCEDURE — 86235 NUCLEAR ANTIGEN ANTIBODY: CPT | Performed by: PSYCHIATRY & NEUROLOGY

## 2022-07-14 PROCEDURE — 84425 ASSAY OF VITAMIN B-1: CPT | Performed by: PSYCHIATRY & NEUROLOGY

## 2022-07-14 PROCEDURE — 36415 COLL VENOUS BLD VENIPUNCTURE: CPT | Performed by: PSYCHIATRY & NEUROLOGY

## 2022-07-14 PROCEDURE — 86235 NUCLEAR ANTIGEN ANTIBODY: CPT | Mod: 59 | Performed by: PSYCHIATRY & NEUROLOGY

## 2022-07-18 LAB
ANTI-SSA ANTIBODY: 0.04 RATIO (ref 0–0.99)
ANTI-SSA INTERPRETATION: NEGATIVE
ANTI-SSB ANTIBODY: 0.08 RATIO (ref 0–0.99)
ANTI-SSB INTERPRETATION: NEGATIVE

## 2022-07-19 LAB — VIT B1 BLD-MCNC: 51 UG/L (ref 38–122)

## 2022-07-20 ENCOUNTER — TELEPHONE (OUTPATIENT)
Dept: NEUROLOGY | Facility: CLINIC | Age: 57
End: 2022-07-20
Payer: COMMERCIAL

## 2022-07-20 NOTE — TELEPHONE ENCOUNTER
----- Message from Chary Montilla NP sent at 7/19/2022  7:35 PM CDT -----  Labs Reviewed:     07-    Thiamine level 51 NL (), Anti SSA and SSB -ve    Labs WNL

## 2022-07-25 ENCOUNTER — PATIENT MESSAGE (OUTPATIENT)
Dept: PHARMACY | Facility: CLINIC | Age: 57
End: 2022-07-25
Payer: COMMERCIAL

## 2022-08-09 ENCOUNTER — PATIENT MESSAGE (OUTPATIENT)
Dept: ADMINISTRATIVE | Facility: HOSPITAL | Age: 57
End: 2022-08-09
Payer: COMMERCIAL

## 2022-08-29 ENCOUNTER — OFFICE VISIT (OUTPATIENT)
Dept: INTERNAL MEDICINE | Facility: CLINIC | Age: 57
End: 2022-08-29
Payer: COMMERCIAL

## 2022-08-29 VITALS
DIASTOLIC BLOOD PRESSURE: 80 MMHG | SYSTOLIC BLOOD PRESSURE: 118 MMHG | HEART RATE: 73 BPM | HEIGHT: 69 IN | WEIGHT: 184.31 LBS | TEMPERATURE: 98 F | BODY MASS INDEX: 27.3 KG/M2 | OXYGEN SATURATION: 97 %

## 2022-08-29 DIAGNOSIS — F41.1 GAD (GENERALIZED ANXIETY DISORDER): ICD-10-CM

## 2022-08-29 DIAGNOSIS — I10 ESSENTIAL HYPERTENSION: ICD-10-CM

## 2022-08-29 DIAGNOSIS — Z00.00 ANNUAL PHYSICAL EXAM: Primary | ICD-10-CM

## 2022-08-29 DIAGNOSIS — Z12.11 COLON CANCER SCREENING: ICD-10-CM

## 2022-08-29 DIAGNOSIS — R68.2 DRY MOUTH: ICD-10-CM

## 2022-08-29 PROCEDURE — 99999 PR PBB SHADOW E&M-EST. PATIENT-LVL IV: ICD-10-PCS | Mod: PBBFAC,,, | Performed by: FAMILY MEDICINE

## 2022-08-29 PROCEDURE — 99396 PR PREVENTIVE VISIT,EST,40-64: ICD-10-PCS | Mod: S$GLB,,, | Performed by: FAMILY MEDICINE

## 2022-08-29 PROCEDURE — 3079F PR MOST RECENT DIASTOLIC BLOOD PRESSURE 80-89 MM HG: ICD-10-PCS | Mod: CPTII,S$GLB,, | Performed by: FAMILY MEDICINE

## 2022-08-29 PROCEDURE — 3008F PR BODY MASS INDEX (BMI) DOCUMENTED: ICD-10-PCS | Mod: CPTII,S$GLB,, | Performed by: FAMILY MEDICINE

## 2022-08-29 PROCEDURE — 3074F SYST BP LT 130 MM HG: CPT | Mod: CPTII,S$GLB,, | Performed by: FAMILY MEDICINE

## 2022-08-29 PROCEDURE — 1160F RVW MEDS BY RX/DR IN RCRD: CPT | Mod: CPTII,S$GLB,, | Performed by: FAMILY MEDICINE

## 2022-08-29 PROCEDURE — 3079F DIAST BP 80-89 MM HG: CPT | Mod: CPTII,S$GLB,, | Performed by: FAMILY MEDICINE

## 2022-08-29 PROCEDURE — 3074F PR MOST RECENT SYSTOLIC BLOOD PRESSURE < 130 MM HG: ICD-10-PCS | Mod: CPTII,S$GLB,, | Performed by: FAMILY MEDICINE

## 2022-08-29 PROCEDURE — 1159F PR MEDICATION LIST DOCUMENTED IN MEDICAL RECORD: ICD-10-PCS | Mod: CPTII,S$GLB,, | Performed by: FAMILY MEDICINE

## 2022-08-29 PROCEDURE — 3008F BODY MASS INDEX DOCD: CPT | Mod: CPTII,S$GLB,, | Performed by: FAMILY MEDICINE

## 2022-08-29 PROCEDURE — 99396 PREV VISIT EST AGE 40-64: CPT | Mod: S$GLB,,, | Performed by: FAMILY MEDICINE

## 2022-08-29 PROCEDURE — 99999 PR PBB SHADOW E&M-EST. PATIENT-LVL IV: CPT | Mod: PBBFAC,,, | Performed by: FAMILY MEDICINE

## 2022-08-29 PROCEDURE — 1160F PR REVIEW ALL MEDS BY PRESCRIBER/CLIN PHARMACIST DOCUMENTED: ICD-10-PCS | Mod: CPTII,S$GLB,, | Performed by: FAMILY MEDICINE

## 2022-08-29 PROCEDURE — 1159F MED LIST DOCD IN RCRD: CPT | Mod: CPTII,S$GLB,, | Performed by: FAMILY MEDICINE

## 2022-08-29 RX ORDER — OMEPRAZOLE 40 MG/1
40 CAPSULE, DELAYED RELEASE ORAL DAILY
Qty: 90 CAPSULE | Refills: 1 | Status: SHIPPED | OUTPATIENT
Start: 2022-08-29 | End: 2023-08-04

## 2022-08-29 NOTE — PROGRESS NOTES
"Subjective:      Patient ID: Anita Street is a 56 y.o. female.    Chief Complaint: Annual Exam    HPI  55 yo with MADDIE here for annual.  Been suffering with bad dizziness.  Saw several specialists//got in with [facility].  Will be seeing Psychiatry and Psychology soon as well.  Suffering with dry mouth//had some labs that were negative.    She wonders if perhaps some menopause effect.  Dr. Tillman did send her some HRT.  Took one month and didn't refill.    Due for colon in Nov  Needs to update mammo    Past Medical History:   Diagnosis Date    Anxiety     Depression     GERD (gastroesophageal reflux disease)      Family History   Problem Relation Age of Onset    Anxiety disorder Mother     Depression Mother     Diabetes Mother     Diabetes Father     Hypertension Father     Heart disease Father     Prostate cancer Father     Cataracts Daughter     Hyperlipidemia Brother     Hypertension Brother     Heart disease Brother     Strabismus Daughter     Colon cancer Neg Hx     Breast cancer Neg Hx      Past Surgical History:   Procedure Laterality Date    CHOLECYSTECTOMY      COLONOSCOPY N/A 11/13/2017    Procedure: COLONOSCOPY;  Surgeon: Yina Macias MD;  Location: [facility];  Service: Endoscopy;  Laterality: N/A;    HYSTERECTOMY      severe cervical dysplasia, no cancer, several paps normal since    pelvic lap      Feritility     Social History     Tobacco Use    Smoking status: Never    Smokeless tobacco: Never   Substance Use Topics    Alcohol use: Yes     Comment: Rare Occ    Drug use: No       /80   Pulse 73   Temp 97.9 °F (36.6 °C)   Ht 5' 9" (1.753 m)   Wt 83.6 kg (184 lb 4.9 oz)   LMP  (LMP Unknown)   SpO2 97%   BMI 27.22 kg/m²     Review of Systems   Constitutional:  Negative for activity change, appetite change, chills, diaphoresis, fatigue, fever and unexpected weight change.   HENT:  Negative for ear pain, hearing loss, postnasal drip, rhinorrhea and tinnitus.    Eyes:  Negative for visual " disturbance.   Respiratory:  Negative for cough, shortness of breath and wheezing.    Cardiovascular:  Negative for chest pain, palpitations and leg swelling.   Gastrointestinal:  Negative for abdominal distention and abdominal pain.   Genitourinary:  Negative for dysuria, frequency, hematuria and urgency.   Musculoskeletal:  Negative for back pain and joint swelling.   Neurological:  Negative for weakness and headaches.   Hematological:  Negative for adenopathy.   Psychiatric/Behavioral:  Negative for confusion and decreased concentration. The patient is nervous/anxious.      Objective:     Physical Exam  Vitals and nursing note reviewed.   Constitutional:       General: She is not in acute distress.     Appearance: She is well-developed.   HENT:      Right Ear: External ear normal.      Left Ear: External ear normal.      Nose: Nose normal.   Eyes:      Conjunctiva/sclera: Conjunctivae normal.      Pupils: Pupils are equal, round, and reactive to light.   Neck:      Thyroid: No thyromegaly.   Cardiovascular:      Rate and Rhythm: Normal rate and regular rhythm.      Heart sounds: Normal heart sounds. No murmur heard.    No gallop.   Pulmonary:      Effort: Pulmonary effort is normal. No respiratory distress.      Breath sounds: Normal breath sounds. No wheezing or rales.   Abdominal:      General: Bowel sounds are normal. There is no distension.      Palpations: Abdomen is soft.      Tenderness: There is no abdominal tenderness. There is no guarding.   Musculoskeletal:         General: Normal range of motion.      Cervical back: Normal range of motion and neck supple.      Right lower leg: No edema.      Left lower leg: No edema.   Skin:     General: Skin is warm and dry.      Findings: No rash.   Neurological:      Mental Status: She is alert and oriented to person, place, and time.      Cranial Nerves: No cranial nerve deficit.   Psychiatric:         Behavior: Behavior normal.         Thought Content: Thought  content normal.         Judgment: Judgment normal.       Lab Results   Component Value Date    WBC 12.09 04/16/2022    HGB 13.8 04/16/2022    HCT 42.0 04/16/2022     04/16/2022    CHOL 181 06/07/2021    TRIG 117 06/07/2021    HDL 47 06/07/2021    ALT 18 04/16/2022    AST 17 04/16/2022     04/16/2022    K 3.7 04/16/2022    CL 98 04/16/2022    CREATININE 0.9 04/16/2022    BUN 18 04/16/2022    CO2 27 04/16/2022    TSH 1.852 04/16/2022    HGBA1C 5.6 06/07/2021       Assessment:     1. Annual physical exam    2. Colon cancer screening    3. MADDIE (generalized anxiety disorder)    4. Essential hypertension    5. Dry mouth         Plan:     Annual physical exam    Colon cancer screening  -     Ambulatory referral/consult to Endo Procedure ; Future; Expected date: 11/01/2022    MADDIE (generalized anxiety disorder)    Essential hypertension    Dry mouth    Other orders  -     omeprazole (PRILOSEC) 40 MG capsule; Take 1 capsule (40 mg total) by mouth once daily.  Dispense: 90 capsule; Refill: 1    Send PPI//has hiatal hernia//actus up on occasion  Cont meds  Update labs  Update mammo  Update colon  Cont with specialty  F/u annually and PRN

## 2022-08-31 ENCOUNTER — HOSPITAL ENCOUNTER (OUTPATIENT)
Dept: PREADMISSION TESTING | Facility: HOSPITAL | Age: 57
Discharge: HOME | End: 2022-08-31
Attending: FAMILY MEDICINE
Payer: COMMERCIAL

## 2022-08-31 DIAGNOSIS — Z12.11 COLON CANCER SCREENING: ICD-10-CM

## 2022-10-04 ENCOUNTER — PATIENT MESSAGE (OUTPATIENT)
Dept: ADMINISTRATIVE | Facility: HOSPITAL | Age: 57
End: 2022-10-04
Payer: COMMERCIAL

## 2022-10-05 ENCOUNTER — PATIENT MESSAGE (OUTPATIENT)
Dept: OBSTETRICS AND GYNECOLOGY | Facility: CLINIC | Age: 57
End: 2022-10-05
Payer: COMMERCIAL

## 2022-10-05 DIAGNOSIS — N95.1 MENOPAUSE SYNDROME: ICD-10-CM

## 2022-10-05 DIAGNOSIS — R68.82 LOW LIBIDO: ICD-10-CM

## 2022-10-06 RX ORDER — SEMAGLUTIDE 2.68 MG/ML
2 INJECTION, SOLUTION SUBCUTANEOUS
Qty: 3 ML | Refills: 6 | Status: SHIPPED | OUTPATIENT
Start: 2022-10-06 | End: 2022-12-01 | Stop reason: CLARIF

## 2022-10-06 RX ORDER — ESTRADIOL 1 MG/1
1 TABLET ORAL DAILY
Qty: 90 TABLET | Refills: 3 | Status: SHIPPED | OUTPATIENT
Start: 2022-10-06 | End: 2024-01-23

## 2022-10-06 RX ORDER — TESTOSTERONE GEL, 1% 10 MG/G
1 GEL TRANSDERMAL DAILY
Qty: 15 G | Refills: 3 | Status: SHIPPED | OUTPATIENT
Start: 2022-10-06 | End: 2022-12-22 | Stop reason: SDUPTHER

## 2022-10-11 ENCOUNTER — HOSPITAL ENCOUNTER (OUTPATIENT)
Dept: RADIOLOGY | Facility: HOSPITAL | Age: 57
Discharge: HOME | End: 2022-10-11
Attending: OBSTETRICS & GYNECOLOGY
Payer: COMMERCIAL

## 2022-10-11 DIAGNOSIS — Z12.31 ENCOUNTER FOR SCREENING MAMMOGRAM FOR MALIGNANT NEOPLASM OF BREAST: ICD-10-CM

## 2022-10-11 PROCEDURE — 77063 BREAST TOMOSYNTHESIS BI: CPT | Mod: 26,,, | Performed by: RADIOLOGY

## 2022-10-11 PROCEDURE — 77067 MAMMO DIGITAL SCREENING BILAT WITH TOMO: ICD-10-PCS | Mod: 26,,, | Performed by: RADIOLOGY

## 2022-10-11 PROCEDURE — 77067 SCR MAMMO BI INCL CAD: CPT | Mod: 26,,, | Performed by: RADIOLOGY

## 2022-10-11 PROCEDURE — 77063 MAMMO DIGITAL SCREENING BILAT WITH TOMO: ICD-10-PCS | Mod: 26,,, | Performed by: RADIOLOGY

## 2022-10-11 PROCEDURE — 77067 SCR MAMMO BI INCL CAD: CPT | Mod: TC,PN

## 2022-10-11 PROCEDURE — 77063 BREAST TOMOSYNTHESIS BI: CPT | Mod: TC,PN

## 2022-10-19 RX ORDER — SEMAGLUTIDE 1.34 MG/ML
0.25 INJECTION, SOLUTION SUBCUTANEOUS
Qty: 1 PEN | Refills: 0 | Status: SHIPPED | OUTPATIENT
Start: 2022-10-19 | End: 2022-11-19

## 2022-10-20 ENCOUNTER — LAB VISIT (OUTPATIENT)
Dept: LAB | Facility: HOSPITAL | Age: 57
End: 2022-10-20
Attending: FAMILY MEDICINE
Payer: COMMERCIAL

## 2022-10-20 DIAGNOSIS — I10 ESSENTIAL HYPERTENSION: ICD-10-CM

## 2022-10-20 DIAGNOSIS — Z00.00 ANNUAL PHYSICAL EXAM: ICD-10-CM

## 2022-10-20 LAB
ANION GAP SERPL CALC-SCNC: 12 MMOL/L (ref 8–16)
BUN SERPL-MCNC: 13 MG/DL (ref 6–20)
CALCIUM SERPL-MCNC: 9.1 MG/DL (ref 8.7–10.5)
CHLORIDE SERPL-SCNC: 104 MMOL/L (ref 95–110)
CO2 SERPL-SCNC: 25 MMOL/L (ref 23–29)
CREAT SERPL-MCNC: 0.9 MG/DL (ref 0.5–1.4)
EST. GFR  (NO RACE VARIABLE): >60 ML/MIN/1.73 M^2
GLUCOSE SERPL-MCNC: 81 MG/DL (ref 70–110)
POTASSIUM SERPL-SCNC: 3.2 MMOL/L (ref 3.5–5.1)
SODIUM SERPL-SCNC: 141 MMOL/L (ref 136–145)

## 2022-10-20 PROCEDURE — 83036 HEMOGLOBIN GLYCOSYLATED A1C: CPT | Performed by: FAMILY MEDICINE

## 2022-10-20 PROCEDURE — 80048 BASIC METABOLIC PNL TOTAL CA: CPT | Performed by: FAMILY MEDICINE

## 2022-10-20 PROCEDURE — 36415 COLL VENOUS BLD VENIPUNCTURE: CPT | Performed by: FAMILY MEDICINE

## 2022-10-20 PROCEDURE — 80061 LIPID PANEL: CPT | Performed by: FAMILY MEDICINE

## 2022-10-21 LAB
CHOLEST SERPL-MCNC: 152 MG/DL (ref 120–199)
CHOLEST/HDLC SERPL: 3.1 {RATIO} (ref 2–5)
ESTIMATED AVG GLUCOSE: 103 MG/DL (ref 68–131)
HBA1C MFR BLD: 5.2 % (ref 4–5.6)
HDLC SERPL-MCNC: 49 MG/DL (ref 40–75)
HDLC SERPL: 32.2 % (ref 20–50)
LDLC SERPL CALC-MCNC: 86.8 MG/DL (ref 63–159)
NONHDLC SERPL-MCNC: 103 MG/DL
TRIGL SERPL-MCNC: 81 MG/DL (ref 30–150)

## 2022-12-01 RX ORDER — SEMAGLUTIDE 1.34 MG/ML
1 INJECTION, SOLUTION SUBCUTANEOUS
Qty: 1 PEN | Refills: 2 | Status: SHIPPED | OUTPATIENT
Start: 2022-12-01 | End: 2023-07-06

## 2022-12-04 ENCOUNTER — PATIENT MESSAGE (OUTPATIENT)
Dept: NEUROLOGY | Facility: CLINIC | Age: 57
End: 2022-12-04
Payer: COMMERCIAL

## 2022-12-05 DIAGNOSIS — R26.9 ABNORMALITY OF GAIT AND MOBILITY: ICD-10-CM

## 2022-12-05 DIAGNOSIS — H83.2X9 VESTIBULAR DISEQUILIBRIUM, UNSPECIFIED LATERALITY: ICD-10-CM

## 2022-12-05 DIAGNOSIS — R42 VERTIGO: Primary | ICD-10-CM

## 2022-12-22 ENCOUNTER — OFFICE VISIT (OUTPATIENT)
Dept: OBSTETRICS AND GYNECOLOGY | Facility: CLINIC | Age: 57
End: 2022-12-22
Payer: COMMERCIAL

## 2022-12-22 VITALS
WEIGHT: 181.69 LBS | HEIGHT: 69 IN | DIASTOLIC BLOOD PRESSURE: 68 MMHG | SYSTOLIC BLOOD PRESSURE: 112 MMHG | BODY MASS INDEX: 26.91 KG/M2

## 2022-12-22 DIAGNOSIS — N95.1 MENOPAUSE SYNDROME: ICD-10-CM

## 2022-12-22 DIAGNOSIS — R68.82 LOW LIBIDO: ICD-10-CM

## 2022-12-22 DIAGNOSIS — Z87.410 HISTORY OF CERVICAL DYSPLASIA: ICD-10-CM

## 2022-12-22 DIAGNOSIS — Z01.419 WELL WOMAN EXAM WITH ROUTINE GYNECOLOGICAL EXAM: Primary | ICD-10-CM

## 2022-12-22 DIAGNOSIS — Z12.11 COLON CANCER SCREENING: ICD-10-CM

## 2022-12-22 PROCEDURE — 3074F PR MOST RECENT SYSTOLIC BLOOD PRESSURE < 130 MM HG: ICD-10-PCS | Mod: CPTII,S$GLB,, | Performed by: OBSTETRICS & GYNECOLOGY

## 2022-12-22 PROCEDURE — 99396 PREV VISIT EST AGE 40-64: CPT | Mod: S$GLB,,, | Performed by: OBSTETRICS & GYNECOLOGY

## 2022-12-22 PROCEDURE — 3008F BODY MASS INDEX DOCD: CPT | Mod: CPTII,S$GLB,, | Performed by: OBSTETRICS & GYNECOLOGY

## 2022-12-22 PROCEDURE — 1160F PR REVIEW ALL MEDS BY PRESCRIBER/CLIN PHARMACIST DOCUMENTED: ICD-10-PCS | Mod: CPTII,S$GLB,, | Performed by: OBSTETRICS & GYNECOLOGY

## 2022-12-22 PROCEDURE — 1159F PR MEDICATION LIST DOCUMENTED IN MEDICAL RECORD: ICD-10-PCS | Mod: CPTII,S$GLB,, | Performed by: OBSTETRICS & GYNECOLOGY

## 2022-12-22 PROCEDURE — 99396 PR PREVENTIVE VISIT,EST,40-64: ICD-10-PCS | Mod: S$GLB,,, | Performed by: OBSTETRICS & GYNECOLOGY

## 2022-12-22 PROCEDURE — 3044F PR MOST RECENT HEMOGLOBIN A1C LEVEL <7.0%: ICD-10-PCS | Mod: CPTII,S$GLB,, | Performed by: OBSTETRICS & GYNECOLOGY

## 2022-12-22 PROCEDURE — 3078F DIAST BP <80 MM HG: CPT | Mod: CPTII,S$GLB,, | Performed by: OBSTETRICS & GYNECOLOGY

## 2022-12-22 PROCEDURE — 99999 PR PBB SHADOW E&M-EST. PATIENT-LVL III: CPT | Mod: PBBFAC,,, | Performed by: OBSTETRICS & GYNECOLOGY

## 2022-12-22 PROCEDURE — 3078F PR MOST RECENT DIASTOLIC BLOOD PRESSURE < 80 MM HG: ICD-10-PCS | Mod: CPTII,S$GLB,, | Performed by: OBSTETRICS & GYNECOLOGY

## 2022-12-22 PROCEDURE — 3074F SYST BP LT 130 MM HG: CPT | Mod: CPTII,S$GLB,, | Performed by: OBSTETRICS & GYNECOLOGY

## 2022-12-22 PROCEDURE — 1159F MED LIST DOCD IN RCRD: CPT | Mod: CPTII,S$GLB,, | Performed by: OBSTETRICS & GYNECOLOGY

## 2022-12-22 PROCEDURE — 88175 CYTOPATH C/V AUTO FLUID REDO: CPT | Performed by: OBSTETRICS & GYNECOLOGY

## 2022-12-22 PROCEDURE — 1160F RVW MEDS BY RX/DR IN RCRD: CPT | Mod: CPTII,S$GLB,, | Performed by: OBSTETRICS & GYNECOLOGY

## 2022-12-22 PROCEDURE — 3008F PR BODY MASS INDEX (BMI) DOCUMENTED: ICD-10-PCS | Mod: CPTII,S$GLB,, | Performed by: OBSTETRICS & GYNECOLOGY

## 2022-12-22 PROCEDURE — 3044F HG A1C LEVEL LT 7.0%: CPT | Mod: CPTII,S$GLB,, | Performed by: OBSTETRICS & GYNECOLOGY

## 2022-12-22 PROCEDURE — 99999 PR PBB SHADOW E&M-EST. PATIENT-LVL III: ICD-10-PCS | Mod: PBBFAC,,, | Performed by: OBSTETRICS & GYNECOLOGY

## 2022-12-22 RX ORDER — TESTOSTERONE GEL, 1% 10 MG/G
1 GEL TRANSDERMAL DAILY
Qty: 15 G | Refills: 3 | Status: SHIPPED | OUTPATIENT
Start: 2022-12-22

## 2022-12-22 RX ORDER — TRAZODONE HYDROCHLORIDE 50 MG/1
50 TABLET ORAL
COMMUNITY
Start: 2022-09-29 | End: 2022-12-28

## 2022-12-22 NOTE — PROGRESS NOTES
Subjective:       Patient ID: Anita Street is a 57 y.o. female.    Chief Complaint:  Annual Exam      History of Present Illness  HPI  Presents for well-woman exam.  Pt w/hx of hysterectomy about 20 years ago for severe cervical dysplasia.  Routine pap screening afterwards has remained normal.  Needs pap today.  Still has both ovaries.  Is now on estradiol 1mg HRT.  This has helped with vaginal dryness.  Not due for refills.  Needs refills of androgel that she uses for low libido.  Pt and her  are currently raising 2 of their grandchildren, and the extra stress related to that has affected sex drive for both of them.  MMG: 10/2022: normal  Colonoscopy: 2017: repeat due this year; pt requests referral    GYN & OB History  No LMP recorded (lmp unknown). Patient has had a hysterectomy.   Date of Last Pap: 2022    OB History    Para Term  AB Living   3 2 2   1     SAB IAB Ectopic Multiple Live Births   1              # Outcome Date GA Lbr Ovidio/2nd Weight Sex Delivery Anes PTL Lv   3 SAB            2 Term      Vag-Spont      1 Term      Vag-Spont          Review of Systems  Review of Systems   Constitutional:  Negative for fatigue, fever and unexpected weight change.   Gastrointestinal:  Negative for abdominal pain, bloating, blood in stool, constipation, diarrhea, nausea and vomiting.   Endocrine: Negative for hot flashes.   Genitourinary:  Positive for decreased libido and vaginal dryness (improved with estradiol 1mg HRT). Negative for dyspareunia, dysuria, flank pain, frequency, genital sores, hematuria, pelvic pain, urgency, vaginal bleeding, vaginal discharge, vaginal pain, urinary incontinence, postcoital bleeding, postmenopausal bleeding and vaginal odor.   Integumentary:  Negative for rash, hair changes, breast mass, nipple discharge and breast skin changes.   Psychiatric/Behavioral:  Negative for depression. The patient is not nervous/anxious.    Breast: Negative for mass,  mastodynia, nipple discharge and skin changes        Objective:    Physical Exam:   Constitutional: She is oriented to person, place, and time. She appears well-developed and well-nourished. No distress.      Neck: No thyromegaly present.     Pulmonary/Chest: Right breast exhibits no inverted nipple, no mass, no nipple discharge, no skin change, no tenderness, no bleeding and no swelling. Left breast exhibits no inverted nipple, no mass, no nipple discharge, no skin change, no tenderness, no bleeding and no swelling. Breasts are symmetrical.        Abdominal: Soft. She exhibits no distension and no mass. There is no abdominal tenderness. There is no rebound and no guarding. Hernia confirmed negative in the right inguinal area and confirmed negative in the left inguinal area.     Genitourinary:    Vagina normal.      Pelvic exam was performed with patient supine.   There is no rash, tenderness, lesion or injury on the right labia. There is no rash, tenderness, lesion or injury on the left labia. Right adnexum displays no mass, no tenderness and no fullness. Left adnexum displays no mass, no tenderness and no fullness. No erythema,  no vaginal discharge, tenderness, bleeding, rectocele, cystocele or unspecified prolapse of vaginal walls in the vagina.    No foreign body in the vagina.      No signs of injury in the vagina.   Cervix is absent.Uterus is absent.               Neurological: She is alert and oriented to person, place, and time.     Psychiatric: She has a normal mood and affect.        Assessment:        1. Well woman exam with routine gynecological exam    2. Menopause syndrome    3. Low libido    4. History of cervical dysplasia    5. Colon cancer screening                Plan:      Anita was seen today for annual exam.    Diagnoses and all orders for this visit:    Well woman exam with routine gynecological exam    Menopause syndrome  -     testosterone (ANDROGEL) 1 % (50 mg/5 gram) GlPk; Apply 1  drop topically once daily.    Low libido  -     testosterone (ANDROGEL) 1 % (50 mg/5 gram) GlPk; Apply 1 drop topically once daily.    History of cervical dysplasia  -     Liquid-Based Pap Smear, Screening    Colon cancer screening  -     Case Request Endoscopy: COLONOSCOPY     Pap of cuff performed.  RTC 1 year or sooner prn.

## 2022-12-29 DIAGNOSIS — L65.9 BALDNESS: Primary | ICD-10-CM

## 2022-12-29 RX ORDER — SPIRONOLACTONE 50 MG/1
50 TABLET, FILM COATED ORAL DAILY
Qty: 30 TABLET | Refills: 1 | Status: SHIPPED | OUTPATIENT
Start: 2022-12-29 | End: 2023-02-01 | Stop reason: DRUGHIGH

## 2022-12-30 ENCOUNTER — PATIENT MESSAGE (OUTPATIENT)
Dept: OBSTETRICS AND GYNECOLOGY | Facility: CLINIC | Age: 57
End: 2022-12-30
Payer: COMMERCIAL

## 2022-12-30 LAB
CLINICAL INFO: NORMAL
CYTO CVX: NORMAL
CYTOLOGIST CVX/VAG CYTO: NORMAL
CYTOLOGIST CVX/VAG CYTO: NORMAL
CYTOLOGY CMNT CVX/VAG CYTO-IMP: NORMAL
CYTOLOGY PAP THIN PREP EXPLANATION: NORMAL
DATE OF PREVIOUS PAP: NORMAL
DATE PREVIOUS BX: YES
GEN CATEG CVX/VAG CYTO-IMP: NORMAL
LMP START DATE: NORMAL
MICROORGANISM CVX/VAG CYTO: NORMAL
PATHOLOGIST CVX/VAG CYTO: NORMAL
SERVICE CMNT-IMP: NORMAL
SPECIMEN SOURCE CVX/VAG CYTO: NORMAL
STAT OF ADQ CVX/VAG CYTO-IMP: NORMAL

## 2023-01-04 ENCOUNTER — PATIENT MESSAGE (OUTPATIENT)
Dept: OBSTETRICS AND GYNECOLOGY | Facility: CLINIC | Age: 58
End: 2023-01-04
Payer: COMMERCIAL

## 2023-01-12 RX ORDER — SODIUM, POTASSIUM,MAG SULFATES 17.5-3.13G
1 SOLUTION, RECONSTITUTED, ORAL ORAL DAILY
Qty: 1 KIT | Refills: 0 | Status: SHIPPED | OUTPATIENT
Start: 2023-01-12 | End: 2023-01-14

## 2023-01-16 NOTE — TELEPHONE ENCOUNTER
No new care gaps identified.  [facility] Embedded Care Gaps. Reference number: [id number]. 1/16/2023   10:44:31 AM CST

## 2023-01-17 RX ORDER — BUPROPION HYDROCHLORIDE 150 MG/1
150 TABLET ORAL DAILY
Qty: 90 TABLET | Refills: 2 | Status: SHIPPED | OUTPATIENT
Start: 2023-01-17

## 2023-01-17 NOTE — TELEPHONE ENCOUNTER
Refill Decision Note   Anita Jad  is requesting a refill authorization.  Brief Assessment and Rationale for Refill:  Approve     Medication Therapy Plan:       Medication Reconciliation Completed: No   Comments:     No Care Gaps recommended.     Note composed:12:47 PM 01/17/2023

## 2023-02-01 ENCOUNTER — OFFICE VISIT (OUTPATIENT)
Dept: PRIMARY CARE CLINIC | Facility: CLINIC | Age: 58
End: 2023-02-01
Payer: COMMERCIAL

## 2023-02-01 DIAGNOSIS — L65.9 BALDNESS: Primary | ICD-10-CM

## 2023-02-01 PROCEDURE — 99213 PR OFFICE/OUTPT VISIT, EST, LEVL III, 20-29 MIN: ICD-10-PCS | Mod: 95,,, | Performed by: NURSE PRACTITIONER

## 2023-02-01 PROCEDURE — 99213 OFFICE O/P EST LOW 20 MIN: CPT | Mod: 95,,, | Performed by: NURSE PRACTITIONER

## 2023-02-01 RX ORDER — SPIRONOLACTONE 100 MG/1
100 TABLET, FILM COATED ORAL DAILY
Qty: 90 TABLET | Refills: 3 | Status: SHIPPED | OUTPATIENT
Start: 2023-02-01 | End: 2023-10-24 | Stop reason: SDUPTHER

## 2023-02-01 NOTE — PROGRESS NOTES
Primary Care Telemedicine Note  The patient location is:  Patient Home   The chief complaint leading to consultation is: medication refill   Total time spent with patient: 20 minutes     Visit type: Virtual visit with synchronous audio only and video  Each patient to whom he or she provides medical services by telemedicine is:  (1) informed of the relationship between the physician and patient and the respective role of any other health care provider with respect to management of the patient; and (2) notified that he or she may decline to receive medical services by telemedicine and may withdraw from such care at any time.      Assessment/Plan:    Problem List Items Addressed This Visit    None  Visit Diagnoses       Baldness    -  Primary    Relevant Medications    spironolactone (ALDACTONE) 100 MG tablet    Other Relevant Orders    COMPREHENSIVE METABOLIC PANEL            Follow up if symptoms worsen or fail to improve.    Tammi Mcclain NP     _____________________________________________________________________________________________________________________________________________________    CC: female baldness     HPI:    Patient is an established patient who presents today via virtual visit. Baldness: This is a chronic problem. Started about 1 year ago and gradually worsening. She started active treatment 1 month ago.     The problem is unchanged. There has been no fever. He is experiencing no pain. Associated symptoms include congestion and sinus pressure. Pertinent negatives include no chills, coughing, diaphoresis, ear pain, headaches, hoarse voice, neck pain, shortness of breath, sneezing, sore throat or swollen glands. (Ear fullness) Past treatments include nothing. The treatment provided no relief.      Past Medical History:  Past Medical History:   Diagnosis Date    Anxiety     Depression     GERD (gastroesophageal reflux disease)      Past Surgical History:   Procedure Laterality Date     CHOLECYSTECTOMY      COLONOSCOPY N/A 11/13/2017    Procedure: COLONOSCOPY;  Surgeon: Yina Macias MD;  Location: [facility];  Service: Endoscopy;  Laterality: N/A;    HYSTERECTOMY      severe cervical dysplasia, no cancer, several paps normal since    pelvic lap      Feritility     Review of patient's allergies indicates:  No Known Allergies  Social History     Tobacco Use    Smoking status: Never    Smokeless tobacco: Never   Substance Use Topics    Alcohol use: Yes     Comment: Rare Occ    Drug use: No     Family History   Problem Relation Age of Onset    Diabetes Father     Hypertension Father     Heart disease Father     Prostate cancer Father     Anxiety disorder Mother     Depression Mother     Diabetes Mother     Hyperlipidemia Brother     Hypertension Brother     Heart disease Brother     Cataracts Daughter     Strabismus Daughter     Colon cancer Neg Hx     Breast cancer Neg Hx     Ovarian cancer Neg Hx      Current Outpatient Medications on File Prior to Visit   Medication Sig Dispense Refill    ALPRAZolam (XANAX) 0.25 MG tablet Take 1 tablet (0.25 mg total) by mouth 2 (two) times daily as needed (flight anxiety). 20 tablet 0    buPROPion (WELLBUTRIN XL) 150 MG TB24 tablet Take 1 tablet (150 mg total) by mouth once daily. 90 tablet 2    clobetasoL (TEMOVATE) 0.05 % external solution APPLY SMALL AMOUNT OF SOLUTION TO SCALP HS 1 TO 2 WEEKS THEN PRN FOR FLARE UPS      estradioL (ESTRACE) 1 MG tablet Take 1 tablet (1 mg total) by mouth once daily. 90 tablet 3    FLUoxetine 20 MG capsule Take 1 capsule (20 mg total) by mouth once daily. 90 capsule 1    ketoconazole (NIZORAL) 2 % shampoo       omeprazole (PRILOSEC) 40 MG capsule Take 1 capsule (40 mg total) by mouth once daily. 90 capsule 1    scopolamine (TRANSDERM-SCOP) 1.3-1.5 mg (1 mg over 3 days) Place 1 patch onto the skin behind the ear every 3 days as needed 3 patch 0    semaglutide (OZEMPIC) 1 mg/dose (4 mg/3 mL) Inject 1 mg into the skin every 7 days. 1  pen 2    testosterone (ANDROGEL) 1 % (50 mg/5 gram) GlPk Apply 1 drop topically once daily. 15 g 3    tretinoin (RETIN-A) 0.025 % cream       [DISCONTINUED] hydroCHLOROthiazide (HYDRODIURIL) 25 MG tablet Take 1 tablet (25 mg total) by mouth once daily. 90 tablet 1    [DISCONTINUED] spironolactone (ALDACTONE) 50 MG tablet Take 1 tablet (50 mg total) by mouth once daily. 30 tablet 1     No current facility-administered medications on file prior to visit.       Review of Systems   HENT:  Negative for hearing loss.         + hair loss   Eyes:  Negative for discharge.   Respiratory:  Negative for wheezing.    Cardiovascular:  Negative for chest pain and palpitations.   Gastrointestinal:  Negative for blood in stool, constipation, diarrhea and vomiting.   Genitourinary:  Negative for dysuria and hematuria.   Musculoskeletal:  Negative for neck pain.   Neurological:  Negative for weakness and headaches.   Endo/Heme/Allergies:  Negative for polydipsia.       Physical Exam   @thptenteredbppulse@  @thptenteredglucose@    Physical Exam  Vitals and nursing note reviewed.   Constitutional:       Appearance: She is well-developed.   HENT:      Head: Normocephalic and atraumatic.      Comments: Anterior baldness   Pulmonary:      Effort: Pulmonary effort is normal.   Musculoskeletal:         General: Normal range of motion.      Cervical back: Normal range of motion and neck supple.   Neurological:      Mental Status: She is alert and oriented to person, place, and time.      Deep Tendon Reflexes: Reflexes are normal and symmetric.   Psychiatric:         Behavior: Behavior normal.         Thought Content: Thought content normal.         Judgment: Judgment normal.       The patient's Health Maintenance was reviewed and the following appears to be due at this time:  Health Maintenance Due   Topic Date Due    COVID-19 Vaccine (3 - Booster for Pfizer series) 03/02/2021    Colorectal Cancer Screening  11/13/2022

## 2023-03-01 ENCOUNTER — DOCUMENTATION ONLY (OUTPATIENT)
Dept: REHABILITATION | Facility: HOSPITAL | Age: 58
End: 2023-03-01
Payer: COMMERCIAL

## 2023-03-01 DIAGNOSIS — R42 DIZZINESS: ICD-10-CM

## 2023-03-01 DIAGNOSIS — H81.91 PERIPHERAL VESTIBULOPATHY OF RIGHT EAR: Primary | ICD-10-CM

## 2023-03-01 DIAGNOSIS — R26.9 ABNORMALITY OF GAIT AND MOBILITY: ICD-10-CM

## 2023-03-01 NOTE — PROGRESS NOTES
[facility]  Physical Therapy Discharge Note    Name: Anita Street  [facility] Number: [id number]    Therapy Diagnosis:   Encounter Diagnoses   Name Primary?    Peripheral vestibulopathy of right ear Yes    Dizziness     Abnormality of gait and mobility      Physician: Deni Edouard MD    Physician Orders: PT Eval and Treat  Medical Diagnosis from Referral: peripheral vestibulopathy of right ear  Evaluation Date: 5/5/2022      Date of Last visit: 6/21/2022  Total Visits Received: 6    ASSESSMENT      Patient did not return for final assessment. [facility] was transferred to [facility] for additional assessment and treatment.     Discharge reason: Other:  Patient was transferred to different physical therapy location.     Discharge FOTO Score: N/A    GOALS:     Short Term Goals:  6 weeks Progress   Dizziness: Patient will report decreased dizziness, and recent s/s trend is improving in order to progress towards LTG's. PM   Function: Patient will demonstrate improved function as indicated by a functional status score of less than or equal to 41 out of 100 on FOTO. PC   Gait: Patient will demonstrate improved gait mechanics in order to improve functional mobility, improve quality of life, and decrease risk of further injury or fall.  PC   HEP: Patient will demonstrate independence with HEP in order to progress toward functional independence. Met   Improve postural awareness.  Met         Long Term Goals:  12 weeks Progress   Dizziness:  Patient will return to normal ADL, recreational, and work related activities with less dizziness and limitation.  PC   Function: Patient will demonstrate improved function as indicated by a functional status score of less than or equal to 38 out of 100 on FOTO. PC   Gait: Patient will demonstrate normalized gait mechanics with minimal compensation in order to return to PLOF. PC   Decrease saccades as well as positional and VOR related s/s in order for  patient to be able to switch positions or look quickly to different directions without onset of dizziness.  PM       Goals Key:  PC= progressing/continue;        PM= partially met;             DC= discontinue    PLAN   This patient is discharged from Physical Therapy      Lorna Cabezas PT

## 2023-04-27 DIAGNOSIS — H60.90 OTITIS EXTERNA, UNSPECIFIED CHRONICITY, UNSPECIFIED LATERALITY, UNSPECIFIED TYPE: Primary | ICD-10-CM

## 2023-04-27 RX ORDER — CIPROFLOXACIN 0.5 MG/.25ML
4 SOLUTION/ DROPS AURICULAR (OTIC) 2 TIMES DAILY
Qty: 20 EACH | Refills: 1 | Status: SHIPPED | OUTPATIENT
Start: 2023-04-27 | End: 2023-05-07

## 2023-07-06 RX ORDER — SEMAGLUTIDE 0.68 MG/ML
0.25 INJECTION, SOLUTION SUBCUTANEOUS
Qty: 3 ML | Refills: 1 | Status: SHIPPED | OUTPATIENT
Start: 2023-07-06

## 2023-07-11 ENCOUNTER — OFFICE VISIT (OUTPATIENT)
Dept: OPHTHALMOLOGY | Facility: CLINIC | Age: 58
End: 2023-07-11
Payer: COMMERCIAL

## 2023-07-11 DIAGNOSIS — H52.7 REFRACTIVE ERRORS: ICD-10-CM

## 2023-07-11 DIAGNOSIS — Z01.00 ENCOUNTER FOR EYE EXAM: Primary | ICD-10-CM

## 2023-07-11 PROCEDURE — 99999 PR PBB SHADOW E&M-EST. PATIENT-LVL III: CPT | Mod: PBBFAC,,, | Performed by: OPTOMETRIST

## 2023-07-11 PROCEDURE — 99999 PR PBB SHADOW E&M-EST. PATIENT-LVL III: ICD-10-PCS | Mod: PBBFAC,,, | Performed by: OPTOMETRIST

## 2023-07-11 PROCEDURE — 92310 PR CONTACT LENS FITTING (NO CHANGE): ICD-10-PCS | Mod: S$GLB,,, | Performed by: OPTOMETRIST

## 2023-07-11 PROCEDURE — 92310 CONTACT LENS FITTING OU: CPT | Mod: S$GLB,,, | Performed by: OPTOMETRIST

## 2023-07-11 PROCEDURE — 92015 DETERMINE REFRACTIVE STATE: CPT | Mod: S$GLB,,, | Performed by: OPTOMETRIST

## 2023-07-11 PROCEDURE — 92012 PR EYE EXAM, EST PATIENT,INTERMED: ICD-10-PCS | Mod: S$GLB,,, | Performed by: OPTOMETRIST

## 2023-07-11 PROCEDURE — 92012 INTRM OPH EXAM EST PATIENT: CPT | Mod: S$GLB,,, | Performed by: OPTOMETRIST

## 2023-07-11 PROCEDURE — 92015 PR REFRACTION: ICD-10-PCS | Mod: S$GLB,,, | Performed by: OPTOMETRIST

## 2023-07-11 NOTE — PROGRESS NOTES
HPI    Last visit with DKT on 06/09/2022.    Patient states no change with vision.  Trouble with dry eyes.  Rarely use otc drops.  Update glasses and mono vision contact lens rx.  Last edited by Priscila Freedman on 7/11/2023  3:42 PM.            Assessment /Plan     For exam results, see Encounter Report.    Encounter for eye exam    Refractive errors      No pathology.    Discussed CL charges.  Dispense Final Rx for glasses  Note changes CL Rx  RTC 1 year  Discussed above and answered questions.

## 2023-07-12 ENCOUNTER — PATIENT MESSAGE (OUTPATIENT)
Dept: OPHTHALMOLOGY | Facility: CLINIC | Age: 58
End: 2023-07-12
Payer: COMMERCIAL

## 2023-07-24 ENCOUNTER — PATIENT MESSAGE (OUTPATIENT)
Dept: OPHTHALMOLOGY | Facility: CLINIC | Age: 58
End: 2023-07-24
Payer: COMMERCIAL

## 2023-08-21 ENCOUNTER — PATIENT MESSAGE (OUTPATIENT)
Dept: ADMINISTRATIVE | Facility: HOSPITAL | Age: 58
End: 2023-08-21
Payer: COMMERCIAL

## 2023-08-29 ENCOUNTER — PATIENT OUTREACH (OUTPATIENT)
Dept: ADMINISTRATIVE | Facility: HOSPITAL | Age: 58
End: 2023-08-29
Payer: COMMERCIAL

## 2023-08-29 DIAGNOSIS — Z12.12 ENCOUNTER FOR SCREENING FOR COLORECTAL MALIGNANT NEOPLASM: Primary | ICD-10-CM

## 2023-08-29 DIAGNOSIS — Z12.31 ENCOUNTER FOR SCREENING MAMMOGRAM FOR BREAST CANCER: ICD-10-CM

## 2023-08-29 DIAGNOSIS — Z12.11 ENCOUNTER FOR SCREENING FOR COLORECTAL MALIGNANT NEOPLASM: Primary | ICD-10-CM

## 2023-08-29 NOTE — PROGRESS NOTES
Reached out to patient in regards to getting her scheduled for colonoscopy and mammogram. Orders placed.

## 2023-08-30 ENCOUNTER — HOSPITAL ENCOUNTER (OUTPATIENT)
Dept: PREADMISSION TESTING | Facility: HOSPITAL | Age: 58
Discharge: HOME | End: 2023-08-30
Attending: INTERNAL MEDICINE
Payer: COMMERCIAL

## 2023-08-30 DIAGNOSIS — Z12.12 ENCOUNTER FOR SCREENING FOR COLORECTAL MALIGNANT NEOPLASM: Primary | ICD-10-CM

## 2023-08-30 DIAGNOSIS — Z12.11 ENCOUNTER FOR SCREENING FOR COLORECTAL MALIGNANT NEOPLASM: Primary | ICD-10-CM

## 2023-08-30 RX ORDER — SODIUM, POTASSIUM,MAG SULFATES 17.5-3.13G
1 SOLUTION, RECONSTITUTED, ORAL ORAL DAILY
Qty: 1 KIT | Refills: 0 | Status: SHIPPED | OUTPATIENT
Start: 2023-08-30 | End: 2023-09-01

## 2023-10-24 DIAGNOSIS — L65.9 BALDNESS: ICD-10-CM

## 2023-10-24 DIAGNOSIS — L65.8 FEMALE PATTERN HAIR LOSS: Primary | ICD-10-CM

## 2023-10-24 RX ORDER — SPIRONOLACTONE 100 MG/1
100 TABLET, FILM COATED ORAL DAILY
Qty: 90 TABLET | Refills: 3 | Status: SHIPPED | OUTPATIENT
Start: 2023-10-24 | End: 2024-10-23

## 2023-11-02 RX ORDER — TRAZODONE HYDROCHLORIDE 50 MG/1
50 TABLET ORAL NIGHTLY
COMMUNITY
Start: 2023-10-17

## 2023-11-09 ENCOUNTER — HOSPITAL ENCOUNTER (OUTPATIENT)
Dept: RADIOLOGY | Facility: HOSPITAL | Age: 58
Discharge: HOME | End: 2023-11-09
Attending: FAMILY MEDICINE
Payer: COMMERCIAL

## 2023-11-09 ENCOUNTER — OFFICE VISIT (OUTPATIENT)
Dept: INTERNAL MEDICINE | Facility: CLINIC | Age: 58
End: 2023-11-09
Payer: COMMERCIAL

## 2023-11-09 VITALS
HEART RATE: 77 BPM | HEIGHT: 69 IN | BODY MASS INDEX: 25.14 KG/M2 | DIASTOLIC BLOOD PRESSURE: 80 MMHG | WEIGHT: 169.75 LBS | TEMPERATURE: 98 F | SYSTOLIC BLOOD PRESSURE: 110 MMHG

## 2023-11-09 DIAGNOSIS — Z00.00 ANNUAL PHYSICAL EXAM: Primary | ICD-10-CM

## 2023-11-09 DIAGNOSIS — Z12.31 ENCOUNTER FOR SCREENING MAMMOGRAM FOR BREAST CANCER: ICD-10-CM

## 2023-11-09 PROCEDURE — 3074F SYST BP LT 130 MM HG: CPT | Mod: CPTII,S$GLB,, | Performed by: FAMILY MEDICINE

## 2023-11-09 PROCEDURE — 1160F PR REVIEW ALL MEDS BY PRESCRIBER/CLIN PHARMACIST DOCUMENTED: ICD-10-PCS | Mod: CPTII,S$GLB,, | Performed by: FAMILY MEDICINE

## 2023-11-09 PROCEDURE — 99396 PREV VISIT EST AGE 40-64: CPT | Mod: S$GLB,,, | Performed by: FAMILY MEDICINE

## 2023-11-09 PROCEDURE — 3008F PR BODY MASS INDEX (BMI) DOCUMENTED: ICD-10-PCS | Mod: CPTII,S$GLB,, | Performed by: FAMILY MEDICINE

## 2023-11-09 PROCEDURE — 3079F DIAST BP 80-89 MM HG: CPT | Mod: CPTII,S$GLB,, | Performed by: FAMILY MEDICINE

## 2023-11-09 PROCEDURE — 77063 BREAST TOMOSYNTHESIS BI: CPT | Mod: 26,,, | Performed by: RADIOLOGY

## 2023-11-09 PROCEDURE — 1160F RVW MEDS BY RX/DR IN RCRD: CPT | Mod: CPTII,S$GLB,, | Performed by: FAMILY MEDICINE

## 2023-11-09 PROCEDURE — 77063 MAMMO DIGITAL SCREENING BILAT WITH TOMO: ICD-10-PCS | Mod: 26,,, | Performed by: RADIOLOGY

## 2023-11-09 PROCEDURE — 99999 PR PBB SHADOW E&M-EST. PATIENT-LVL IV: CPT | Mod: PBBFAC,,, | Performed by: FAMILY MEDICINE

## 2023-11-09 PROCEDURE — 77067 MAMMO DIGITAL SCREENING BILAT WITH TOMO: ICD-10-PCS | Mod: 26,,, | Performed by: RADIOLOGY

## 2023-11-09 PROCEDURE — 3008F BODY MASS INDEX DOCD: CPT | Mod: CPTII,S$GLB,, | Performed by: FAMILY MEDICINE

## 2023-11-09 PROCEDURE — 1159F MED LIST DOCD IN RCRD: CPT | Mod: CPTII,S$GLB,, | Performed by: FAMILY MEDICINE

## 2023-11-09 PROCEDURE — 99396 PR PREVENTIVE VISIT,EST,40-64: ICD-10-PCS | Mod: S$GLB,,, | Performed by: FAMILY MEDICINE

## 2023-11-09 PROCEDURE — 77067 SCR MAMMO BI INCL CAD: CPT | Mod: 26,,, | Performed by: RADIOLOGY

## 2023-11-09 PROCEDURE — 3079F PR MOST RECENT DIASTOLIC BLOOD PRESSURE 80-89 MM HG: ICD-10-PCS | Mod: CPTII,S$GLB,, | Performed by: FAMILY MEDICINE

## 2023-11-09 PROCEDURE — 99999 PR PBB SHADOW E&M-EST. PATIENT-LVL IV: ICD-10-PCS | Mod: PBBFAC,,, | Performed by: FAMILY MEDICINE

## 2023-11-09 PROCEDURE — 3074F PR MOST RECENT SYSTOLIC BLOOD PRESSURE < 130 MM HG: ICD-10-PCS | Mod: CPTII,S$GLB,, | Performed by: FAMILY MEDICINE

## 2023-11-09 PROCEDURE — 77067 SCR MAMMO BI INCL CAD: CPT | Mod: TC,PO

## 2023-11-09 PROCEDURE — 1159F PR MEDICATION LIST DOCUMENTED IN MEDICAL RECORD: ICD-10-PCS | Mod: CPTII,S$GLB,, | Performed by: FAMILY MEDICINE

## 2023-11-09 RX ORDER — OMEPRAZOLE 40 MG/1
40 CAPSULE, DELAYED RELEASE ORAL EVERY MORNING
Qty: 90 CAPSULE | Refills: 1 | Status: SHIPPED | OUTPATIENT
Start: 2023-11-09 | End: 2023-12-14 | Stop reason: SDUPTHER

## 2023-11-09 NOTE — PROGRESS NOTES
"Subjective:      Patient ID: Anita Street is a 57 y.o. female.    Chief Complaint: Annual Exam    HPI  58 yo here for annual.  Anxiety was up, now on prozac 60mg and wellbutrin 300mg.  Xanax PRN  Hair was falling out a good bit in past few yrs//on aldactone.  Using ozempic 0.5mg//weight is down  Going for colonoscopy tomorrow    Past Medical History:   Diagnosis Date    Anxiety     Depression     GERD (gastroesophageal reflux disease)     Hypertension      Family History   Problem Relation Age of Onset    Diabetes Father     Hypertension Father     Heart disease Father     Prostate cancer Father     Anxiety disorder Mother     Depression Mother     Diabetes Mother     Hyperlipidemia Brother     Hypertension Brother     Heart disease Brother     Cataracts Daughter     Strabismus Daughter     Colon cancer Neg Hx     Breast cancer Neg Hx     Ovarian cancer Neg Hx      Past Surgical History:   Procedure Laterality Date    CHOLECYSTECTOMY      COLONOSCOPY N/A 11/13/2017    Procedure: COLONOSCOPY;  Surgeon: Yina Macias MD;  Location: [facility];  Service: Endoscopy;  Laterality: N/A;    HYSTERECTOMY      severe cervical dysplasia, no cancer, several paps normal since    pelvic lap      Feritility     Social History     Tobacco Use    Smoking status: Never    Smokeless tobacco: Never   Substance Use Topics    Alcohol use: Yes     Comment: Rare Occ    Drug use: No       /80 (BP Location: Left arm)   Pulse 77   Temp 97.5 °F (36.4 °C)   Ht 5' 9" (1.753 m)   Wt 77 kg (169 lb 12.1 oz)   LMP  (LMP Unknown)   BMI 25.07 kg/m²     Review of Systems   Constitutional:  Negative for activity change, appetite change, chills, diaphoresis, fatigue, fever and unexpected weight change.   HENT:  Negative for ear pain, hearing loss, postnasal drip, rhinorrhea and tinnitus.    Eyes:  Negative for visual disturbance.   Respiratory:  Negative for cough, shortness of breath and wheezing.    Cardiovascular:  Negative for chest " ----- Message from Gabe Perez MD sent at 7/30/2023  4:04 PM CDT -----  Wic rn:  call patients with normal results  ; the h syed is pending    Signed  Gabe Perez MD  cisgender male  Preferred pronouns: (He, his, himself) pain, palpitations and leg swelling.   Gastrointestinal:  Negative for abdominal distention.   Genitourinary:  Negative for dysuria, frequency, hematuria and urgency.   Musculoskeletal:  Negative for back pain and joint swelling.   Neurological:  Negative for weakness and headaches.   Hematological:  Negative for adenopathy.   Psychiatric/Behavioral:  Negative for confusion and decreased concentration.        Objective:     Physical Exam  Vitals and nursing note reviewed.   Constitutional:       General: She is not in acute distress.     Appearance: She is well-developed.   HENT:      Right Ear: External ear normal.      Left Ear: External ear normal.      Nose: Nose normal.   Eyes:      Conjunctiva/sclera: Conjunctivae normal.      Pupils: Pupils are equal, round, and reactive to light.   Neck:      Thyroid: No thyromegaly.   Cardiovascular:      Rate and Rhythm: Normal rate and regular rhythm.      Heart sounds: Normal heart sounds.   Pulmonary:      Effort: Pulmonary effort is normal. No respiratory distress.      Breath sounds: Normal breath sounds. No wheezing or rales.   Abdominal:      General: Bowel sounds are normal. There is no distension.      Palpations: Abdomen is soft.      Tenderness: There is no abdominal tenderness. There is no guarding.   Musculoskeletal:         General: Normal range of motion.      Cervical back: Normal range of motion and neck supple.   Skin:     General: Skin is warm and dry.      Findings: No rash.   Neurological:      Mental Status: She is alert and oriented to person, place, and time.      Cranial Nerves: No cranial nerve deficit.   Psychiatric:         Behavior: Behavior normal.         Thought Content: Thought content normal.         Judgment: Judgment normal.         Lab Results   Component Value Date    WBC 12.09 04/16/2022    HGB 13.8 04/16/2022    HCT 42.0 04/16/2022     04/16/2022    CHOL 152 10/20/2022    TRIG 81 10/20/2022    HDL 49 10/20/2022    ALT 18  04/16/2022    AST 17 04/16/2022     10/20/2022    K 3.2 (L) 10/20/2022     10/20/2022    CREATININE 0.9 10/20/2022    BUN 13 10/20/2022    CO2 25 10/20/2022    TSH 1.852 04/16/2022    HGBA1C 5.2 10/20/2022       Assessment:     1. Annual physical exam         Plan:     Annual physical exam  -     CBC Auto Differential; Future; Expected date: 11/09/2023  -     Comprehensive Metabolic Panel; Future; Expected date: 11/09/2023  -     Hemoglobin A1C; Future; Expected date: 11/09/2023  -     Lipid Panel; Future; Expected date: 11/09/2023  -     TSH; Future; Expected date: 11/09/2023  -     Vitamin D; Future; Expected date: 11/09/2023    Other orders  -     omeprazole (PRILOSEC) 40 MG capsule; Take 1 capsule (40 mg total) by mouth every morning.  Dispense: 90 capsule; Refill: 1      Update labs  Cont meds  Refill  Had flu shot already  Cont with healthy lifestyle  Fu annually and PRN

## 2023-11-10 ENCOUNTER — HOSPITAL ENCOUNTER (OUTPATIENT)
Facility: HOSPITAL | Age: 58
Discharge: HOME | End: 2023-11-10
Attending: INTERNAL MEDICINE | Admitting: INTERNAL MEDICINE
Payer: COMMERCIAL

## 2023-11-10 VITALS
SYSTOLIC BLOOD PRESSURE: 111 MMHG | DIASTOLIC BLOOD PRESSURE: 75 MMHG | HEIGHT: 69 IN | WEIGHT: 165.38 LBS | HEART RATE: 83 BPM | TEMPERATURE: 98 F | BODY MASS INDEX: 24.5 KG/M2 | RESPIRATION RATE: 15 BRPM | OXYGEN SATURATION: 97 %

## 2023-11-10 DIAGNOSIS — Z12.11 COLON CANCER SCREENING: Primary | ICD-10-CM

## 2023-11-10 PROCEDURE — 82962 GLUCOSE BLOOD TEST: CPT | Performed by: INTERNAL MEDICINE

## 2023-11-10 RX ORDER — SODIUM CHLORIDE, SODIUM LACTATE, POTASSIUM CHLORIDE, CALCIUM CHLORIDE 600; 310; 30; 20 MG/100ML; MG/100ML; MG/100ML; MG/100ML
INJECTION, SOLUTION INTRAVENOUS CONTINUOUS
Status: DISCONTINUED | OUTPATIENT
Start: 2023-11-10 | End: 2023-11-10 | Stop reason: HOSPADM

## 2023-11-13 RX ORDER — SODIUM, POTASSIUM,MAG SULFATES 17.5-3.13G
1 SOLUTION, RECONSTITUTED, ORAL ORAL DAILY
Qty: 1 KIT | Refills: 0 | Status: SHIPPED | OUTPATIENT
Start: 2023-11-13 | End: 2023-11-15

## 2023-11-14 ENCOUNTER — ANESTHESIA EVENT (OUTPATIENT)
Dept: ENDOSCOPY | Facility: HOSPITAL | Age: 58
End: 2023-11-14
Payer: COMMERCIAL

## 2023-11-14 NOTE — ANESTHESIA PREPROCEDURE EVALUATION
11/14/2023  Anita Street is a 57 y.o., female.  Past Surgical History:   Procedure Laterality Date    CHOLECYSTECTOMY      COLONOSCOPY N/A 11/13/2017    Procedure: COLONOSCOPY;  Surgeon: Yina Macias MD;  Location: [facility];  Service: Endoscopy;  Laterality: N/A;    HYSTERECTOMY      severe cervical dysplasia, no cancer, several paps normal since    pelvic lap      Feritility     Past Medical History:   Diagnosis Date    Anxiety     Depression     GERD (gastroesophageal reflux disease)     Hypertension      SEMAGLUTIDE    Pre-op Assessment    I have reviewed the Patient Summary Reports.    I have reviewed the NPO Status.   I have reviewed the Medications.     Review of Systems  Anesthesia Hx:  No problems with previous Anesthesia   History of prior surgery of interest to airway management or planning:  Previous anesthesia: General        Denies Family Hx of Anesthesia complications.    Denies Personal Hx of Anesthesia complications.                    Social:  Non-Smoker, Social Alcohol Use       Hematology/Oncology:  Hematology Normal   Oncology Normal                                   EENT/Dental:  EENT/Dental Normal           Cardiovascular:     Hypertension                                        Pulmonary:  Pulmonary Normal                       Renal/:  Renal/ Normal                 Hepatic/GI:  Bowel Prep.   GERD             Musculoskeletal:  Musculoskeletal Normal                Neurological:  Neurology Normal                                      Endocrine:  Endocrine Normal            Dermatological:  Skin Normal    Psych:  Psychiatric History anxiety                 Physical Exam  General: Well nourished, Cooperative, Alert and Oriented    Airway:  Mallampati: II   Mouth Opening: Normal  TM Distance: Normal  Tongue: Normal  Neck ROM: Normal ROM    Dental:  Intact    Chest/Lungs:  Clear to  auscultation, Normal Respiratory Rate    Heart:  Rate: Normal  Rhythm: Regular Rhythm        Anesthesia Plan  Type of Anesthesia, risks & benefits discussed:    Anesthesia Type: Gen Natural Airway  Intra-op Monitoring Plan: Standard ASA Monitors  Post Op Pain Control Plan: multimodal analgesia  Induction:  IV  Informed Consent: Informed consent signed with the Patient and all parties understand the risks and agree with anesthesia plan.  All questions answered. Patient consented to blood products? No  ASA Score: 2  Day of Surgery Review of History & Physical: H&P Update referred to the surgeon/provider.    Ready For Surgery From Anesthesia Perspective.     .

## 2023-11-17 ENCOUNTER — ANESTHESIA (OUTPATIENT)
Dept: ENDOSCOPY | Facility: HOSPITAL | Age: 58
End: 2023-11-17
Payer: COMMERCIAL

## 2023-11-17 ENCOUNTER — HOSPITAL ENCOUNTER (OUTPATIENT)
Facility: HOSPITAL | Age: 58
Discharge: HOME | End: 2023-11-17
Attending: INTERNAL MEDICINE | Admitting: INTERNAL MEDICINE
Payer: COMMERCIAL

## 2023-11-17 VITALS
DIASTOLIC BLOOD PRESSURE: 66 MMHG | WEIGHT: 169.75 LBS | TEMPERATURE: 97 F | HEART RATE: 68 BPM | OXYGEN SATURATION: 100 % | HEIGHT: 69 IN | RESPIRATION RATE: 15 BRPM | SYSTOLIC BLOOD PRESSURE: 105 MMHG | BODY MASS INDEX: 25.14 KG/M2

## 2023-11-17 DIAGNOSIS — Z12.11 COLON CANCER SCREENING: Primary | ICD-10-CM

## 2023-11-17 PROCEDURE — 45385 COLONOSCOPY W/LESION REMOVAL: CPT | Performed by: INTERNAL MEDICINE

## 2023-11-17 PROCEDURE — 88305 TISSUE EXAM BY PATHOLOGIST: CPT | Performed by: PATHOLOGY

## 2023-11-17 PROCEDURE — 63600175 PHARM REV CODE 636 W HCPCS: Performed by: NURSE ANESTHETIST, CERTIFIED REGISTERED

## 2023-11-17 PROCEDURE — 45385 PR COLONOSCOPY,REMV LESN,SNARE: ICD-10-PCS | Mod: 33,,, | Performed by: INTERNAL MEDICINE

## 2023-11-17 PROCEDURE — 25000003 PHARM REV CODE 250: Performed by: INTERNAL MEDICINE

## 2023-11-17 PROCEDURE — 63600175 PHARM REV CODE 636 W HCPCS: Performed by: INTERNAL MEDICINE

## 2023-11-17 PROCEDURE — 37000009 HC ANESTHESIA EA ADD 15 MINS: Performed by: INTERNAL MEDICINE

## 2023-11-17 PROCEDURE — 88305 TISSUE EXAM BY PATHOLOGIST: CPT | Mod: 26,,, | Performed by: PATHOLOGY

## 2023-11-17 PROCEDURE — 37000008 HC ANESTHESIA 1ST 15 MINUTES: Performed by: INTERNAL MEDICINE

## 2023-11-17 PROCEDURE — 88305 TISSUE EXAM BY PATHOLOGIST: ICD-10-PCS | Mod: 26,,, | Performed by: PATHOLOGY

## 2023-11-17 PROCEDURE — 45385 COLONOSCOPY W/LESION REMOVAL: CPT | Mod: 33,,, | Performed by: INTERNAL MEDICINE

## 2023-11-17 PROCEDURE — D9220A PRA ANESTHESIA: ICD-10-PCS | Mod: 33,,, | Performed by: NURSE ANESTHETIST, CERTIFIED REGISTERED

## 2023-11-17 PROCEDURE — 27201089 HC SNARE, DISP (ANY): Performed by: INTERNAL MEDICINE

## 2023-11-17 PROCEDURE — D9220A PRA ANESTHESIA: Mod: 33,,, | Performed by: NURSE ANESTHETIST, CERTIFIED REGISTERED

## 2023-11-17 PROCEDURE — 25000003 PHARM REV CODE 250: Performed by: NURSE ANESTHETIST, CERTIFIED REGISTERED

## 2023-11-17 RX ORDER — DEXTROMETHORPHAN/PSEUDOEPHED 2.5-7.5/.8
DROPS ORAL
Status: DISCONTINUED | OUTPATIENT
Start: 2023-11-17 | End: 2023-11-17 | Stop reason: HOSPADM

## 2023-11-17 RX ORDER — PROPOFOL 10 MG/ML
VIAL (ML) INTRAVENOUS
Status: DISCONTINUED | OUTPATIENT
Start: 2023-11-17 | End: 2023-11-17

## 2023-11-17 RX ORDER — SODIUM CHLORIDE, SODIUM LACTATE, POTASSIUM CHLORIDE, CALCIUM CHLORIDE 600; 310; 30; 20 MG/100ML; MG/100ML; MG/100ML; MG/100ML
INJECTION, SOLUTION INTRAVENOUS CONTINUOUS
Status: DISCONTINUED | OUTPATIENT
Start: 2023-11-17 | End: 2023-11-17 | Stop reason: HOSPADM

## 2023-11-17 RX ORDER — LIDOCAINE HYDROCHLORIDE 20 MG/ML
INJECTION, SOLUTION EPIDURAL; INFILTRATION; INTRACAUDAL; PERINEURAL
Status: DISCONTINUED | OUTPATIENT
Start: 2023-11-17 | End: 2023-11-17

## 2023-11-17 RX ADMIN — PROPOFOL 100 MG: 10 INJECTION, EMULSION INTRAVENOUS at 08:11

## 2023-11-17 RX ADMIN — LIDOCAINE HYDROCHLORIDE 40 MG: 20 INJECTION, SOLUTION EPIDURAL; INFILTRATION; INTRACAUDAL; PERINEURAL at 08:11

## 2023-11-17 RX ADMIN — SODIUM CHLORIDE, SODIUM LACTATE, POTASSIUM CHLORIDE, AND CALCIUM CHLORIDE: 600; 310; 30; 20 INJECTION, SOLUTION INTRAVENOUS at 08:11

## 2023-11-17 RX ADMIN — PROPOFOL 30 MG: 10 INJECTION, EMULSION INTRAVENOUS at 08:11

## 2023-11-17 RX ADMIN — PROPOFOL 40 MG: 10 INJECTION, EMULSION INTRAVENOUS at 08:11

## 2023-11-17 NOTE — TRANSFER OF CARE
"Anesthesia Transfer of Care Note    Patient: Anita Street    Procedure(s) Performed: Procedure(s) (LRB):  COLONOSCOPY (N/A)    Patient location: PACU    Anesthesia Type: general    Transport from OR: Transported from OR on room air with adequate spontaneous ventilation    Post pain: adequate analgesia    Post assessment: no apparent anesthetic complications and tolerated procedure well    Post vital signs: stable    Level of consciousness: awake    Nausea/Vomiting: no nausea/vomiting    Complications: none    Transfer of care protocol was followed      Last vitals: Visit Vitals  BP (!) 141/63 (BP Location: Right arm, Patient Position: Sitting)   Pulse 75   Temp 36.2 °C (97.1 °F)   Resp 18   Ht 5' 9" (1.753 m)   Wt 77 kg (169 lb 12.1 oz)   LMP  (LMP Unknown)   SpO2 97%   Breastfeeding No   BMI 25.07 kg/m²     "

## 2023-11-17 NOTE — ANESTHESIA POSTPROCEDURE EVALUATION
Anesthesia Post Evaluation    Patient: Anita Street    Procedure(s) Performed: Procedure(s) (LRB):  COLONOSCOPY (N/A)    Final Anesthesia Type: general      Patient location during evaluation: PACU  Patient participation: Yes- Able to Participate  Level of consciousness: awake and alert and oriented  Post-procedure vital signs: reviewed and stable  Pain management: adequate  Airway patency: patent    PONV status at discharge: No PONV  Anesthetic complications: no      Cardiovascular status: blood pressure returned to baseline, stable and hemodynamically stable  Respiratory status: unassisted  Hydration status: euvolemic  Follow-up not needed.          Vitals Value Taken Time   /66 11/17/23 0910   Temp 36.1 °C (97 °F) 11/17/23 0850   Pulse 68 11/17/23 0910   Resp 15 11/17/23 0910   SpO2 100 % 11/17/23 0910         Event Time   Out of Recovery 09:10:44         Pain/Aditya Score: Aditya Score: 10 (11/17/2023  9:10 AM)

## 2023-11-17 NOTE — H&P
Endoscopy History and Physical    PCP - Dayton Sierra MD  Referring Physician - Bienvenido Rg MD  [address] [place] [address]      ASA - per anesthesia  Mallampati - per anesthesia  History of Anesthesia problems - no  Family history Anesthesia problems -  no   Plan of anesthesia - General    HPI  58 y.o. female    Planned Procedure: Colonoscopy  Diagnosis: screening for colon cancer  Chief Complaint: Same as above    Personnel H/o colon polyps:no  FH of colon cancer:no  Anticoagulation:no      ROS:  Constitutional: No fevers, chills, No weight loss  CV: No chest pain  Pulm: No cough, No shortness of breath  GI: see HPI    Medical History:  has a past medical history of Anxiety, Depression, GERD (gastroesophageal reflux disease), and Hypertension.    Surgical History:  has a past surgical history that includes Cholecystectomy; pelvic lap; Colonoscopy (N/A, 11/13/2017); and Hysterectomy.    Family History: family history includes Anxiety disorder in her mother; Cataracts in her daughter; Depression in her mother; Diabetes in her father and mother; Heart disease in her brother and father; Hyperlipidemia in her brother; Hypertension in her brother and father; Prostate cancer in her father; Strabismus in her daughter..    Social History:  reports that she has never smoked. She has never used smokeless tobacco. She reports current alcohol use. She reports that she does not use drugs.    Review of patient's allergies indicates:  No Known Allergies    Medications:   Medications Prior to Admission   Medication Sig Dispense Refill Last Dose    ALPRAZolam (XANAX) 0.25 MG tablet TAKE 1 TABLET BY MOUTH DAILY AS NEEDED FOR SLEEP   Past Month    buPROPion (WELLBUTRIN XL) 150 MG TB24 tablet Take 1 tablet (150 mg total) by mouth once daily. (Patient taking differently: Take 300 mg by mouth once daily.) 90 tablet 2 Past Week    clobetasoL (TEMOVATE) 0.05 % external solution APPLY SMALL AMOUNT OF  SOLUTION TO SCALP HS 1 TO 2 WEEKS THEN PRN FOR FLARE UPS   Past Week    ketoconazole (NIZORAL) 2 % shampoo    Past Week    omeprazole (PRILOSEC) 40 MG capsule Take 1 capsule (40 mg total) by mouth every morning. 90 capsule 1 Past Week    scopolamine (TRANSDERM-SCOP) 1.3-1.5 mg (1 mg over 3 days) Place 1 patch onto the skin behind the ear every 3 days as needed 3 patch 0 Past Month    semaglutide (OZEMPIC) 0.25 mg or 0.5 mg (2 mg/3 mL) pen injector Inject 0.25 mg into the skin every 7 days. 3 mL 1 10/29/2023    spironolactone (ALDACTONE) 100 MG tablet Take 1 tablet (100 mg total) by mouth once daily. 90 tablet 3 Past Week    testosterone (ANDROGEL) 1 % (50 mg/5 gram) GlPk Apply 1 drop topically once daily. 15 g 3 Past Week    traZODone (DESYREL) 50 MG tablet Take 50 mg by mouth every evening.   Past Week    tretinoin (RETIN-A) 0.025 % cream    Past Week    estradioL (ESTRACE) 1 MG tablet Take 1 tablet (1 mg total) by mouth once daily. 90 tablet 3     FLUoxetine 20 MG capsule Take 1 capsule (20 mg total) by mouth once daily. (Patient taking differently: Take 60 mg by mouth once daily.) 90 capsule 1        Physical Exam:    Vital Signs:   Vitals:    11/17/23 0722   BP: (!) 141/63   Pulse: 75   Resp: 18   Temp: 97.1 °F (36.2 °C)       General Appearance: Well appearing in no acute distress  Abdomen: Soft, non tender, non distended with normal bowel sounds, no masses    Labs:  Lab Results   Component Value Date    WBC 5.90 11/09/2023    HGB 13.5 11/09/2023    HCT 42.5 11/09/2023     11/09/2023    CHOL 179 11/09/2023    TRIG 64 11/09/2023    HDL 55 11/09/2023    ALT 11 11/09/2023    AST 15 11/09/2023     11/09/2023    K 4.6 11/09/2023     11/09/2023    CREATININE 1.1 11/09/2023    BUN 12 11/09/2023    CO2 27 11/09/2023    TSH 1.101 11/09/2023    HGBA1C 4.9 11/09/2023       I have explained the risks and benefits of this endoscopic procedure to the patient including but not limited to bleeding,  inflammation, infection, perforation, and death.    SEDATION PLAN: per anesthesia       History reviewed, vital signs satisfactory, cardiopulmonary status satisfactory, sedation options, risks and plans have been discussed with the patient  All their questions were answered and the patient agrees to the sedation procedures as planned and the patient is deemed an appropriate candidate for the sedation as planned.     The risks, benefits and alternatives of the procedure were discussed with the patient in detail. This discussion was had in the presence of endoscopy staff. The risks include, risks of adverse reaction to sedation requiring the use of reversal agents, bleeding requiring blood transfusion, perforation requiring surgical intervention and technical failure. Other risks include aspiration leading to respiratory distress and respiratory failure resulting in endotracheal intubation and mechanical ventilation including death. If anesthesia is being utilized for this procedure, it is up to the anesthesiologist to determine airway safety including elective endotracheal intubation. Questions were answered, they agree to proceed. There was no language barriers.       Procedure explained to patient, informed consent obtained and placed in chart.       Bienvenido Rg MD

## 2023-11-17 NOTE — PROVATION PATIENT INSTRUCTIONS
Discharge Summary/Instructions after an Endoscopic Procedure  Patient Name: Anita Street  Patient MRN: [id number]  Patient YOB: 1965 Friday, November 17, 2023  Bienvenido Rg MD  Dear patient,  As a result of recent federal legislation (The Federal Cures Act), you may   receive lab or pathology results from your procedure in your [facility]   account before your physician is able to contact you. Your physician or   their representative will relay the results to you with their   recommendations at their soonest availability.  Thank you,  RESTRICTIONS:  During your procedure today, you received medications for sedation.  These   medications may affect your judgment, balance and coordination.  Therefore,   for 24 hours, you have the following restrictions:   - DO NOT drive a car, operate machinery, make legal/financial decisions,   sign important papers or drink alcohol.    ACTIVITY:  Today: no heavy lifting, straining or running due to procedural   sedation/anesthesia.  The following day: return to full activity including work.  DIET:  Eat and drink normally unless instructed otherwise.     TREATMENT FOR COMMON SIDE EFFECTS:  - Mild abdominal pain, nausea, belching, bloating or excessive gas:  rest,   eat lightly and use a heating pad.  - Sore Throat: treat with throat lozenges and/or gargle with warm salt   water.  - Because air was used during the procedure, expelling large amounts of air   from your rectum or belching is normal.  - If a bowel prep was taken, you may not have a bowel movement for 1-3 days.    This is normal.  SYMPTOMS TO WATCH FOR AND REPORT TO YOUR PHYSICIAN:  1. Abdominal pain or bloating, other than gas cramps.  2. Chest pain.  3. Back pain.  4. Signs of infection such as: chills or fever occurring within 24 hours   after the procedure.  5. Rectal bleeding, which would show as bright red, maroon, or black stools.   (A tablespoon of blood from the rectum is not serious,  especially if   hemorrhoids are present.)  6. Vomiting.  7. Weakness or dizziness.  GO DIRECTLY TO THE NEAREST EMERGENCY ROOM IF YOU HAVE ANY OF THE FOLLOWING:      Difficulty breathing              Chills and/or fever over 101 F   Persistent vomiting and/or vomiting blood   Severe abdominal pain   Severe chest pain   Black, tarry stools   Bleeding- more than one tablespoon   Any other symptom or condition that you feel may need urgent attention  Your doctor recommends these additional instructions:  If any biopsies were taken, your doctors clinic will contact you in 1 to 2   weeks with any results.  - Discharge patient to home.   - Resume previous diet.   - Continue present medications.   - Await pathology results.   - Repeat colonoscopy in 5 years for surveillance.   - Return to referring physician as previously scheduled.   - Patient has a contact number available for emergencies.  The signs and   symptoms of potential delayed complications were discussed with the   patient.  Return to normal activities tomorrow.  Written discharge   instructions were provided to the patient.  For questions, problems or results please call your physician Bienvenido Rg MD at Work:  [phone]  If you have any questions about the above instructions, call the GI   department at [phone] or call the endoscopy unit at [phone]   from 7am until 3 pm.  [facility] [place], EMERGENCY ROOM PHONE NUMBER:   [phone]  IF A COMPLICATION OR EMERGENCY SITUATION ARISES AND YOU ARE UNABLE TO REACH   YOUR PHYSICIAN - GO DIRECTLY TO THE EMERGENCY ROOM.  I have read or have had read to me these discharge instructions for my   procedure and have received a written copy.  I understand these   instructions and will follow-up with my physician if I have any questions.     __________________________________       _____________________________________  Nurse Signature                                           Patient/Designated   Responsible Party Signature  MD Bienvenido Anderson MD  11/17/2023 8:49:26 AM  This report has been verified and signed electronically.  Dear patient,  As a result of recent federal legislation (The Federal Cures Act), you may   receive lab or pathology results from your procedure in your [facility]   account before your physician is able to contact you. Your physician or   their representative will relay the results to you with their   recommendations at their soonest availability.  Thank you,  PROVATION

## 2023-11-17 NOTE — DISCHARGE SUMMARY
[facility]  Discharge Note  Short Stay    Procedure(s) (LRB):  COLONOSCOPY (N/A)      OUTCOME: Patient tolerated treatment/procedure well without complication and is now ready for discharge.    DISPOSITION: Home or Self Care    FINAL DIAGNOSIS:  <principal problem not specified>    FOLLOWUP: With primary care provider    DISCHARGE INSTRUCTIONS:  No discharge procedures on file.     TIME SPENT ON DISCHARGE: 20 minutes

## 2023-11-22 LAB
FINAL PATHOLOGIC DIAGNOSIS: NORMAL
Lab: NORMAL

## 2023-12-14 DIAGNOSIS — K21.9 GASTROESOPHAGEAL REFLUX DISEASE, UNSPECIFIED WHETHER ESOPHAGITIS PRESENT: Primary | ICD-10-CM

## 2023-12-14 RX ORDER — OMEPRAZOLE 40 MG/1
40 CAPSULE, DELAYED RELEASE ORAL EVERY MORNING
Qty: 90 CAPSULE | Refills: 3 | Status: SHIPPED | OUTPATIENT
Start: 2023-12-14 | End: 2024-12-13

## 2024-01-22 DIAGNOSIS — N95.1 MENOPAUSE SYNDROME: ICD-10-CM

## 2024-01-23 RX ORDER — ESTRADIOL 1 MG/1
1 TABLET ORAL
Qty: 90 TABLET | Refills: 3 | Status: SHIPPED | OUTPATIENT
Start: 2024-01-23

## 2024-01-23 NOTE — TELEPHONE ENCOUNTER
Jaida Tillman MD   to [facility] Staff       1/23/24  8:26 AM  Refills sent.  Pt due for well-woman exam.  Please assist with scheduling.    Called patient and advised above.  Patient already has annual scheduled on 2/7.

## 2024-02-07 ENCOUNTER — TELEPHONE (OUTPATIENT)
Dept: OBSTETRICS AND GYNECOLOGY | Facility: CLINIC | Age: 59
End: 2024-02-07
Payer: COMMERCIAL

## 2024-02-07 NOTE — TELEPHONE ENCOUNTER
----- Message from Elin Capone MA sent at 2/7/2024  7:29 AM CST -----  Contact: PEBBLES WILCOX    Type:  Sooner Appointment Request    Caller is requesting a sooner appointment.  Caller declined first available appointment listed below.  Caller will not accept being placed on the waitlist and is requesting a message be sent to doctor.    Name of Caller: PEBBLES WILCOX  When is the first available appointment?  Patient went to [facility] and would like to reschedule to [facility] location   Symptoms:  annual  Best Call Back Number: [phone]   Additional Information:  Patient went to wrong location this morning please call to reschedule

## 2024-02-13 ENCOUNTER — TELEPHONE (OUTPATIENT)
Dept: PRIMARY CARE CLINIC | Facility: CLINIC | Age: 59
End: 2024-02-13
Payer: COMMERCIAL

## 2024-02-13 ENCOUNTER — CLINICAL SUPPORT (OUTPATIENT)
Dept: PRIMARY CARE CLINIC | Facility: CLINIC | Age: 59
End: 2024-02-13
Payer: COMMERCIAL

## 2024-02-13 DIAGNOSIS — R05.9 COUGH, UNSPECIFIED TYPE: Primary | ICD-10-CM

## 2024-02-13 DIAGNOSIS — R50.9 FEVER, UNSPECIFIED FEVER CAUSE: Primary | ICD-10-CM

## 2024-02-13 LAB
CTP QC/QA: YES
CTP QC/QA: YES
POC MOLECULAR INFLUENZA A AGN: NEGATIVE
POC MOLECULAR INFLUENZA B AGN: NEGATIVE
SARS-COV-2 AG RESP QL IA.RAPID: POSITIVE

## 2024-02-13 PROCEDURE — 87502 INFLUENZA DNA AMP PROBE: CPT | Mod: QW,S$GLB,, | Performed by: INTERNAL MEDICINE

## 2024-02-13 PROCEDURE — 87811 SARS-COV-2 COVID19 W/OPTIC: CPT | Mod: QW,S$GLB,, | Performed by: INTERNAL MEDICINE

## 2024-06-26 NOTE — ADDENDUM NOTE
Addended by: NIK REYEZ on: 10/6/2022 05:25 PM     Modules accepted: Orders     Left message for patient to call back. Patient needs a follow up appointment in 2 weeks with Violette GUERRA.

## 2024-08-08 ENCOUNTER — OFFICE VISIT (OUTPATIENT)
Dept: OTOLARYNGOLOGY | Facility: CLINIC | Age: 59
End: 2024-08-08
Payer: COMMERCIAL

## 2024-08-08 VITALS — BODY MASS INDEX: 26.66 KG/M2 | WEIGHT: 180.56 LBS

## 2024-08-08 DIAGNOSIS — K13.70 ORAL LESION: Primary | ICD-10-CM

## 2024-08-08 PROCEDURE — 99213 OFFICE O/P EST LOW 20 MIN: CPT | Mod: S$GLB,,, | Performed by: STUDENT IN AN ORGANIZED HEALTH CARE EDUCATION/TRAINING PROGRAM

## 2024-08-08 PROCEDURE — 99999 PR PBB SHADOW E&M-EST. PATIENT-LVL III: CPT | Mod: PBBFAC,,, | Performed by: STUDENT IN AN ORGANIZED HEALTH CARE EDUCATION/TRAINING PROGRAM

## 2024-08-08 PROCEDURE — 3008F BODY MASS INDEX DOCD: CPT | Mod: CPTII,S$GLB,, | Performed by: STUDENT IN AN ORGANIZED HEALTH CARE EDUCATION/TRAINING PROGRAM

## 2024-08-08 PROCEDURE — 1159F MED LIST DOCD IN RCRD: CPT | Mod: CPTII,S$GLB,, | Performed by: STUDENT IN AN ORGANIZED HEALTH CARE EDUCATION/TRAINING PROGRAM

## 2024-08-22 ENCOUNTER — OFFICE VISIT (OUTPATIENT)
Dept: OPHTHALMOLOGY | Facility: CLINIC | Age: 59
End: 2024-08-22
Payer: COMMERCIAL

## 2024-08-22 DIAGNOSIS — H52.7 REFRACTIVE ERRORS: ICD-10-CM

## 2024-08-22 DIAGNOSIS — H04.123 DRY EYE SYNDROME, BILATERAL: Primary | ICD-10-CM

## 2024-08-22 PROCEDURE — 99999 PR PBB SHADOW E&M-EST. PATIENT-LVL III: CPT | Mod: PBBFAC,,, | Performed by: OPTOMETRIST

## 2024-08-22 NOTE — PROGRESS NOTES
HPI     Annual Exam            Comments: Patient here today for yearly eye exam          Comments    Vision changes since last eye exam?: None noticed  Wears CTL MV full-time     Any eye pain today: No    Other ocular symptoms: None    Interested in contact lens fitting today? Yes                      Last edited by Nicci Galan, PCT on 8/22/2024  3:07 PM.            Assessment /Plan     For exam results, see Encounter Report.    1. Dry eye syndrome, bilateral  There were signs of mild dry eye on today's examination. Discussed warm compresses with lid hygiene twice daily. Recommend preservative-free artificial tears 3-4 times daily. Patient to return to clinic if no improvement in symptoms with current treatment.     2. Refractive errors  Contact Lens Final Rx       Final Contact Lens Rx         Brand Base Curve Diameter Sphere    Right Dailies Total 1 8.5 14.1 -0.75    Left Dailies Total 1 8.5 14.1 +1.25      Expiration Date: 8/22/2025    Replacement: Daily    Wearing Schedule: Daily Wear                  Contact lens trials fitted in office today. Contact lens hygiene reviewed. Patient able to insert the lenses themselves with minimal difficulty. Patient ok to finalize Contact lens after 1 week of wear. RTC if still having difficulty with CTL trial after 1 week.      RTC 1 yr for dilated eye exam or sooner if any changes to vision.   Discussed above and answered questions.

## 2024-11-20 DIAGNOSIS — E66.3 OVERWEIGHT (BMI 25.0-29.9): Primary | ICD-10-CM

## 2024-11-20 RX ORDER — TOPIRAMATE 25 MG/1
25 TABLET ORAL DAILY
Qty: 30 TABLET | Refills: 0 | Status: SHIPPED | OUTPATIENT
Start: 2024-11-20 | End: 2024-12-20

## 2024-11-20 NOTE — PROGRESS NOTES
I initiated the process of advance care planning today and explained the importance of this process to the patient.  I introduced the concept of advance directives to the patient, as well. Then the patient received detailed information about the importance of designating a Health Care Power of  (HCPOA).  The patient was also instructed to communicate with this person about their wishes for future healthcare, should the patient  become sick and lose decision-making capacity. The patient has not previously appointed a HCPOA. The patient is given a copy of ACP and Living will forms with the description on filling the forms.  After our discussion, the patient has agreed to complete a HCPOA and bring the copies of these forms to be placed in the personal medical chart.

## 2024-12-11 DIAGNOSIS — K12.2 MOUTH ABSCESS: Primary | ICD-10-CM

## 2024-12-11 RX ORDER — AMOXICILLIN 875 MG/1
875 TABLET, FILM COATED ORAL EVERY 12 HOURS
Qty: 14 TABLET | Refills: 0 | Status: SHIPPED | OUTPATIENT
Start: 2024-12-11 | End: 2024-12-18

## 2024-12-11 NOTE — PROGRESS NOTES
Abscess noted to lower center mouth where gums meet lip, tender to touch. No drainage noted.          NACHO Duque, FNP-C  [facility] [address] [place] [address] [phone]   [phone] fax

## 2024-12-15 DIAGNOSIS — E66.3 OVERWEIGHT (BMI 25.0-29.9): ICD-10-CM

## 2024-12-16 RX ORDER — TOPIRAMATE 25 MG/1
25 TABLET ORAL
Qty: 90 TABLET | Refills: 1 | Status: SHIPPED | OUTPATIENT
Start: 2024-12-16 | End: 2024-12-17

## 2024-12-17 DIAGNOSIS — E66.3 OVERWEIGHT (BMI 25.0-29.9): Primary | ICD-10-CM

## 2024-12-17 DIAGNOSIS — E66.3 OVERWEIGHT (BMI 25.0-29.9): ICD-10-CM

## 2024-12-17 RX ORDER — PHENTERMINE HYDROCHLORIDE 15 MG/1
15 CAPSULE ORAL EVERY MORNING
Qty: 30 CAPSULE | Refills: 1 | Status: SHIPPED | OUTPATIENT
Start: 2024-12-17 | End: 2025-02-15

## 2024-12-17 RX ORDER — TOPIRAMATE 50 MG/1
50 TABLET, FILM COATED ORAL DAILY
Qty: 90 TABLET | Refills: 0 | Status: SHIPPED | OUTPATIENT
Start: 2024-12-17 | End: 2025-03-17

## 2025-01-28 DIAGNOSIS — E66.3 OVERWEIGHT (BMI 25.0-29.9): ICD-10-CM

## 2025-01-28 RX ORDER — TOPIRAMATE 50 MG/1
50 TABLET, FILM COATED ORAL DAILY
Qty: 90 TABLET | Refills: 0 | Status: SHIPPED | OUTPATIENT
Start: 2025-01-28 | End: 2025-01-29

## 2025-01-28 RX ORDER — PHENTERMINE HYDROCHLORIDE 15 MG/1
15 CAPSULE ORAL EVERY MORNING
Qty: 30 CAPSULE | Refills: 2 | Status: SHIPPED | OUTPATIENT
Start: 2025-01-28 | End: 2025-01-29

## 2025-01-29 DIAGNOSIS — E66.3 OVERWEIGHT (BMI 25.0-29.9): ICD-10-CM

## 2025-01-29 RX ORDER — TOPIRAMATE 50 MG/1
50 TABLET, FILM COATED ORAL DAILY
Qty: 90 TABLET | Refills: 0 | Status: SHIPPED | OUTPATIENT
Start: 2025-01-29 | End: 2025-04-29

## 2025-01-29 RX ORDER — PHENTERMINE HYDROCHLORIDE 15 MG/1
15 CAPSULE ORAL EVERY MORNING
Qty: 90 CAPSULE | Refills: 0 | Status: SHIPPED | OUTPATIENT
Start: 2025-01-29 | End: 2025-04-29

## 2025-01-29 NOTE — PROGRESS NOTES
Prescription for Topamax and phentermine was accidentally sent to [facility], but I resent it to [facility] in [place]

## 2025-07-24 DIAGNOSIS — F43.9 STRESS: Primary | ICD-10-CM

## 2025-07-24 RX ORDER — BUSPIRONE HYDROCHLORIDE 7.5 MG/1
7.5 TABLET ORAL 2 TIMES DAILY
Qty: 60 TABLET | Refills: 3 | Status: SHIPPED | OUTPATIENT
Start: 2025-07-24

## 2025-09-05 ENCOUNTER — TELEPHONE (OUTPATIENT)
Dept: PRIMARY CARE CLINIC | Facility: CLINIC | Age: 60
End: 2025-09-05
Payer: COMMERCIAL

## 2025-09-05 RX ORDER — AZITHROMYCIN 250 MG/1
TABLET, FILM COATED ORAL
Qty: 6 TABLET | Refills: 0 | Status: SHIPPED | OUTPATIENT
Start: 2025-09-05 | End: 2025-09-10

## 2025-09-05 RX ORDER — PREDNISONE 20 MG/1
20 TABLET ORAL DAILY
Qty: 5 TABLET | Refills: 0 | Status: SHIPPED | OUTPATIENT
Start: 2025-09-05